# Patient Record
Sex: FEMALE | Race: WHITE | NOT HISPANIC OR LATINO | Employment: OTHER | URBAN - METROPOLITAN AREA
[De-identification: names, ages, dates, MRNs, and addresses within clinical notes are randomized per-mention and may not be internally consistent; named-entity substitution may affect disease eponyms.]

---

## 2018-02-02 ENCOUNTER — COMMUNICATION - HEALTHEAST (OUTPATIENT)
Dept: FAMILY MEDICINE | Facility: CLINIC | Age: 73
End: 2018-02-02

## 2018-02-09 ENCOUNTER — OFFICE VISIT - HEALTHEAST (OUTPATIENT)
Dept: FAMILY MEDICINE | Facility: CLINIC | Age: 73
End: 2018-02-09

## 2018-02-09 ENCOUNTER — AMBULATORY - HEALTHEAST (OUTPATIENT)
Dept: SCHEDULING | Facility: CLINIC | Age: 73
End: 2018-02-09

## 2018-02-09 DIAGNOSIS — Z13.220 ENCOUNTER FOR SCREENING FOR LIPOID DISORDERS: ICD-10-CM

## 2018-02-09 DIAGNOSIS — Z12.11 SCREEN FOR COLON CANCER: ICD-10-CM

## 2018-02-09 DIAGNOSIS — F33.40 RECURRENT MAJOR DEPRESSION IN REMISSION (H): ICD-10-CM

## 2018-02-09 DIAGNOSIS — E78.5 HYPERLIPIDEMIA, UNSPECIFIED HYPERLIPIDEMIA TYPE: ICD-10-CM

## 2018-02-09 DIAGNOSIS — Z00.00 HEALTH MAINTENANCE EXAMINATION: ICD-10-CM

## 2018-02-09 DIAGNOSIS — I10 ESSENTIAL HYPERTENSION: ICD-10-CM

## 2018-02-09 DIAGNOSIS — R73.01 IMPAIRED FASTING GLUCOSE: ICD-10-CM

## 2018-02-09 DIAGNOSIS — Z12.11 ENCOUNTER FOR SCREENING FOR MALIGNANT NEOPLASM OF COLON: ICD-10-CM

## 2018-02-09 DIAGNOSIS — G47.33 OBSTRUCTIVE SLEEP APNEA SYNDROME: ICD-10-CM

## 2018-02-09 LAB
25(OH)D3 SERPL-MCNC: 26.6 NG/ML (ref 30–80)
25(OH)D3 SERPL-MCNC: 26.6 NG/ML (ref 30–80)
ALBUMIN SERPL-MCNC: 4.1 G/DL (ref 3.5–5)
ALP SERPL-CCNC: 85 U/L (ref 45–120)
ALT SERPL W P-5'-P-CCNC: 55 U/L (ref 0–45)
ANION GAP SERPL CALCULATED.3IONS-SCNC: 13 MMOL/L (ref 5–18)
AST SERPL W P-5'-P-CCNC: 43 U/L (ref 0–40)
BILIRUB SERPL-MCNC: 0.7 MG/DL (ref 0–1)
BUN SERPL-MCNC: 14 MG/DL (ref 8–28)
CALCIUM SERPL-MCNC: 9.6 MG/DL (ref 8.5–10.5)
CHLORIDE BLD-SCNC: 101 MMOL/L (ref 98–107)
CHOLEST SERPL-MCNC: 190 MG/DL
CO2 SERPL-SCNC: 24 MMOL/L (ref 22–31)
CREAT SERPL-MCNC: 0.71 MG/DL (ref 0.6–1.1)
ERYTHROCYTE [DISTWIDTH] IN BLOOD BY AUTOMATED COUNT: 12.5 % (ref 11–14.5)
FASTING STATUS PATIENT QL REPORTED: YES
GFR SERPL CREATININE-BSD FRML MDRD: >60 ML/MIN/1.73M2
GLUCOSE BLD-MCNC: 109 MG/DL (ref 70–125)
HCT VFR BLD AUTO: 46 % (ref 35–47)
HDLC SERPL-MCNC: 58 MG/DL
HGB BLD-MCNC: 15.1 G/DL (ref 12–16)
LDLC SERPL CALC-MCNC: 98 MG/DL
MCH RBC QN AUTO: 29.4 PG (ref 27–34)
MCHC RBC AUTO-ENTMCNC: 32.7 G/DL (ref 32–36)
MCV RBC AUTO: 90 FL (ref 80–100)
PLATELET # BLD AUTO: 234 THOU/UL (ref 140–440)
PMV BLD AUTO: 7.2 FL (ref 7–10)
POTASSIUM BLD-SCNC: 4.3 MMOL/L (ref 3.5–5)
PROT SERPL-MCNC: 7.3 G/DL (ref 6–8)
RBC # BLD AUTO: 5.12 MILL/UL (ref 3.8–5.4)
SODIUM SERPL-SCNC: 138 MMOL/L (ref 136–145)
TRIGL SERPL-MCNC: 169 MG/DL
TSH SERPL DL<=0.005 MIU/L-ACNC: 1.88 UIU/ML (ref 0.3–5)
WBC: 5.2 THOU/UL (ref 4–11)

## 2018-02-09 RX ORDER — COVID-19 ANTIGEN TEST
220 KIT MISCELLANEOUS DAILY
Status: SHIPPED | COMMUNITY
Start: 2018-02-09 | End: 2021-12-09

## 2018-02-09 RX ORDER — ACETAMINOPHEN 325 MG/1
1300 TABLET ORAL DAILY
Status: SHIPPED | COMMUNITY
Start: 2018-02-09 | End: 2021-12-09

## 2018-02-09 ASSESSMENT — MIFFLIN-ST. JEOR: SCORE: 1417.43

## 2018-02-23 ENCOUNTER — COMMUNICATION - HEALTHEAST (OUTPATIENT)
Dept: FAMILY MEDICINE | Facility: CLINIC | Age: 73
End: 2018-02-23

## 2018-02-27 ENCOUNTER — HOSPITAL ENCOUNTER (OUTPATIENT)
Dept: MAMMOGRAPHY | Facility: HOSPITAL | Age: 73
Discharge: HOME | End: 2018-02-27
Attending: NURSE PRACTITIONER

## 2018-02-27 DIAGNOSIS — Z00.00 HEALTH MAINTENANCE EXAMINATION: ICD-10-CM

## 2018-04-23 ENCOUNTER — RECORDS - HEALTHEAST (OUTPATIENT)
Dept: ADMINISTRATIVE | Facility: OTHER | Age: 73
End: 2018-04-23

## 2018-10-19 ENCOUNTER — COMMUNICATION - HEALTHEAST (OUTPATIENT)
Dept: FAMILY MEDICINE | Facility: CLINIC | Age: 73
End: 2018-10-19

## 2018-11-21 ENCOUNTER — RECORDS - HEALTHEAST (OUTPATIENT)
Dept: ADMINISTRATIVE | Facility: OTHER | Age: 73
End: 2018-11-21

## 2018-11-27 ENCOUNTER — OFFICE VISIT - HEALTHEAST (OUTPATIENT)
Dept: FAMILY MEDICINE | Facility: CLINIC | Age: 73
End: 2018-11-27

## 2018-11-27 DIAGNOSIS — Z86.69 HISTORY OF EAR INFECTION: ICD-10-CM

## 2018-11-27 DIAGNOSIS — Z86.19 HISTORY OF SHINGLES: ICD-10-CM

## 2018-12-10 ENCOUNTER — OFFICE VISIT - HEALTHEAST (OUTPATIENT)
Dept: FAMILY MEDICINE | Facility: CLINIC | Age: 73
End: 2018-12-10

## 2018-12-10 DIAGNOSIS — R30.0 DYSURIA: ICD-10-CM

## 2018-12-10 LAB
ALBUMIN UR-MCNC: NEGATIVE MG/DL
AMORPH CRY #/AREA URNS HPF: ABNORMAL /[HPF]
APPEARANCE UR: CLEAR
BACTERIA #/AREA URNS HPF: ABNORMAL HPF
BILIRUB UR QL STRIP: NEGATIVE
COLOR UR AUTO: YELLOW
GLUCOSE UR STRIP-MCNC: NEGATIVE MG/DL
HGB UR QL STRIP: ABNORMAL
KETONES UR STRIP-MCNC: NEGATIVE MG/DL
LEUKOCYTE ESTERASE UR QL STRIP: ABNORMAL
NITRATE UR QL: NEGATIVE
PH UR STRIP: 7.5 [PH] (ref 5–8)
RBC #/AREA URNS AUTO: ABNORMAL HPF
SP GR UR STRIP: 1.02 (ref 1–1.03)
SQUAMOUS #/AREA URNS AUTO: ABNORMAL LPF
UROBILINOGEN UR STRIP-ACNC: ABNORMAL
WBC #/AREA URNS AUTO: ABNORMAL HPF

## 2018-12-13 LAB — BACTERIA SPEC CULT: ABNORMAL

## 2019-01-19 ENCOUNTER — COMMUNICATION - HEALTHEAST (OUTPATIENT)
Dept: FAMILY MEDICINE | Facility: CLINIC | Age: 74
End: 2019-01-19

## 2019-01-19 DIAGNOSIS — I10 ESSENTIAL HYPERTENSION: ICD-10-CM

## 2019-01-19 DIAGNOSIS — F33.40 RECURRENT MAJOR DEPRESSION IN REMISSION (H): ICD-10-CM

## 2019-03-29 ENCOUNTER — RECORDS - HEALTHEAST (OUTPATIENT)
Dept: GENERAL RADIOLOGY | Facility: CLINIC | Age: 74
End: 2019-03-29

## 2019-03-29 ENCOUNTER — OFFICE VISIT - HEALTHEAST (OUTPATIENT)
Dept: FAMILY MEDICINE | Facility: CLINIC | Age: 74
End: 2019-03-29

## 2019-03-29 ENCOUNTER — AMBULATORY - HEALTHEAST (OUTPATIENT)
Dept: SCHEDULING | Facility: CLINIC | Age: 74
End: 2019-03-29

## 2019-03-29 DIAGNOSIS — M25.561 PAIN IN RIGHT KNEE: ICD-10-CM

## 2019-03-29 DIAGNOSIS — Z00.01 ENCOUNTER FOR GENERAL ADULT MEDICAL EXAMINATION WITH ABNORMAL FINDINGS: ICD-10-CM

## 2019-03-29 DIAGNOSIS — Z78.0 POSTMENOPAUSAL: ICD-10-CM

## 2019-03-29 DIAGNOSIS — G47.33 OBSTRUCTIVE SLEEP APNEA SYNDROME: ICD-10-CM

## 2019-03-29 DIAGNOSIS — I10 ESSENTIAL HYPERTENSION: ICD-10-CM

## 2019-03-29 DIAGNOSIS — E78.5 HYPERLIPIDEMIA, UNSPECIFIED HYPERLIPIDEMIA TYPE: ICD-10-CM

## 2019-03-29 DIAGNOSIS — M79.672 PAIN IN BOTH FEET: ICD-10-CM

## 2019-03-29 DIAGNOSIS — F33.40 RECURRENT MAJOR DEPRESSION IN REMISSION (H): ICD-10-CM

## 2019-03-29 DIAGNOSIS — M79.671 PAIN IN BOTH FEET: ICD-10-CM

## 2019-03-29 DIAGNOSIS — Z78.0 POSTMENOPAUSAL STATUS: ICD-10-CM

## 2019-03-29 DIAGNOSIS — G89.29 CHRONIC PAIN OF RIGHT KNEE: ICD-10-CM

## 2019-03-29 DIAGNOSIS — G89.29 OTHER CHRONIC PAIN: ICD-10-CM

## 2019-03-29 DIAGNOSIS — M25.561 CHRONIC PAIN OF RIGHT KNEE: ICD-10-CM

## 2019-03-29 LAB
ALBUMIN SERPL-MCNC: 4.2 G/DL (ref 3.5–5)
ALP SERPL-CCNC: 78 U/L (ref 45–120)
ALT SERPL W P-5'-P-CCNC: 45 U/L (ref 0–45)
ANION GAP SERPL CALCULATED.3IONS-SCNC: 10 MMOL/L (ref 5–18)
AST SERPL W P-5'-P-CCNC: 35 U/L (ref 0–40)
BILIRUB SERPL-MCNC: 0.6 MG/DL (ref 0–1)
BUN SERPL-MCNC: 18 MG/DL (ref 8–28)
CALCIUM SERPL-MCNC: 9.9 MG/DL (ref 8.5–10.5)
CHLORIDE BLD-SCNC: 103 MMOL/L (ref 98–107)
CHOLEST SERPL-MCNC: 175 MG/DL
CO2 SERPL-SCNC: 25 MMOL/L (ref 22–31)
CREAT SERPL-MCNC: 0.73 MG/DL (ref 0.6–1.1)
ERYTHROCYTE [DISTWIDTH] IN BLOOD BY AUTOMATED COUNT: 12 % (ref 11–14.5)
FASTING STATUS PATIENT QL REPORTED: YES
GFR SERPL CREATININE-BSD FRML MDRD: >60 ML/MIN/1.73M2
GLUCOSE BLD-MCNC: 107 MG/DL (ref 70–125)
HCT VFR BLD AUTO: 43.1 % (ref 35–47)
HDLC SERPL-MCNC: 58 MG/DL
HGB BLD-MCNC: 14.3 G/DL (ref 12–16)
LDLC SERPL CALC-MCNC: 93 MG/DL
MCH RBC QN AUTO: 30.3 PG (ref 27–34)
MCHC RBC AUTO-ENTMCNC: 33.2 G/DL (ref 32–36)
MCV RBC AUTO: 91 FL (ref 80–100)
PLATELET # BLD AUTO: 245 THOU/UL (ref 140–440)
PMV BLD AUTO: 7.3 FL (ref 7–10)
POTASSIUM BLD-SCNC: 4.1 MMOL/L (ref 3.5–5)
PROT SERPL-MCNC: 6.9 G/DL (ref 6–8)
RBC # BLD AUTO: 4.72 MILL/UL (ref 3.8–5.4)
SODIUM SERPL-SCNC: 138 MMOL/L (ref 136–145)
TRIGL SERPL-MCNC: 121 MG/DL
VIT B12 SERPL-MCNC: 1616 PG/ML (ref 213–816)
WBC: 5.2 THOU/UL (ref 4–11)

## 2019-03-29 RX ORDER — CHOLECALCIFEROL (VITAMIN D3) 50 MCG
4000 TABLET ORAL DAILY
Status: SHIPPED | COMMUNITY
Start: 2019-03-29

## 2019-03-29 ASSESSMENT — MIFFLIN-ST. JEOR: SCORE: 1396.23

## 2019-04-01 LAB
25(OH)D3 SERPL-MCNC: 42.9 NG/ML (ref 30–80)
25(OH)D3 SERPL-MCNC: 42.9 NG/ML (ref 30–80)

## 2019-04-29 ENCOUNTER — OFFICE VISIT - HEALTHEAST (OUTPATIENT)
Dept: PODIATRY | Facility: CLINIC | Age: 74
End: 2019-04-29

## 2019-04-29 DIAGNOSIS — M20.12 VALGUS DEFORMITY OF BOTH GREAT TOES: ICD-10-CM

## 2019-04-29 DIAGNOSIS — M21.621 TAILOR'S BUNION OF BOTH FEET: ICD-10-CM

## 2019-04-29 DIAGNOSIS — M79.672 FOOT PAIN, BILATERAL: ICD-10-CM

## 2019-04-29 DIAGNOSIS — M20.11 VALGUS DEFORMITY OF BOTH GREAT TOES: ICD-10-CM

## 2019-04-29 DIAGNOSIS — M79.671 FOOT PAIN, BILATERAL: ICD-10-CM

## 2019-04-29 DIAGNOSIS — M21.622 TAILOR'S BUNION OF BOTH FEET: ICD-10-CM

## 2019-04-29 DIAGNOSIS — G62.9 PERIPHERAL POLYNEUROPATHY: ICD-10-CM

## 2019-04-29 ASSESSMENT — MIFFLIN-ST. JEOR: SCORE: 1392.6

## 2019-05-06 ENCOUNTER — COMMUNICATION - HEALTHEAST (OUTPATIENT)
Dept: OTHER | Facility: CLINIC | Age: 74
End: 2019-05-06

## 2019-05-13 ENCOUNTER — COMMUNICATION - HEALTHEAST (OUTPATIENT)
Dept: OTHER | Facility: CLINIC | Age: 74
End: 2019-05-13

## 2019-06-24 ENCOUNTER — RECORDS - HEALTHEAST (OUTPATIENT)
Dept: ADMINISTRATIVE | Facility: OTHER | Age: 74
End: 2019-06-24

## 2019-07-09 ENCOUNTER — OFFICE VISIT - HEALTHEAST (OUTPATIENT)
Dept: FAMILY MEDICINE | Facility: CLINIC | Age: 74
End: 2019-07-09

## 2019-07-09 ENCOUNTER — COMMUNICATION - HEALTHEAST (OUTPATIENT)
Dept: SCHEDULING | Facility: CLINIC | Age: 74
End: 2019-07-09

## 2019-07-09 DIAGNOSIS — R39.15 URINARY URGENCY: ICD-10-CM

## 2019-07-09 DIAGNOSIS — B37.31 YEAST INFECTION OF THE VAGINA: ICD-10-CM

## 2019-07-09 LAB
ALBUMIN UR-MCNC: NEGATIVE MG/DL
APPEARANCE UR: CLEAR
BILIRUB UR QL STRIP: NEGATIVE
COLOR UR AUTO: YELLOW
GLUCOSE UR STRIP-MCNC: NEGATIVE MG/DL
HGB UR QL STRIP: NEGATIVE
KETONES UR STRIP-MCNC: NEGATIVE MG/DL
LEUKOCYTE ESTERASE UR QL STRIP: ABNORMAL
NITRATE UR QL: NEGATIVE
PH UR STRIP: 7 [PH] (ref 5–8)
SP GR UR STRIP: 1.01 (ref 1–1.03)
SQUAMOUS #/AREA URNS AUTO: ABNORMAL LPF
UROBILINOGEN UR STRIP-ACNC: ABNORMAL
WBC #/AREA URNS AUTO: ABNORMAL HPF

## 2019-07-09 ASSESSMENT — MIFFLIN-ST. JEOR: SCORE: 1383.69

## 2019-07-10 LAB — BACTERIA SPEC CULT: NO GROWTH

## 2019-07-25 ENCOUNTER — COMMUNICATION - HEALTHEAST (OUTPATIENT)
Dept: FAMILY MEDICINE | Facility: CLINIC | Age: 74
End: 2019-07-25

## 2019-07-25 DIAGNOSIS — N89.8 VAGINAL DRYNESS: ICD-10-CM

## 2019-07-30 ENCOUNTER — COMMUNICATION - HEALTHEAST (OUTPATIENT)
Dept: FAMILY MEDICINE | Facility: CLINIC | Age: 74
End: 2019-07-30

## 2019-07-30 DIAGNOSIS — N89.8 VAGINAL DRYNESS: ICD-10-CM

## 2019-07-30 DIAGNOSIS — F33.40 RECURRENT MAJOR DEPRESSION IN REMISSION (H): ICD-10-CM

## 2019-10-16 ENCOUNTER — COMMUNICATION - HEALTHEAST (OUTPATIENT)
Dept: FAMILY MEDICINE | Facility: CLINIC | Age: 74
End: 2019-10-16

## 2019-10-16 DIAGNOSIS — E78.2 MIXED HYPERLIPIDEMIA: ICD-10-CM

## 2019-12-19 ENCOUNTER — OFFICE VISIT - HEALTHEAST (OUTPATIENT)
Dept: FAMILY MEDICINE | Facility: CLINIC | Age: 74
End: 2019-12-19

## 2019-12-19 DIAGNOSIS — G47.33 OBSTRUCTIVE SLEEP APNEA SYNDROME: ICD-10-CM

## 2019-12-19 DIAGNOSIS — E78.2 MIXED HYPERLIPIDEMIA: ICD-10-CM

## 2019-12-19 DIAGNOSIS — F33.40 RECURRENT MAJOR DEPRESSION IN REMISSION (H): ICD-10-CM

## 2019-12-19 DIAGNOSIS — I10 ESSENTIAL HYPERTENSION: ICD-10-CM

## 2019-12-19 ASSESSMENT — MIFFLIN-ST. JEOR: SCORE: 1379.15

## 2019-12-19 ASSESSMENT — PATIENT HEALTH QUESTIONNAIRE - PHQ9: SUM OF ALL RESPONSES TO PHQ QUESTIONS 1-9: 1

## 2020-01-06 ENCOUNTER — OFFICE VISIT - HEALTHEAST (OUTPATIENT)
Dept: FAMILY MEDICINE | Facility: CLINIC | Age: 75
End: 2020-01-06

## 2020-01-06 ENCOUNTER — RECORDS - HEALTHEAST (OUTPATIENT)
Dept: GENERAL RADIOLOGY | Facility: CLINIC | Age: 75
End: 2020-01-06

## 2020-01-06 DIAGNOSIS — J20.9 ACUTE BRONCHITIS, UNSPECIFIED ORGANISM: ICD-10-CM

## 2020-01-06 DIAGNOSIS — Z12.31 VISIT FOR SCREENING MAMMOGRAM: ICD-10-CM

## 2020-01-06 DIAGNOSIS — B37.31 YEAST VAGINITIS: ICD-10-CM

## 2020-01-06 DIAGNOSIS — R05.9 COUGH: ICD-10-CM

## 2020-01-14 ENCOUNTER — COMMUNICATION - HEALTHEAST (OUTPATIENT)
Dept: FAMILY MEDICINE | Facility: CLINIC | Age: 75
End: 2020-01-14

## 2020-01-14 DIAGNOSIS — F33.40 RECURRENT MAJOR DEPRESSION IN REMISSION (H): ICD-10-CM

## 2020-01-14 DIAGNOSIS — E78.2 MIXED HYPERLIPIDEMIA: ICD-10-CM

## 2020-01-14 DIAGNOSIS — I10 ESSENTIAL HYPERTENSION: ICD-10-CM

## 2020-03-24 ENCOUNTER — COMMUNICATION - HEALTHEAST (OUTPATIENT)
Dept: FAMILY MEDICINE | Facility: CLINIC | Age: 75
End: 2020-03-24

## 2020-03-24 DIAGNOSIS — I10 ESSENTIAL HYPERTENSION: ICD-10-CM

## 2020-03-26 ENCOUNTER — COMMUNICATION - HEALTHEAST (OUTPATIENT)
Dept: FAMILY MEDICINE | Facility: CLINIC | Age: 75
End: 2020-03-26

## 2020-04-02 ENCOUNTER — NURSE TRIAGE (OUTPATIENT)
Dept: NURSING | Facility: CLINIC | Age: 75
End: 2020-04-02

## 2020-04-02 NOTE — TELEPHONE ENCOUNTER
Patient called really to find out if she can take Ibuprofen again. In February she saw her ortho and was told she has bone on bone in right knee and had cortisone shot and was taking alternating  tylenol and ibuprofen.  She also had 6 PT sessions  before closing of clinic and continues those exercises. She has only been taking the tylenol due to the negative news about Ibuprofen. Now her knee is very sore.   Checked with lead RN and [facility] and WHO are not offering this advice. No contraindications against Ibuprofen at this time. Reviewed other advice for pain. Later found out she is taking Alleve rather than Ibuprofen, and she will restart this tonight. Also recommended she use mychart to contact her PCP for further validation. She states she has a [facility] PCP in [place] however I am unable to find this chart.     She did mention she also has dry cough for about a week, but then later says she had bronchitis in January and this has been there and is much better now. She also has nasal drip when she goes outside   and hinks this is all related to seasonal allergies. She has no temperature. Reviewed some OTC things like Mucinex also for this. Reminded to call back should cough get worse or should she develop a fever.     Rain Pollack RN on 4/2/2020 at 2:07 PM      Additional Information    Negative: Sounds like a life-threatening emergency to the triager    Negative: Swollen knee joint and fever    Negative: Thigh or calf pain and only 1 side and present > 1 hour    Negative: Thigh or calf swelling and only 1 side    Negative: Patient sounds very sick or weak to the triager    Negative: Can't move swollen joint at all    Negative: SEVERE pain (e.g., excruciating, unable to walk)    Negative: Very swollen joint    Negative: Painful rash with multiple small blisters grouped together (i.e., dermatomal distribution or 'band' or 'stripe')    Negative: Looks like a boil, infected sore, deep ulcer, or other infected rash (spreading  redness, pus)    Negative: Patient wants to be seen    Negative: MODERATE pain (e.g., symptoms interfere with work or school, limping) and present > 3 days    Negative: Swollen knee joint (no fever or redness)    Negative: Fluid-filled sack just below knee cap  (no fever or redness)    Negative: MILD knee pain persists > 7 days    Knee pain is a chronic symptom (recurrent or ongoing AND lasting > 4 weeks)    Protocols used: KNEE PAIN-A-OH

## 2020-07-09 ENCOUNTER — COMMUNICATION - HEALTHEAST (OUTPATIENT)
Dept: FAMILY MEDICINE | Facility: CLINIC | Age: 75
End: 2020-07-09

## 2020-07-09 DIAGNOSIS — I10 ESSENTIAL HYPERTENSION: ICD-10-CM

## 2020-07-10 ENCOUNTER — COMMUNICATION - HEALTHEAST (OUTPATIENT)
Dept: FAMILY MEDICINE | Facility: CLINIC | Age: 75
End: 2020-07-10

## 2020-07-10 DIAGNOSIS — I10 ESSENTIAL HYPERTENSION: ICD-10-CM

## 2020-07-21 ENCOUNTER — COMMUNICATION - HEALTHEAST (OUTPATIENT)
Dept: SCHEDULING | Facility: CLINIC | Age: 75
End: 2020-07-21

## 2020-07-21 ENCOUNTER — OFFICE VISIT - HEALTHEAST (OUTPATIENT)
Dept: FAMILY MEDICINE | Facility: CLINIC | Age: 75
End: 2020-07-21

## 2020-07-21 DIAGNOSIS — S60.041A CONTUSION OF RIGHT RING FINGER WITHOUT DAMAGE TO NAIL, INITIAL ENCOUNTER: ICD-10-CM

## 2020-08-03 ENCOUNTER — COMMUNICATION - HEALTHEAST (OUTPATIENT)
Dept: FAMILY MEDICINE | Facility: CLINIC | Age: 75
End: 2020-08-03

## 2020-08-03 DIAGNOSIS — E78.2 MIXED HYPERLIPIDEMIA: ICD-10-CM

## 2020-08-03 DIAGNOSIS — I10 ESSENTIAL HYPERTENSION: ICD-10-CM

## 2020-08-03 DIAGNOSIS — F33.40 RECURRENT MAJOR DEPRESSION IN REMISSION (H): ICD-10-CM

## 2020-08-18 ENCOUNTER — OFFICE VISIT - HEALTHEAST (OUTPATIENT)
Dept: FAMILY MEDICINE | Facility: CLINIC | Age: 75
End: 2020-08-18

## 2020-08-18 DIAGNOSIS — Z87.898 HISTORY OF CHRONIC COUGH: ICD-10-CM

## 2020-08-18 DIAGNOSIS — E78.2 MIXED HYPERLIPIDEMIA: ICD-10-CM

## 2020-08-18 DIAGNOSIS — R73.01 IMPAIRED FASTING GLUCOSE: ICD-10-CM

## 2020-08-18 DIAGNOSIS — G47.33 OBSTRUCTIVE SLEEP APNEA SYNDROME: ICD-10-CM

## 2020-08-18 DIAGNOSIS — I10 ESSENTIAL HYPERTENSION: ICD-10-CM

## 2020-08-18 DIAGNOSIS — F33.40 RECURRENT MAJOR DEPRESSION IN REMISSION (H): ICD-10-CM

## 2020-08-18 DIAGNOSIS — Z12.31 VISIT FOR SCREENING MAMMOGRAM: ICD-10-CM

## 2020-08-18 DIAGNOSIS — Z00.00 MEDICARE ANNUAL WELLNESS VISIT, SUBSEQUENT: ICD-10-CM

## 2020-08-18 LAB
ALBUMIN SERPL-MCNC: 4.3 G/DL (ref 3.5–5)
ALP SERPL-CCNC: 95 U/L (ref 45–120)
ALT SERPL W P-5'-P-CCNC: 39 U/L (ref 0–45)
ANION GAP SERPL CALCULATED.3IONS-SCNC: 12 MMOL/L (ref 5–18)
AST SERPL W P-5'-P-CCNC: 31 U/L (ref 0–40)
BILIRUB SERPL-MCNC: 0.6 MG/DL (ref 0–1)
BUN SERPL-MCNC: 17 MG/DL (ref 8–28)
CALCIUM SERPL-MCNC: 9.5 MG/DL (ref 8.5–10.5)
CHLORIDE BLD-SCNC: 100 MMOL/L (ref 98–107)
CHOLEST SERPL-MCNC: 200 MG/DL
CO2 SERPL-SCNC: 23 MMOL/L (ref 22–31)
CREAT SERPL-MCNC: 0.7 MG/DL (ref 0.6–1.1)
ERYTHROCYTE [DISTWIDTH] IN BLOOD BY AUTOMATED COUNT: 12 % (ref 11–14.5)
FASTING STATUS PATIENT QL REPORTED: ABNORMAL
GFR SERPL CREATININE-BSD FRML MDRD: >60 ML/MIN/1.73M2
GLUCOSE BLD-MCNC: 99 MG/DL (ref 70–125)
HBA1C MFR BLD: 5.9 %
HCT VFR BLD AUTO: 46.1 % (ref 35–47)
HDLC SERPL-MCNC: 53 MG/DL
HGB BLD-MCNC: 15.3 G/DL (ref 12–16)
LDLC SERPL CALC-MCNC: 106 MG/DL
MCH RBC QN AUTO: 30.6 PG (ref 27–34)
MCHC RBC AUTO-ENTMCNC: 33.1 G/DL (ref 32–36)
MCV RBC AUTO: 92 FL (ref 80–100)
PLATELET # BLD AUTO: 226 THOU/UL (ref 140–440)
PMV BLD AUTO: 7.3 FL (ref 7–10)
POTASSIUM BLD-SCNC: 4.2 MMOL/L (ref 3.5–5)
PROT SERPL-MCNC: 7.2 G/DL (ref 6–8)
RBC # BLD AUTO: 4.99 MILL/UL (ref 3.8–5.4)
SODIUM SERPL-SCNC: 135 MMOL/L (ref 136–145)
TRIGL SERPL-MCNC: 205 MG/DL
WBC: 5.6 THOU/UL (ref 4–11)

## 2020-08-18 ASSESSMENT — PATIENT HEALTH QUESTIONNAIRE - PHQ9: SUM OF ALL RESPONSES TO PHQ QUESTIONS 1-9: 3

## 2020-08-18 ASSESSMENT — MIFFLIN-ST. JEOR: SCORE: 1357.04

## 2020-08-21 ENCOUNTER — COMMUNICATION - HEALTHEAST (OUTPATIENT)
Dept: FAMILY MEDICINE | Facility: CLINIC | Age: 75
End: 2020-08-21

## 2020-08-21 ENCOUNTER — COMMUNICATION - HEALTHEAST (OUTPATIENT)
Dept: SCHEDULING | Facility: CLINIC | Age: 75
End: 2020-08-21

## 2020-09-14 ENCOUNTER — OFFICE VISIT - HEALTHEAST (OUTPATIENT)
Dept: FAMILY MEDICINE | Facility: CLINIC | Age: 75
End: 2020-09-14

## 2020-09-14 DIAGNOSIS — L30.4 INTERTRIGO: ICD-10-CM

## 2020-09-14 DIAGNOSIS — M54.9 BACK PAIN: ICD-10-CM

## 2020-09-14 LAB
ALBUMIN UR-MCNC: NEGATIVE MG/DL
APPEARANCE UR: CLEAR
BILIRUB UR QL STRIP: NEGATIVE
COLOR UR AUTO: YELLOW
GLUCOSE UR STRIP-MCNC: NEGATIVE MG/DL
HGB UR QL STRIP: NEGATIVE
KETONES UR STRIP-MCNC: NEGATIVE MG/DL
LEUKOCYTE ESTERASE UR QL STRIP: NEGATIVE
NITRATE UR QL: NEGATIVE
PH UR STRIP: 6.5 [PH] (ref 5–8)
SP GR UR STRIP: 1.02 (ref 1–1.03)
UROBILINOGEN UR STRIP-ACNC: NORMAL

## 2020-09-24 ENCOUNTER — HOSPITAL ENCOUNTER (OUTPATIENT)
Dept: MAMMOGRAPHY | Facility: CLINIC | Age: 75
Discharge: HOME | End: 2020-09-24
Attending: FAMILY MEDICINE

## 2020-09-24 DIAGNOSIS — Z12.31 VISIT FOR SCREENING MAMMOGRAM: ICD-10-CM

## 2020-09-25 ENCOUNTER — RECORDS - HEALTHEAST (OUTPATIENT)
Dept: ADMINISTRATIVE | Facility: OTHER | Age: 75
End: 2020-09-25

## 2020-10-26 ENCOUNTER — COMMUNICATION - HEALTHEAST (OUTPATIENT)
Dept: FAMILY MEDICINE | Facility: CLINIC | Age: 75
End: 2020-10-26

## 2020-10-26 DIAGNOSIS — E78.2 MIXED HYPERLIPIDEMIA: ICD-10-CM

## 2020-10-26 DIAGNOSIS — I10 ESSENTIAL HYPERTENSION: ICD-10-CM

## 2020-10-26 DIAGNOSIS — F33.40 RECURRENT MAJOR DEPRESSION IN REMISSION (H): ICD-10-CM

## 2020-10-28 RX ORDER — SIMVASTATIN 10 MG
TABLET ORAL
Qty: 90 TABLET | Refills: 2 | Status: SHIPPED | OUTPATIENT
Start: 2020-10-28 | End: 2022-05-10

## 2020-10-28 RX ORDER — LISINOPRIL 10 MG/1
TABLET ORAL
Qty: 90 TABLET | Refills: 2 | Status: SHIPPED | OUTPATIENT
Start: 2020-10-28 | End: 2021-09-24

## 2020-10-28 RX ORDER — TRIAMTERENE AND HYDROCHLOROTHIAZIDE 75; 50 MG/1; MG/1
TABLET ORAL
Qty: 90 TABLET | Refills: 2 | Status: SHIPPED | OUTPATIENT
Start: 2020-10-28 | End: 2021-09-24

## 2020-10-28 RX ORDER — NORTRIPTYLINE HCL 10 MG
CAPSULE ORAL
Qty: 90 CAPSULE | Refills: 2 | Status: SHIPPED | OUTPATIENT
Start: 2020-10-28 | End: 2021-07-28

## 2020-12-07 ENCOUNTER — RECORDS - HEALTHEAST (OUTPATIENT)
Dept: ADMINISTRATIVE | Facility: OTHER | Age: 75
End: 2020-12-07

## 2021-03-01 ENCOUNTER — IMMUNIZATION (OUTPATIENT)
Dept: NURSING | Facility: CLINIC | Age: 76
End: 2021-03-01
Payer: COMMERCIAL

## 2021-03-01 PROCEDURE — 91301 PR COVID VAC MODERNA 100 MCG/0.5 ML IM: CPT

## 2021-03-01 PROCEDURE — 0011A PR COVID VAC MODERNA 100 MCG/0.5 ML IM: CPT

## 2021-03-24 ENCOUNTER — DOCUMENTATION ONLY (OUTPATIENT)
Dept: OTHER | Facility: CLINIC | Age: 76
End: 2021-03-24

## 2021-03-24 ENCOUNTER — AMBULATORY - HEALTHEAST (OUTPATIENT)
Dept: OTHER | Facility: CLINIC | Age: 76
End: 2021-03-24

## 2021-03-29 ENCOUNTER — IMMUNIZATION (OUTPATIENT)
Dept: NURSING | Facility: CLINIC | Age: 76
End: 2021-03-29
Attending: INTERNAL MEDICINE
Payer: COMMERCIAL

## 2021-03-29 PROCEDURE — 91301 PR COVID VAC MODERNA 100 MCG/0.5 ML IM: CPT

## 2021-03-29 PROCEDURE — 0012A PR COVID VAC MODERNA 100 MCG/0.5 ML IM: CPT

## 2021-04-06 ENCOUNTER — OFFICE VISIT - HEALTHEAST (OUTPATIENT)
Dept: FAMILY MEDICINE | Facility: CLINIC | Age: 76
End: 2021-04-06

## 2021-04-06 DIAGNOSIS — E78.2 MIXED HYPERLIPIDEMIA: ICD-10-CM

## 2021-04-06 DIAGNOSIS — H26.9 CATARACT OF BOTH EYES, UNSPECIFIED CATARACT TYPE: ICD-10-CM

## 2021-04-06 DIAGNOSIS — Z01.818 PREOP GENERAL PHYSICAL EXAM: ICD-10-CM

## 2021-04-06 DIAGNOSIS — I10 ESSENTIAL HYPERTENSION: ICD-10-CM

## 2021-04-06 DIAGNOSIS — G47.33 OBSTRUCTIVE SLEEP APNEA SYNDROME: ICD-10-CM

## 2021-04-06 DIAGNOSIS — R73.01 IMPAIRED FASTING GLUCOSE: ICD-10-CM

## 2021-04-06 DIAGNOSIS — F33.40 RECURRENT MAJOR DEPRESSION IN REMISSION (H): ICD-10-CM

## 2021-04-06 ASSESSMENT — ANXIETY QUESTIONNAIRES
2. NOT BEING ABLE TO STOP OR CONTROL WORRYING: NOT AT ALL
4. TROUBLE RELAXING: SEVERAL DAYS
6. BECOMING EASILY ANNOYED OR IRRITABLE: NOT AT ALL
GAD7 TOTAL SCORE: 2
5. BEING SO RESTLESS THAT IT IS HARD TO SIT STILL: NOT AT ALL
1. FEELING NERVOUS, ANXIOUS, OR ON EDGE: SEVERAL DAYS
7. FEELING AFRAID AS IF SOMETHING AWFUL MIGHT HAPPEN: NOT AT ALL
3. WORRYING TOO MUCH ABOUT DIFFERENT THINGS: NOT AT ALL

## 2021-04-06 ASSESSMENT — MIFFLIN-ST. JEOR: SCORE: 1383.35

## 2021-04-06 ASSESSMENT — PATIENT HEALTH QUESTIONNAIRE - PHQ9: SUM OF ALL RESPONSES TO PHQ QUESTIONS 1-9: 2

## 2021-05-26 ASSESSMENT — PATIENT HEALTH QUESTIONNAIRE - PHQ9: SUM OF ALL RESPONSES TO PHQ QUESTIONS 1-9: 1

## 2021-05-27 ASSESSMENT — PATIENT HEALTH QUESTIONNAIRE - PHQ9
SUM OF ALL RESPONSES TO PHQ QUESTIONS 1-9: 2
SUM OF ALL RESPONSES TO PHQ QUESTIONS 1-9: 3

## 2021-05-27 NOTE — PROGRESS NOTES
Assessment and Plan:     1. Encounter for general adult medical examination with abnormal findings  Check fasting lab work today and follow for results.  Patient due to update DEXA scan and mammogram this year.  Orders placed today.  - Comprehensive Metabolic Panel  - HM2(CBC w/o Differential)  - Lipid Cascade    2. Recurrent major depression in remission (H)  Recheck vitamin levels today as these have been low in the past.  Continue medications as ordered as these are working well for the patient.  - Vitamin B12  - Vitamin D, Total (25-Hydroxy)    3. Chronic pain of right knee  Ongoing chronic pain of the right knee, knee exam fairly benign except some joint line tenderness.  Check an x-ray today and follow for results.  - XR Knee Right 1 or 2 VWS; Future    4. Pain in both feet  Pain in both feet, patient has bunions bilaterally as well as some hammertoe deformity starting.  She seems to have plantar fasciitis especially on the left foot.  We will refer to podiatry for management.  - Ambulatory referral to Podiatry    5. Postmenopausal status  Check vitamin D, update DEXA scan    6. Postmenopausal  - DXA Bone Density Scan; Future    7. Hyperlipidemia, unspecified hyperlipidemia type  Update lipids    8. Obstructive sleep apnea syndrome    9. Essential hypertension  Continue on medications for now.  Blood pressure in range.        The patient's current medical problems were reviewed.    The following high BMI interventions were performed this visit: encouragement to exercise, weight monitoring and dietary management education, guidance, and counseling  The following health maintenance schedule was reviewed with the patient and provided in printed form in the after visit summary:   Health Maintenance   Topic Date Due     ZOSTER VACCINES (2 of 3) 01/02/2015     INFLUENZA VACCINE RULE BASED (1) 08/01/2018     DEPRESSION FOLLOW UP  08/09/2018     DXA SCAN  10/06/2018     FALL RISK ASSESSMENT  02/09/2019     MAMMOGRAM   02/27/2020     ADVANCE DIRECTIVES DISCUSSED WITH PATIENT  02/09/2023     TD 18+ HE  02/09/2028     COLONOSCOPY  04/23/2028     PNEUMOCOCCAL POLYSACCHARIDE VACCINE AGE 65 AND OVER  Completed     PNEUMOCOCCAL CONJUGATE VACCINE FOR ADULTS (PCV13 OR PREVNAR)  Completed        Subjective:   Chief Complaint: Candelaria Hewitt is an 73 y.o. female here for an Annual Wellness visit.   HPI: Patient is feeling well with exception of some pain in her feet and pain in her right knee for about 9 months.  She has ongoing issues with bunions and what she believes to plantar fasciitis in her feet.  She wears inserts in tennis shoes most of the time but still has foot pain on and off.  She has started to notice some changes in her toes and wonders what next steps are for her evaluation of her feet.  Bottoms of the feet hurt mostly on the left into the heel she also gets some numbness in the balls of her feet at times.  Depending what shoes she wears the bunions will be more or less aggravated.  When she wears shoes that do not bother them they are not too painful.  She does do calf stretches and uses a tennis ball to massage the plantar fascia on a regular basis.  The right knee has been hurting for about 9 months.  She tripped over her grandson strike a while ago and it started hurting after that.  It is painful to kneel on.  It will get stiff when she is sitting for long periods of time.  She feels like she has to walk on it a certain way to be comfortable.  It is not unstable.  Patient has recently started doing Pilates, feels this is been helpful for her core strength.  She does occasionally have some bladder leakage is not ready to start on medications at this time for that.  We discussed that Pilates would potentially be helpful and she could look into pelvic floor therapy if she would like.    Review of Systems:    Please see above.  The rest of the review of systems are negative for all systems.    Patient Care Team:  Keerthi  KIMBERLY Galdamez as PCP - General (Nurse Practitioner)  Papito Valencia OD as Physician (Optometry)     Patient Active Problem List   Diagnosis     Essential hypertension     Family history of malignant neoplasm of gastrointestinal tract     Impaired fasting glucose     Recurrent major depression in remission (H)     Adiposity     Obstructive sleep apnea syndrome     Hyperlipidemia     Past Medical History:   Diagnosis Date     Depression      Hyperlipidemia      Hypertension       Past Surgical History:   Procedure Laterality Date     ECTOPIC PREGNANCY SURGERY       HYSTERECTOMY        Family History   Problem Relation Age of Onset     Hypertension Mother      Kidney disease Mother      Colon cancer Father      Alcohol abuse Father      Diabetes Father       Social History     Socioeconomic History     Marital status:      Spouse name: Not on file     Number of children: Not on file     Years of education: Not on file     Highest education level: Not on file   Occupational History     Not on file   Social Needs     Financial resource strain: Not on file     Food insecurity:     Worry: Not on file     Inability: Not on file     Transportation needs:     Medical: Not on file     Non-medical: Not on file   Tobacco Use     Smoking status: Former Smoker     Types: Cigarettes     Smokeless tobacco: Former User   Substance and Sexual Activity     Alcohol use: Yes     Alcohol/week: 0.6 oz     Types: 1 Glasses of wine per week     Drug use: No     Sexual activity: Not Currently     Partners: Male   Lifestyle     Physical activity:     Days per week: Not on file     Minutes per session: Not on file     Stress: Not on file   Relationships     Social connections:     Talks on phone: Not on file     Gets together: Not on file     Attends Yazdanism service: Not on file     Active member of club or organization: Not on file     Attends meetings of clubs or organizations: Not on file     Relationship status: Not on file     Intimate  partner violence:     Fear of current or ex partner: Not on file     Emotionally abused: Not on file     Physically abused: Not on file     Forced sexual activity: Not on file   Other Topics Concern     Not on file   Social History Narrative     Not on file      Current Outpatient Medications   Medication Sig Dispense Refill     acetaminophen (TYLENOL) 500 MG tablet 2 tabs in the am and 2 tabs at night as needed       calcium carbonate (OS-HERNANDO) 600 mg calcium (1,500 mg) tablet 2 TABLETS DAILY TWICE DAILY       cholecalciferol, vitamin D3, (VITAMIN D3 ORAL) Take 4,000 Units by mouth daily.       conjugated estrogens (PREMARIN) vaginal cream Insert into the vagina as needed.              cyanocobalamin, vitamin B-12, (VITAMIN B-12 ORAL) Take 1,000 mg by mouth daily.       FLUoxetine (PROZAC) 20 MG capsule Take 1 capsule (20 mg total) by mouth daily. 90 capsule 3     glucosamine-chondroitin 500-400 mg cap Take 1 capsule by mouth 2 (two) times a day.              lisinopril (PRINIVIL,ZESTRIL) 10 MG tablet Take 1 tablet (10 mg total) by mouth daily. 90 tablet 3     multivitamin therapeutic w/minerals (THERAPEUTIC-M) 27-0.4 mg Tab tablet        MULTIVITAMIN/IRON/FOLIC ACID (COMPLETE WOMEN ORAL) Take 1 tablet by mouth daily.              naproxen sodium 220 mg cap Take by mouth as needed.              nortriptyline (PAMELOR) 10 MG capsule Take 1 capsule (10 mg total) by mouth at bedtime. 90 capsule 3     omeprazole (PRILOSEC) 40 MG capsule Take 40 mg by mouth.       simvastatin (ZOCOR) 10 MG tablet Take 1 tablet (10 mg total) by mouth at bedtime. 90 tablet 3     triamcinolone (KENALOG) 0.1 % cream Use twice daily to affected area for up to 14 days. 15 g 0     triamterene-hydrochlorothiazide (MAXZIDE) 75-50 mg per tablet Take 1 tablet by mouth daily. 90 tablet 3     No current facility-administered medications for this visit.       Objective:   Vital Signs:   Visit Vitals  /59 (Patient Site: Left Arm, Patient  "Position: Sitting, Cuff Size: Adult Large)   Pulse 76   Temp 98.7  F (37.1  C) (Oral)   Resp 16   Ht 5' 3.74\" (1.619 m)   Wt 201 lb 12.8 oz (91.5 kg)   BMI 34.92 kg/m         VisionScreening:  No exam data present     PHYSICAL EXAM   /59 (Patient Site: Left Arm, Patient Position: Sitting, Cuff Size: Adult Large)   Pulse 76   Temp 98.7  F (37.1  C) (Oral)   Resp 16   Ht 5' 3.74\" (1.619 m)   Wt 201 lb 12.8 oz (91.5 kg)   BMI 34.92 kg/m    General appearance: alert, appears stated age and cooperative  Head: Normocephalic, without obvious abnormality, atraumatic  Eyes: conjunctivae/corneas clear. PERRL, EOM's intact. Fundi benign.  Ears: normal TM's and external ear canals both ears  Nose: Nares normal. Septum midline. Mucosa normal. No drainage or sinus tenderness.  Throat: lips, mucosa, and tongue normal; teeth and gums normal  Neck: no adenopathy, no carotid bruit, no JVD, supple, symmetrical, trachea midline and thyroid not enlarged, symmetric, no tenderness/mass/nodules  Back: symmetric, no curvature. ROM normal. No CVA tenderness.  Lungs: clear to auscultation bilaterally  Heart: regular rate and rhythm, S1, S2 normal, no murmur, click, rub or gallop  Abdomen: soft, non-tender; bowel sounds normal; no masses,  no organomegaly  Extremities: extremities normal, atraumatic, no cyanosis or edema. Right knee joint line pain, swelling on right lateral aspect inferior to patella  Pulses: 2+ and symmetric  Skin: Skin color, texture, turgor normal. No rashes or lesions  Lymph nodes: Cervical, supraclavicular, and axillary nodes normal.  Neurologic: Grossly normal      Assessment Results 3/29/2019   Activities of Daily Living No help needed   Instrumental Activities of Daily Living No help needed   Mini Cog Total Score 5   Some recent data might be hidden     A Mini-Cog score of 0-2 suggests the possibility of dementia, score of 3-5 suggests no dementia    Identified Health Risks:     She is at risk for lack of " exercise and has been provided with information to increase physical activity for the benefit of her well-being.  Information on urinary incontinence and treatment options given to patient.  She is at risk for falling and has been provided with information to reduce the risk of falling at home.  Patient's advanced directive was discussed and I am comfortable with the patient's wishes.

## 2021-05-28 ASSESSMENT — ANXIETY QUESTIONNAIRES: GAD7 TOTAL SCORE: 2

## 2021-05-28 NOTE — PROGRESS NOTES
FOOT AND ANKLE SURGERY/PODIATRY CONSULT NOTE         ASSESSMENT:   MASON Perera's bunion  Peripheral Neuropathy      TREATMENT:  -The patient complains of numbness on both feet. On exam, there does appear to be a decrease in sensation along the plantar lateral left foot. The patient does have lower back pain with referred pain into the left buttock. I have referred her to Dr. Li for peripheral neuropathy consultation.    -She also complains of generalized foot pain after long periods of standing and walking. Discussed that more supportive arch support may help to reduce symptoms. Referred to [facility] for custom orthotics.     -All questions invited and answered. She will follow-up with Dr. Li at her convenience.     Ryan Grant, [facility]/Podiatry      HPI: I was asked to see Candelaria Hewitt today for numbness in both feet and generalized discomfort after long periods of standing and walking. The patient states she has used over the counter inserts in the past with minimal relief. She also has bunions on both feet which cause rubbing in her shoes.    Past Medical History:   Diagnosis Date     Depression      Hyperlipidemia      Hypertension        Past Surgical History:   Procedure Laterality Date     ECTOPIC PREGNANCY SURGERY       HYSTERECTOMY         Allergies   Allergen Reactions     Haemophilus Influenzae Other (See Comments)     Morphine Nausea Only     Tolerate vicodin and codeine     Methocarbamol Rash         Current Outpatient Medications:      acetaminophen (TYLENOL) 500 MG tablet, 2 tabs in the am and 2 tabs at night as needed, Disp: , Rfl:      calcium carbonate (OS-HERNANDO) 600 mg calcium (1,500 mg) tablet, 2 TABLETS DAILY TWICE DAILY, Disp: , Rfl:      cholecalciferol, vitamin D3, (VITAMIN D3 ORAL), Take 4,000 Units by mouth daily., Disp: , Rfl:      conjugated estrogens (PREMARIN) vaginal cream, Insert into the vagina as needed.    , Disp: , Rfl:       cyanocobalamin, vitamin B-12, (VITAMIN B-12 ORAL), Take 1,000 mg by mouth daily., Disp: , Rfl:      FLUoxetine (PROZAC) 20 MG capsule, Take 1 capsule (20 mg total) by mouth daily., Disp: 90 capsule, Rfl: 3     glucosamine-chondroitin 500-400 mg cap, Take 1 capsule by mouth 2 (two) times a day.    , Disp: , Rfl:      lisinopril (PRINIVIL,ZESTRIL) 10 MG tablet, Take 1 tablet (10 mg total) by mouth daily., Disp: 90 tablet, Rfl: 3     multivitamin therapeutic w/minerals (THERAPEUTIC-M) 27-0.4 mg Tab tablet, , Disp: , Rfl:      MULTIVITAMIN/IRON/FOLIC ACID (COMPLETE WOMEN ORAL), Take 1 tablet by mouth daily.    , Disp: , Rfl:      naproxen sodium 220 mg cap, Take by mouth as needed.    , Disp: , Rfl:      nortriptyline (PAMELOR) 10 MG capsule, Take 1 capsule (10 mg total) by mouth at bedtime., Disp: 90 capsule, Rfl: 3     omeprazole (PRILOSEC) 40 MG capsule, Take 40 mg by mouth., Disp: , Rfl:      simvastatin (ZOCOR) 10 MG tablet, Take 1 tablet (10 mg total) by mouth at bedtime., Disp: 90 tablet, Rfl: 3     triamcinolone (KENALOG) 0.1 % cream, Use twice daily to affected area for up to 14 days., Disp: 15 g, Rfl: 0     triamterene-hydrochlorothiazide (MAXZIDE) 75-50 mg per tablet, Take 1 tablet by mouth daily., Disp: 90 tablet, Rfl: 3    Family History   Problem Relation Age of Onset     Hypertension Mother      Kidney disease Mother      Colon cancer Father      Alcohol abuse Father      Diabetes Father        Social History     Socioeconomic History     Marital status:      Spouse name: Not on file     Number of children: Not on file     Years of education: Not on file     Highest education level: Not on file   Occupational History     Not on file   Social Needs     Financial resource strain: Not on file     Food insecurity:     Worry: Not on file     Inability: Not on file     Transportation needs:     Medical: Not on file     Non-medical: Not on file   Tobacco Use     Smoking status: Former Smoker     Types:  Cigarettes     Smokeless tobacco: Former User   Substance and Sexual Activity     Alcohol use: Yes     Alcohol/week: 0.6 oz     Types: 1 Glasses of wine per week     Drug use: No     Sexual activity: Not Currently     Partners: Male   Lifestyle     Physical activity:     Days per week: Not on file     Minutes per session: Not on file     Stress: Not on file   Relationships     Social connections:     Talks on phone: Not on file     Gets together: Not on file     Attends Advent service: Not on file     Active member of club or organization: Not on file     Attends meetings of clubs or organizations: Not on file     Relationship status: Not on file     Intimate partner violence:     Fear of current or ex partner: Not on file     Emotionally abused: Not on file     Physically abused: Not on file     Forced sexual activity: Not on file   Other Topics Concern     Not on file   Social History Narrative     Not on file       Review of Systems - Patient denies fever, chills, rash, wound, stiffness, limping, numbness, weakness, heart burn, blood in stool, chest pain with activity, calf pain when walking, shortness of breath with activity, chronic cough, easy bleeding/bruising, swelling of ankles, excessive thirst, fatigue, depression, anxiety.        OBJECTIVE:  Appearance: alert, well appearing, and in no distress.    Vitals:    04/29/19 1025   BP: 126/74   Temp: 98.1  F (36.7  C)       BMI= Body mass index is 34.78 kg/m .    General appearance: Patient is alert and fully cooperative with history & exam.  No sign of distress is noted during the visit.     Psychiatric: Affect is pleasant & appropriate.  Patient appears motivated to improve health.     Respiratory: Breathing is regular & unlabored while sitting.     HEENT: Hearing is intact to spoken word.  Speech is clear.  No gross evidence of visual impairment that would impact ambulation.      Vascular: Dorsalis pedis and posterior tibial pulses are palpable. There is  pedal hair growth bilateral.  CFT < 3 sec from anterior tibial surface to distal digits bilateral. There is no appreciable edema noted.  Dermatologic: Turgor and texture are within normal limits. No coloration or temperature changes. No primary or secondary lesions noted.  Neurologic: Diminished to light touch plantar lateral left foot.   Musculoskeletal: Mild bunion and tailor's bunion bilateral feet. Left ankle malleoli appear shorter than right ankle.     Imaging:     Xr Foot Left 3 Or More Vws Standing    Result Date: 4/29/2019  EXAM DATE:         04/29/2019 EXAM: X-RAY FEET BILATERAL, MINIMUM 3 VIEWS LOCATION: [facility] DATE/TIME: 4/29/2019 10:15 AM INDICATION: Pain in right foot Pain in left foot COMPARISON: None. FINDINGS: Right foot: No fracture. Mild degenerative changes at the first MTP joint. Small plantar and Achilles heel spurs. Left foot: Mild hallux valgus with mild degenerative changes at the first MTP joint.

## 2021-05-28 NOTE — PATIENT INSTRUCTIONS - HE
Please call one of the [facility] locations below to schedule an appointment. If you received a prescription please bring it with you to your appointment. Some locations are limited to what they carry.    Office Locations    [facility]  [address] [place] [address]  Home Medical Equipment, [address]   Phone: [phone]   Orthotics and Prosthetics, [address]   Phone: [phone]    [facility]  Home Medical Equipment  [address], [address], [place] [address]   Phone: [phone]    Orthotics and Prosthetics ([facility])    [address], [address], [place] [address]  Phone: [phone]    [facility]  [address]  [address]   [place] [address]   Phone: [phone]    [facility]  [address]  [place] [address]  Phone: [phone]    [facility]   [address]  [place] [address]  Phone: [phone]    [facility]  [address]  [place] [address]  Phone: [phone]    [facility]  [address]  [address]  [place] [address]  Phone: [phone]    [place]   [address] [place] [address]   Phone: [phone]

## 2021-05-30 NOTE — PROGRESS NOTES
Assessment/ Plan     1. Yeast infection of the vagina  I think patient is mainly having some irritation of the external vulvar region and likely some fungal component.  As she did get some relief from the Diflucan, we will have her repeat that with an extra dose in a week if symptoms remain.  We will have her do some topical Monistat.  I do think this is likely cause from her chronic urinary leakage and having to wear a pad constantly.  We discussed measures to try to keep the area dry.  She did want a refill of her Premarin cream as when she was using it in the past she felt like she had less irritation in the vaginal area.  Would recommend follow-up with her primary care physician if symptoms are not improving over the next 2 weeks.  - conjugated estrogens (PREMARIN) vaginal cream; Insert into the vagina as needed.  Dispense: 30 g; Refill: 0  - fluconazole (DIFLUCAN) 150 MG tablet; Take 1 tablet (150 mg total) by mouth once for 1 dose.  Dispense: 2 tablet; Refill: 0    2. Urinary urgency  Urinalysis is unremarkable.  - Urinalysis-UC if Indicated      Subjective:       Candelaria Hewitt is a 73 y.o. female who presents for follow-up of possible yeast infection.  Patient is new to the [facility] today.  She states that the end of June she was having some fairly intense vaginal itching so seen in urgent care.  They treated her with 1 dose of Diflucan for presumed yeast vaginitis.  She states that it was helping but symptoms did not completely go away.  She had a negative urinalysis at this point.  I was able to track down those records and it was the emergency room.  She admits that she has chronic urinary leakage and wears a pad continually.  She states then yesterday she became very itchy again and decided to come in and be seen.  She has not noticed any discharge or odor.  She has not been really using anything topical.  She been tried not things dry out, especially at night by not wearing any underwear.  We  discussed that moisture is likely what is causing the issue.  She has not had any change in fabric softeners or soaps.  No change in toilet paper.  Her  passed away in 2017 so she said no new sexual partners.  She used to use Premarin cream and that seemed to help with some irritation, but she thinks it has .  She has had a little bit of urinary urgency.  No dysuria or frequency.  No flank pain or fever.  No nausea or vomiting.  Patient has had a hysterectomy in the past.    Relevant past medical, family, surgical, and social history reviewed with patient, unless noted in HPI, not pertinent for this visit.  Medications were discussed and reconciled.   Review of Systems   A 12 point comprehensive review of systems was negative except as noted.      Current Outpatient Medications   Medication Sig Dispense Refill     acetaminophen (TYLENOL) 500 MG tablet 2 tabs in the am and 2 tabs at night as needed       calcium carbonate (OS-HERNANDO) 600 mg calcium (1,500 mg) tablet 2 TABLETS DAILY TWICE DAILY       cholecalciferol, vitamin D3, (VITAMIN D3 ORAL) Take 4,000 Units by mouth daily.       conjugated estrogens (PREMARIN) vaginal cream Insert into the vagina as needed. 30 g 0     FLUoxetine (PROZAC) 20 MG capsule Take 1 capsule (20 mg total) by mouth daily. 90 capsule 3     glucosamine-chondroitin 500-400 mg cap Take 1 capsule by mouth 2 (two) times a day.              lisinopril (PRINIVIL,ZESTRIL) 10 MG tablet Take 1 tablet (10 mg total) by mouth daily. 90 tablet 3     MULTIVITAMIN/IRON/FOLIC ACID (COMPLETE WOMEN ORAL) Take 1 tablet by mouth daily.              naproxen sodium 220 mg cap Take by mouth as needed.              nortriptyline (PAMELOR) 10 MG capsule Take 1 capsule (10 mg total) by mouth at bedtime. 90 capsule 3     simvastatin (ZOCOR) 10 MG tablet Take 1 tablet (10 mg total) by mouth at bedtime. 90 tablet 3     triamterene-hydrochlorothiazide (MAXZIDE) 75-50 mg per tablet Take 1 tablet by mouth  "daily. 90 tablet 3     fluconazole (DIFLUCAN) 150 MG tablet Take 1 tablet (150 mg total) by mouth once for 1 dose. 2 tablet 0     No current facility-administered medications for this visit.        Objective:      /68   Pulse 80   Temp 98.6  F (37  C)   Resp 16   Ht 5' 3.75\" (1.619 m)   Wt 199 lb (90.3 kg)   BMI 34.43 kg/m        General appearance: alert, appears stated age and cooperative   exam: With some mild irritation around the internal labia.  Speculum exam is fairly normal, there is no discharge.  She is status post hysterectomy    Recent Results (from the past 168 hour(s))   Urinalysis-UC if Indicated   Result Value Ref Range    Color, UA Yellow Colorless, Yellow, Straw, Light Yellow    Clarity, UA Clear Clear    Glucose, UA Negative Negative    Bilirubin, UA Negative Negative    Ketones, UA Negative Negative    Specific Gravity, UA 1.015 1.005 - 1.030    Blood, UA Negative Negative    pH, UA 7.0 5.0 - 8.0    Protein, UA Negative Negative mg/dL    Urobilinogen, UA 0.2 E.U./dL 0.2 E.U./dL, 1.0 E.U./dL    Nitrite, UA Negative Negative    Leukocytes, UA Trace (!) Negative    WBC, UA 0-5 None Seen, 0-5 hpf    Squam Epithel, UA 25-50 (!) None Seen, 0-5 lpf          This note has been dictated using voice recognition software. Any grammatical or context distortions are unintentional and inherent to the software  "

## 2021-05-30 NOTE — TELEPHONE ENCOUNTER
Seen 2 weeks ago yeast infection.    Took fluconazole.  Also took organic herb and washed with vagasil.    Woke her last night vaginal itching.  Urine is cloudy x 2 days and when she was seen in urgency room 6/24/19.  Skin is itchy.  No discharge noted.  Urgency to urinate started last night.    Last seen March 29 2019.    Wears a pad most of the time for urinary leakage.  Recommended stop douching and using vagisil and scented soaps in private area.  Wash daily. Keep area dry.    Triage recommends she be seen since she she has both itching vaginal symptoms and urinary urgency and cloudiness.    Caller agrees with care advice given. Agreed to call back if patient has additional symptoms or questions.    Fanta Arzola, RN, [facility] Nurse Triage/Med Refills RN

## 2021-05-30 NOTE — TELEPHONE ENCOUNTER
Reason for Disposition    MODERATE-SEVERE itching (i.e., interferes with school, work, or sleep)    Protocols used: VULVAR SYMPTOMS-A-OH

## 2021-05-30 NOTE — TELEPHONE ENCOUNTER
Medication Question or Clarification  Who is calling: Pharmacy: [facility]  What medication are you calling about? (include dose and sig) Premarin Vaginal Cream, insert into the vagina as needed  Who prescribed the medication?: Denice Pendleton FNP   What is your question/concern?: Alternative medication requested, this one is too expensive per patient  Pharmacy: [id number]  Okay to leave a detailed message?: No  Site CMT - Please call the pharmacy to obtain any additional needed information.

## 2021-06-01 VITALS — BODY MASS INDEX: 33.66 KG/M2 | HEIGHT: 65 IN | WEIGHT: 202.06 LBS

## 2021-06-02 ENCOUNTER — OFFICE VISIT - HEALTHEAST (OUTPATIENT)
Dept: FAMILY MEDICINE | Facility: CLINIC | Age: 76
End: 2021-06-02

## 2021-06-02 VITALS — WEIGHT: 203 LBS | BODY MASS INDEX: 33.78 KG/M2

## 2021-06-02 VITALS — HEIGHT: 64 IN | BODY MASS INDEX: 34.45 KG/M2 | WEIGHT: 201.8 LBS

## 2021-06-02 VITALS — WEIGHT: 202.5 LBS | BODY MASS INDEX: 33.7 KG/M2

## 2021-06-02 DIAGNOSIS — M25.511 ACUTE PAIN OF RIGHT SHOULDER: ICD-10-CM

## 2021-06-02 DIAGNOSIS — E66.01 MORBID OBESITY (H): ICD-10-CM

## 2021-06-02 ASSESSMENT — MIFFLIN-ST. JEOR: SCORE: 1393.33

## 2021-06-02 NOTE — TELEPHONE ENCOUNTER
Former patient of Keerthi & has not established care with another provider.  Please assign refill request to covering provider per Clinic standard process.  Refill Approved    Rx renewed per Medication Renewal Policy. Medication was last renewed on 10/19/18.    Amalia Carlson, [facility] Triage/Med Refill 10/17/2019     Requested Prescriptions   Pending Prescriptions Disp Refills     simvastatin (ZOCOR) 10 MG tablet [Pharmacy Med Name: SIMVASTATIN 10MG TABS] 90 tablet 3     Sig: TAKE ONE TABLET BY MOUTH AT BEDTIME       Statins Refill Protocol (Hmg CoA Reductase Inhibitors) Passed - 10/16/2019  6:35 AM        Passed - PCP or prescribing provider visit in past 12 months      Last office visit with prescriber/PCP: 11/27/2018 Denice Pendleton FNP OR same dept: 11/27/2018 Denice Pendleton FNP OR same specialty: 7/9/2019 Denice Carranza MD  Last physical: 3/29/2019 Last MTM visit: Visit date not found   Next visit within 3 mo: Visit date not found  Next physical within 3 mo: Visit date not found  Prescriber OR PCP: KIMBERLY Littlejohn  Last diagnosis associated with med order: There are no diagnoses linked to this encounter.  If protocol passes may refill for 12 months if within 3 months of last provider visit (or a total of 15 months).

## 2021-06-03 VITALS — HEIGHT: 64 IN | WEIGHT: 199 LBS | BODY MASS INDEX: 33.97 KG/M2

## 2021-06-03 VITALS — HEIGHT: 64 IN | WEIGHT: 201 LBS | BODY MASS INDEX: 34.31 KG/M2

## 2021-06-04 VITALS
DIASTOLIC BLOOD PRESSURE: 89 MMHG | HEART RATE: 81 BPM | WEIGHT: 194 LBS | HEIGHT: 64 IN | SYSTOLIC BLOOD PRESSURE: 127 MMHG | BODY MASS INDEX: 33.12 KG/M2 | RESPIRATION RATE: 16 BRPM

## 2021-06-04 VITALS
OXYGEN SATURATION: 96 % | DIASTOLIC BLOOD PRESSURE: 73 MMHG | SYSTOLIC BLOOD PRESSURE: 131 MMHG | TEMPERATURE: 98.8 F | RESPIRATION RATE: 20 BRPM | BODY MASS INDEX: 34.08 KG/M2 | HEART RATE: 96 BPM | WEIGHT: 197 LBS

## 2021-06-04 VITALS
DIASTOLIC BLOOD PRESSURE: 66 MMHG | RESPIRATION RATE: 16 BRPM | BODY MASS INDEX: 33.8 KG/M2 | SYSTOLIC BLOOD PRESSURE: 114 MMHG | WEIGHT: 198 LBS | HEIGHT: 64 IN | TEMPERATURE: 97.8 F | HEART RATE: 62 BPM

## 2021-06-04 NOTE — PROGRESS NOTES
Assessment/ Plan     1. Essential hypertension  Patient comes in today to establish care and for 6-month follow-up of her depression and hypertension.  Her blood pressures under good control with her current medication.  She will notify her pharmacy to send refills to me when they are due.  She is due for her annual wellness next March and we will do fasting blood work at that time.  As she has a lot going on right now, she prefers to wait to schedule her mammogram after that visit.  We will remind her when she comes in at that time.  She has had a reaction to the flu shot in the past.    2. Recurrent major depression in remission (H)  Under good control with her current dose of fluoxetine.  She lost her  a couple of years ago, but feels that she is doing well.  At some point she may want to try weaning off of this, but will stay on it for now as she will be selling her house and having some other stressful things happening.    3. Mixed hyperlipidemia  Stable on her current medication.    4. Obstructive sleep apnea syndrome  She is planning on following up with her pulmonologist for recheck of her sleep apnea.      Subjective:       Candelaria Hewitt is a 74 y.o. female who presents for establish care.  I saw her this summer for some vaginal issues.  We had sent in some estrogen cream, but her insurance does not cover it.  She feels like she is managing things with vagisil soap and occasional external Monistat cream.  She is here also to do a six-month follow-up of her depression and hypertension.  Both of which are under good control with her current medications.  She was hoping at some point to come off some of her medications.  I discussed with her that her statin and blood pressure medicine typically are medications that she will be on long-term unless something is changed dramatically such as weight loss or change in diet.  She is been on the fluoxetine for depression and anxiety for several years.  She  also takes the nortriptyline at bedtime to help her sleep.  She recently sold her house and moved into a house with her son, daughter-in-law, and 3 grandchildren.  She has yet to clean out her house and this is been a little bit stressful.  We decided to give it a little bit more time and we can rediscuss at her annual wellness in March.  She does not get the flu shot as many years ago when she had it she had some leg weakness for about 4 months or so.  The weakness was unexplained and resolved on its own but the theory was it was possibly from the flu shot.    Relevant past medical, family, surgical, and social history reviewed with patient, unless noted in HPI, not pertinent for this visit.  Medications were discussed and reconciled.   Review of Systems   A 12 point comprehensive review of systems was negative except as noted.      Current Outpatient Medications   Medication Sig Dispense Refill     acetaminophen (TYLENOL) 500 MG tablet 650 mg. Tylenol Arthritis       calcium carbonate (OS-HERNANDO) 600 mg calcium (1,500 mg) tablet 2 TABLETS DAILY TWICE DAILY       cholecalciferol, vitamin D3, (VITAMIN D3 ORAL) Take 4,000 Units by mouth daily.       FLUoxetine (PROZAC) 20 MG capsule Take 1 capsule (20 mg total) by mouth daily. 90 capsule 3     glucosamine-chondroitin 500-400 mg cap Take 1 capsule by mouth 2 (two) times a day.              lisinopril (PRINIVIL,ZESTRIL) 10 MG tablet Take 1 tablet (10 mg total) by mouth daily. 90 tablet 3     MULTIVITAMIN/IRON/FOLIC ACID (COMPLETE WOMEN ORAL) Take 1 tablet by mouth daily.              naproxen sodium 220 mg cap Take by mouth as needed.              nortriptyline (PAMELOR) 10 MG capsule Take 1 capsule (10 mg total) by mouth at bedtime. 90 capsule 3     simvastatin (ZOCOR) 10 MG tablet TAKE ONE TABLET BY MOUTH AT BEDTIME 90 tablet 0     triamterene-hydrochlorothiazide (MAXZIDE) 75-50 mg per tablet Take 1 tablet by mouth daily. 90 tablet 3     No current facility-administered  "medications for this visit.        Objective:      /66   Pulse 62   Temp 97.8  F (36.6  C) (Oral)   Resp 16   Ht 5' 3.75\" (1.619 m)   Wt 198 lb (89.8 kg)   BMI 34.25 kg/m        General appearance: alert, appears stated age and cooperative  Lungs: clear to auscultation bilaterally  Heart: regular rate and rhythm, S1, S2 normal, no murmur, click, rub or gallop      No results found for this or any previous visit (from the past 168 hour(s)).       This note has been dictated using voice recognition software. Any grammatical or context distortions are unintentional and inherent to the software  "

## 2021-06-05 VITALS
SYSTOLIC BLOOD PRESSURE: 130 MMHG | HEIGHT: 64 IN | BODY MASS INDEX: 34.11 KG/M2 | DIASTOLIC BLOOD PRESSURE: 74 MMHG | WEIGHT: 199.8 LBS | HEART RATE: 94 BPM | RESPIRATION RATE: 16 BRPM

## 2021-06-05 VITALS
WEIGHT: 194 LBS | OXYGEN SATURATION: 96 % | RESPIRATION RATE: 16 BRPM | TEMPERATURE: 97.6 F | HEART RATE: 93 BPM | SYSTOLIC BLOOD PRESSURE: 130 MMHG | BODY MASS INDEX: 33.83 KG/M2 | DIASTOLIC BLOOD PRESSURE: 77 MMHG

## 2021-06-05 NOTE — PROGRESS NOTES
ASSESSMENT & PLAN:  1. Visit for screening mammogram  Patient declines order be placed now, and wishes to defer until her physical in March.     2. Acute bronchitis  CXR personally reviewed, and negative. With cough persisting for 2 weeks, and feeling fatigued and unwell, discussed treating with empiric antibiotics for bronchitis, and she would like to proceed with this. Rx sent and discussed reasons to be re-evaluated.   - XR Chest 2 Views; Future  - azithromycin (ZITHROMAX) 250 MG tablet; 2 tablets (500 mg) on day 1, then 1 tablet daily (250 mg) daily on days 2 through 5  Dispense: 6 tablet; Refill: 0    3. Yeast vaginitis history  Patient reports a history of yeast vaginitis with antibiotic use. Prefers fluconazole to topical treatment. Rx sent to be used if needed and instructed on use.    - fluconazole (DIFLUCAN) 150 MG tablet; Take 1 tablet (150 mg total) by mouth once for 1 dose. Repeat in 4 days if needed.  Dispense: 1 tablet; Refill: 0    History of depression, on fluoxetine. Discussed options for referral to a therapist given major life changes over past couple of years. She declines at this time. She feels she is managing.       There are no Patient Instructions on file for this visit.    Orders Placed This Encounter   Procedures     XR Chest 2 Views     Standing Status:   Future     Number of Occurrences:   1     Standing Expiration Date:   1/6/2021     Order Specific Question:   Reason for Exam (Describe Symptoms):     Answer:   Cough     Order Specific Question:   Can the procedure be changed per Radiologist protocol?     Answer:   Yes     There are no discontinued medications.    Return in about 3 months (around 4/6/2020) for Annual physical.    CHIEF COMPLAINT:  Chief Complaint   Patient presents with     Cough     intermittent productive cough, chest congestion, PND, headaches, fatigue, x 2 weeks Denies fever       HISTORY OF PRESENT ILLNESS:  Candelaria is a 74 y.o. female presenting to the clinic  today for cough for 2 weeks. The last few days cough feels deeper. Chest feels congested.  Achy around ribs from coughing. Ears plugged. Rhinorrhea and post-nasal drainage. Really fatigued. Headaches. No fever or shortness of breath. No history of asthma. Nonsmoker.     Going through a lot recently.   unexpectedly a couple of years ago. Moved in with son and his family in Nov after selling her house of 50 years. 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 Granddaughter had URI recently. Son has had similar symptoms as patient, but even longer.     REVIEW OF SYSTEMS:   Unable to wear CPAP due to nasal congestion. All other systems are negative.    PFSH:  Patient Active Problem List   Diagnosis     Essential hypertension     Family history of malignant neoplasm of gastrointestinal tract     Impaired fasting glucose     Recurrent major depression in remission (H)     Adiposity     Obstructive sleep apnea syndrome     Hyperlipidemia        TOBACCO USE:  Social History     Tobacco Use   Smoking Status Former Smoker     Types: Cigarettes   Smokeless Tobacco Former User       VITALS:  Vitals:    20 1529   BP: 131/73   Patient Site: Left Arm   Patient Position: Sitting   Cuff Size: Adult Large   Pulse: 96   Resp: 20   Temp: 98.8  F (37.1  C)   TempSrc: Oral   SpO2: 96%   Weight: 197 lb (89.4 kg)     Wt Readings from Last 3 Encounters:   20 197 lb (89.4 kg)   19 198 lb (89.8 kg)   19 199 lb (90.3 kg)     Body mass index is 34.08 kg/m .    PHYSICAL EXAM:  GENERAL: Pleasant, well-appearing patient in no acute distress.   HEENT: Pupils equal round reactive to light. Sclerae and conjunctivae clear. Oropharynx is clear with moist mucous membranes. TMs clear.   NECK: Supple without lymphadenopathy  CARDIOVASCULAR: Heart regular rate and rhythm without murmur normal S1-S2   LUNGS: Clear to auscultation bilaterally, good air movement throughout   EXTREMITIES Warm and well-perfused without edema.   NEURO: Alert and oriented.  Grossly nonfocal.   PSYCHIATRIC: Presents on time and well groomed. Normal speech and thought content. Full affect. No abnormal movements or behaviors noted.      MEDICATIONS:  Current Outpatient Medications   Medication Sig Dispense Refill     acetaminophen (TYLENOL) 500 MG tablet 650 mg. Tylenol Arthritis       calcium carbonate (OS-HERNANDO) 600 mg calcium (1,500 mg) tablet 2 TABLETS DAILY TWICE DAILY       cholecalciferol, vitamin D3, (VITAMIN D3 ORAL) Take 4,000 Units by mouth daily.       FLUoxetine (PROZAC) 20 MG capsule Take 1 capsule (20 mg total) by mouth daily. 90 capsule 3     glucosamine-chondroitin 500-400 mg cap Take 1 capsule by mouth 2 (two) times a day.              lisinopril (PRINIVIL,ZESTRIL) 10 MG tablet Take 1 tablet (10 mg total) by mouth daily. 90 tablet 3     MULTIVITAMIN/IRON/FOLIC ACID (COMPLETE WOMEN ORAL) Take 1 tablet by mouth daily.              naproxen sodium 220 mg cap Take by mouth as needed.              nortriptyline (PAMELOR) 10 MG capsule Take 1 capsule (10 mg total) by mouth at bedtime. 90 capsule 3     simvastatin (ZOCOR) 10 MG tablet TAKE ONE TABLET BY MOUTH AT BEDTIME 90 tablet 0     triamterene-hydrochlorothiazide (MAXZIDE) 75-50 mg per tablet Take 1 tablet by mouth daily. 90 tablet 3     azithromycin (ZITHROMAX) 250 MG tablet 2 tablets (500 mg) on day 1, then 1 tablet daily (250 mg) daily on days 2 through 5 6 tablet 0     fluconazole (DIFLUCAN) 150 MG tablet Take 1 tablet (150 mg total) by mouth once for 1 dose. Repeat in 4 days if needed. 1 tablet 0     No current facility-administered medications for this visit.

## 2021-06-05 NOTE — TELEPHONE ENCOUNTER
Refill Approved    Rx renewed per Medication Renewal Policy. Medication was last renewed on 1/21/1.    Amalia Carlson, [facility] Triage/Med Refill 1/16/2020     Requested Prescriptions   Pending Prescriptions Disp Refills     FLUoxetine (PROZAC) 20 MG capsule [Pharmacy Med Name: FLUOXETINE HCL 20MG CAPS] 90 capsule 3     Sig: TAKE ONE CAPSULE BY MOUTH EVERY DAY       SSRI Refill Protocol  Passed - 1/14/2020  6:34 AM        Passed - PCP or prescribing provider visit in last year     Last office visit with prescriber/PCP: 11/27/2018 Denice Pendleton FNP OR same dept: 1/6/2020 Ilsa Jonas MD OR same specialty: 1/6/2020 Ilsa Jonas MD  Last physical: 3/29/2019 Last MTM visit: Visit date not found   Next visit within 3 mo: Visit date not found  Next physical within 3 mo: Visit date not found  Prescriber OR PCP: KIMBERLY Littlejohn  Last diagnosis associated with med order: 1. Recurrent major depression in remission (H)  - FLUoxetine (PROZAC) 20 MG capsule [Pharmacy Med Name: FLUOXETINE HCL 20MG CAPS]; TAKE ONE CAPSULE BY MOUTH EVERY DAY  Dispense: 90 capsule; Refill: 3  - nortriptyline (PAMELOR) 10 MG capsule [Pharmacy Med Name: NORTRIPTYLINE HCL 10MG CAPS]; TAKE ONE CAPSULE BY MOUTH AT BEDTIME  Dispense: 90 capsule; Refill: 3    2. Essential hypertension  - lisinopril (PRINIVIL,ZESTRIL) 10 MG tablet [Pharmacy Med Name: LISINOPRIL 10MG TABS]; TAKE ONE TABLET BY MOUTH EVERY DAY  Dispense: 90 tablet; Refill: 3  - triamterene-hydrochlorothiazide (MAXZIDE) 75-50 mg per tablet [Pharmacy Med Name: TRIAMTERENE-HCTZ 75-50MG TABS]; TAKE ONE TABLET BY MOUTH EVERY DAY  Dispense: 90 tablet; Refill: 3    If protocol passes may refill for 12 months if within 3 months of last provider visit (or a total of 15 months).             nortriptyline (PAMELOR) 10 MG capsule [Pharmacy Med Name: NORTRIPTYLINE HCL 10MG CAPS] 90 capsule 3     Sig: TAKE ONE CAPSULE BY MOUTH AT BEDTIME       Tricyclics/Misc Antidepressant/Antianxiety Meds  Refill Protocol Passed - 1/14/2020  6:34 AM        Passed - PCP or prescribing provider visit in last year     Last office visit with prescriber/PCP: 11/27/2018 Denice Pendleton FNP OR same dept: 1/6/2020 Ilsa Jonas MD OR same specialty: 1/6/2020 Ilsa Jonas MD  Last physical: 3/29/2019 Last MTM visit: Visit date not found   Next visit within 3 mo: Visit date not found  Next physical within 3 mo: Visit date not found  Prescriber OR PCP: KIMBERLY Littlejohn  Last diagnosis associated with med order: 1. Recurrent major depression in remission (H)  - FLUoxetine (PROZAC) 20 MG capsule [Pharmacy Med Name: FLUOXETINE HCL 20MG CAPS]; TAKE ONE CAPSULE BY MOUTH EVERY DAY  Dispense: 90 capsule; Refill: 3  - nortriptyline (PAMELOR) 10 MG capsule [Pharmacy Med Name: NORTRIPTYLINE HCL 10MG CAPS]; TAKE ONE CAPSULE BY MOUTH AT BEDTIME  Dispense: 90 capsule; Refill: 3    2. Essential hypertension  - lisinopril (PRINIVIL,ZESTRIL) 10 MG tablet [Pharmacy Med Name: LISINOPRIL 10MG TABS]; TAKE ONE TABLET BY MOUTH EVERY DAY  Dispense: 90 tablet; Refill: 3  - triamterene-hydrochlorothiazide (MAXZIDE) 75-50 mg per tablet [Pharmacy Med Name: TRIAMTERENE-HCTZ 75-50MG TABS]; TAKE ONE TABLET BY MOUTH EVERY DAY  Dispense: 90 tablet; Refill: 3    If protocol passes may refill for 12 months if within 3 months of last provider visit (or a total of 15 months).             lisinopril (PRINIVIL,ZESTRIL) 10 MG tablet [Pharmacy Med Name: LISINOPRIL 10MG TABS] 90 tablet 3     Sig: TAKE ONE TABLET BY MOUTH EVERY DAY       Ace Inhibitors Refill Protocol Passed - 1/14/2020  6:34 AM        Passed - PCP or prescribing provider visit in past 12 months       Last office visit with prescriber/PCP: 11/27/2018 Denice Pendleton FNP OR [place] dept: 1/6/2020 Ilsa Jonas MD OR same specialty: 1/6/2020 Ilsa Jonas MD  Last physical: 3/29/2019 Last MTM visit: Visit date not found   Next visit within 3 mo: Visit date not found  Next physical within 3  mo: Visit date not found  Prescriber OR PCP: KIMBERLY Littlejohn  Last diagnosis associated with med order: 1. Recurrent major depression in remission (H)  - FLUoxetine (PROZAC) 20 MG capsule [Pharmacy Med Name: FLUOXETINE HCL 20MG CAPS]; TAKE ONE CAPSULE BY MOUTH EVERY DAY  Dispense: 90 capsule; Refill: 3  - nortriptyline (PAMELOR) 10 MG capsule [Pharmacy Med Name: NORTRIPTYLINE HCL 10MG CAPS]; TAKE ONE CAPSULE BY MOUTH AT BEDTIME  Dispense: 90 capsule; Refill: 3    2. Essential hypertension  - lisinopril (PRINIVIL,ZESTRIL) 10 MG tablet [Pharmacy Med Name: LISINOPRIL 10MG TABS]; TAKE ONE TABLET BY MOUTH EVERY DAY  Dispense: 90 tablet; Refill: 3  - triamterene-hydrochlorothiazide (MAXZIDE) 75-50 mg per tablet [Pharmacy Med Name: TRIAMTERENE-HCTZ 75-50MG TABS]; TAKE ONE TABLET BY MOUTH EVERY DAY  Dispense: 90 tablet; Refill: 3    If protocol passes may refill for 12 months if within 3 months of last provider visit (or a total of 15 months).             Passed - Serum Potassium in past 12 months     Lab Results   Component Value Date    Potassium 4.1 03/29/2019             Passed - Blood pressure filed in past 12 months     BP Readings from Last 1 Encounters:   01/06/20 131/73             Passed - Serum Creatinine in past 12 months     Creatinine   Date Value Ref Range Status   03/29/2019 0.73 0.60 - 1.10 mg/dL Final             triamterene-hydrochlorothiazide (MAXZIDE) 75-50 mg per tablet [Pharmacy Med Name: TRIAMTERENE-HCTZ 75-50MG TABS] 90 tablet 3     Sig: TAKE ONE TABLET BY MOUTH EVERY DAY       Diuretics/Combination Diuretics Refill Protocol  Passed - 1/14/2020  6:34 AM        Passed - Visit with PCP or prescribing provider visit in past 12 months     Last office visit with prescriber/PCP: 11/27/2018 Denice Pendleton FNP OR same dept: 1/6/2020 Ilsa Jonas MD OR same specialty: 1/6/2020 Ilsa Jonas MD  Last physical: 3/29/2019 Last MTM visit: Visit date not found   Next visit within 3 mo: Visit date not  found  Next physical within 3 mo: Visit date not found  Prescriber OR PCP: KIMBERLY Littlejohn  Last diagnosis associated with med order: 1. Recurrent major depression in remission (H)  - FLUoxetine (PROZAC) 20 MG capsule [Pharmacy Med Name: FLUOXETINE HCL 20MG CAPS]; TAKE ONE CAPSULE BY MOUTH EVERY DAY  Dispense: 90 capsule; Refill: 3  - nortriptyline (PAMELOR) 10 MG capsule [Pharmacy Med Name: NORTRIPTYLINE HCL 10MG CAPS]; TAKE ONE CAPSULE BY MOUTH AT BEDTIME  Dispense: 90 capsule; Refill: 3    2. Essential hypertension  - lisinopril (PRINIVIL,ZESTRIL) 10 MG tablet [Pharmacy Med Name: LISINOPRIL 10MG TABS]; TAKE ONE TABLET BY MOUTH EVERY DAY  Dispense: 90 tablet; Refill: 3  - triamterene-hydrochlorothiazide (MAXZIDE) 75-50 mg per tablet [Pharmacy Med Name: TRIAMTERENE-HCTZ 75-50MG TABS]; TAKE ONE TABLET BY MOUTH EVERY DAY  Dispense: 90 tablet; Refill: 3    If protocol passes may refill for 12 months if within 3 months of last provider visit (or a total of 15 months).             Passed - Serum Potassium in past 12 months      Lab Results   Component Value Date    Potassium 4.1 03/29/2019             Passed - Serum Sodium in past 12 months      Lab Results   Component Value Date    Sodium 138 03/29/2019             Passed - Blood pressure on file in past 12 months     BP Readings from Last 1 Encounters:   01/06/20 131/73             Passed - Serum Creatinine in past 12 months      Creatinine   Date Value Ref Range Status   03/29/2019 0.73 0.60 - 1.10 mg/dL Final

## 2021-06-05 NOTE — TELEPHONE ENCOUNTER
Refill Approved    Rx renewed per Medication Renewal Policy. Medication was last renewed on 10/17/19.    Amalia Carlson, [facility] Triage/Med Refill 1/16/2020     Requested Prescriptions   Pending Prescriptions Disp Refills     simvastatin (ZOCOR) 10 MG tablet [Pharmacy Med Name: SIMVASTATIN 10MG TABS] 90 tablet 0     Sig: TAKE ONE TABLET BY MOUTH AT BEDTIME       Statins Refill Protocol (Hmg CoA Reductase Inhibitors) Passed - 1/14/2020  6:34 AM        Passed - PCP or prescribing provider visit in past 12 months      Last office visit with prescriber/PCP: Visit date not found OR same dept: 1/6/2020 Ilsa Jonas MD OR same specialty: 1/6/2020 Ilsa Jonas MD  Last physical: Visit date not found Last MTM visit: Visit date not found   Next visit within 3 mo: Visit date not found  Next physical within 3 mo: Visit date not found  Prescriber OR PCP: Olga Roman MD  Last diagnosis associated with med order: 1. Mixed hyperlipidemia  - simvastatin (ZOCOR) 10 MG tablet [Pharmacy Med Name: SIMVASTATIN 10MG TABS]; TAKE ONE TABLET BY MOUTH AT BEDTIME  Dispense: 90 tablet; Refill: 0    If protocol passes may refill for 12 months if within 3 months of last provider visit (or a total of 15 months).

## 2021-06-07 NOTE — TELEPHONE ENCOUNTER
Refill Approved    Rx renewed per Medication Renewal Policy. Medication was last renewed on 1/16/20.    Amalia Carlson, [facility] Triage/Med Refill 3/25/2020     Requested Prescriptions   Pending Prescriptions Disp Refills     lisinopriL (PRINIVIL,ZESTRIL) 10 MG tablet [Pharmacy Med Name: LISINOPRIL 10MG TABS] 90 tablet 0     Sig: TAKE ONE TABLET BY MOUTH EVERY DAY       Ace Inhibitors Refill Protocol Passed - 3/24/2020  2:14 PM        Passed - PCP or prescribing provider visit in past 12 months       Last office visit with prescriber/PCP: 11/27/2018 Denice Pendleton FNP OR same dept: 1/6/2020 Ilsa Jonas MD OR same specialty: 1/6/2020 Ilsa Jonas MD  Last physical: 3/29/2019 Last MTM visit: Visit date not found   Next visit within 3 mo: Visit date not found  Next physical within 3 mo: Visit date not found  Prescriber OR PCP: KIMBERLY Littlejohn  Last diagnosis associated with med order: 1. Essential hypertension  - lisinopriL (PRINIVIL,ZESTRIL) 10 MG tablet [Pharmacy Med Name: LISINOPRIL 10MG TABS]; TAKE ONE TABLET BY MOUTH EVERY DAY  Dispense: 90 tablet; Refill: 0  - triamterene-hydrochlorothiazide (MAXZIDE) 75-50 mg per tablet [Pharmacy Med Name: TRIAMTERENE-HCTZ 75-50MG TABS]; TAKE ONE TABLET BY MOUTH EVERY DAY  Dispense: 90 tablet; Refill: 0    If protocol passes may refill for 12 months if within 3 months of last provider visit (or a total of 15 months).             Passed - Serum Potassium in past 12 months     Lab Results   Component Value Date    Potassium 4.1 03/29/2019             Passed - Blood pressure filed in past 12 months     BP Readings from Last 1 Encounters:   01/06/20 131/73             Passed - Serum Creatinine in past 12 months     Creatinine   Date Value Ref Range Status   03/29/2019 0.73 0.60 - 1.10 mg/dL Final                triamterene-hydrochlorothiazide (MAXZIDE) 75-50 mg per tablet [Pharmacy Med Name: TRIAMTERENE-HCTZ 75-50MG TABS] 90 tablet 0     Sig: TAKE ONE TABLET BY  MOUTH EVERY DAY       Diuretics/Combination Diuretics Refill Protocol  Passed - 3/24/2020  2:14 PM        Passed - Visit with PCP or prescribing provider visit in past 12 months     Last office visit with prescriber/PCP: 11/27/2018 Denice Pendleton FNP OR same dept: 1/6/2020 Ilsa Jonas MD OR same specialty: 1/6/2020 Ilsa Jonas MD  Last physical: 3/29/2019 Last MTM visit: Visit date not found   Next visit within 3 mo: Visit date not found  Next physical within 3 mo: Visit date not found  Prescriber OR PCP: KIMBERLY Littlejohn  Last diagnosis associated with med order: 1. Essential hypertension  - lisinopriL (PRINIVIL,ZESTRIL) 10 MG tablet [Pharmacy Med Name: LISINOPRIL 10MG TABS]; TAKE ONE TABLET BY MOUTH EVERY DAY  Dispense: 90 tablet; Refill: 0  - triamterene-hydrochlorothiazide (MAXZIDE) 75-50 mg per tablet [Pharmacy Med Name: TRIAMTERENE-HCTZ 75-50MG TABS]; TAKE ONE TABLET BY MOUTH EVERY DAY  Dispense: 90 tablet; Refill: 0    If protocol passes may refill for 12 months if within 3 months of last provider visit (or a total of 15 months).             Passed - Serum Potassium in past 12 months      Lab Results   Component Value Date    Potassium 4.1 03/29/2019             Passed - Serum Sodium in past 12 months      Lab Results   Component Value Date    Sodium 138 03/29/2019             Passed - Blood pressure on file in past 12 months     BP Readings from Last 1 Encounters:   01/06/20 131/73             Passed - Serum Creatinine in past 12 months      Creatinine   Date Value Ref Range Status   03/29/2019 0.73 0.60 - 1.10 mg/dL Final

## 2021-06-09 NOTE — TELEPHONE ENCOUNTER
Refill Request  Did you contact pharmacy: Yes.  Date: Today  Medication name:   Requested Prescriptions     Pending Prescriptions Disp Refills     lisinopriL (PRINIVIL,ZESTRIL) 10 MG tablet 90 tablet 0     Sig: Take 1 tablet (10 mg total) by mouth daily.     triamterene-hydrochlorothiazide (MAXZIDE) 75-50 mg per tablet 90 tablet 0     Sig: Take 1 tablet by mouth daily.     Who prescribed the medication: Denice Carranza MD   Requested Pharmacy: [facility]   Is patient out of medication: No.  14 days left  Patient notified refills processed in 3 business days:  yes  Okay to leave a detailed message: yes

## 2021-06-09 NOTE — TELEPHONE ENCOUNTER
"RN Triage  Candelaria is calling today to reschedule appointment with Dr. Carranza (canceled form 3/30/2020) for yearly physical. Also reporting that last night as she was taking her rings off, noticed her right and ring finger was \"black and blue.\" Not swollen or painful. Was making dinner and unscrewed cap to a jar of spaghetti sauce, wondered if she pinched a blood vessel. 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  Feels just a slight weakness in her finger, not numb or painful. Candelaria does not take blood thinning medications.    I advised per care advice, Candelaria would like to be evaluated regarding her finger so I recommended that she schedule a virtual visit with a provider this week. Scheduling to assist with virtual visit and to schedule in office annual exam with PCP as available.    Kailey Ojeda RN  [facility] Nurse Advisor      Reason for Disposition    Patient wants to be seen    Additional Information    Negative: Amputation    Negative: High-pressure injection injury (e.g., from paint gun, usually work-related)    Negative: Looks like a broken bone or dislocated joint (e.g., crooked or deformed)    Negative: Skin is split open or gaping (length > 1/2 inch or 12 mm)    Negative: Cut or scrape is very deep (e.g., can see bone or tendons)    Negative: Bleeding won't stop after 10 minutes of direct pressure (using correct technique)    Negative: Dirt in the wound and not removed after 15 minutes of scrubbing    Negative: Cut with numbness (loss of sensation) of finger    Negative: Fingernail is partially torn from a crush injury (EXCEPTION: torn nail from catching it on something)    Negative: Sounds like a serious injury to the triager    Negative: Looks infected (e.g., spreading redness, pus, red streak)    Negative: Fingernail is completely torn off    Negative: Base of fingernail has popped out from under skin " fold (cuticle)    Protocols used: FINGER INJURY-A-OH    COVID 19 Nurse Triage Plan/Patient Instructions    Please be aware that novel coronavirus (COVID-19) may be circulating in the community. If you develop symptoms such as fever, cough, or SOB or if you have concerns about the presence of another infection including coronavirus (COVID-19), please contact your health care provider or visit [email or web address].     Disposition/Instructions    Virtual Visit with provider recommended. Reference Visit Selection Guide.    Thank you for taking steps to prevent the spread of this virus.  o Limit your contact with others.  o Wear a simple mask to cover your cough.  o Wash your hands well and often.    Resources    [facility]: About COVID-19: [email or web address]    CDC: What to Do If You're Sick: www.cdc.gov/coronavirus/2019-ncov/about/steps-when-sick.html    CDC: Ending Home Isolation: www.cdc.gov/coronavirus/2019-ncov/hcp/disposition-in-home-patients.html     CDC: Caring for Someone: www.cdc.gov/coronavirus/2019-ncov/if-you-are-sick/care-for-someone.html     [facility]: Interim Guidance for Hospital Discharge to Home: www.health.[facility].mn.us/diseases/coronavirus/hcp/hospdischarge.pdf    [facility] clinical trials (COVID-19 research studies): [email or web address]     Below are the COVID-19 hotlines at the [facility] ([facility]). Interpreters are available.   o For health questions: Call 612-074-0044 or [phone] (7 a.m. to 7 p.m.)  o For questions about schools and childcare: Call 908-375-8377 or [phone] (7 a.m. to 7 p.m.)

## 2021-06-09 NOTE — PROGRESS NOTES
"Candelaria Hewitt is a 74 y.o. female who is being evaluated via a billable telephone visit.      The patient has been notified of following:     \"This telephone visit will be conducted via a call between you and your physician/provider. We have found that certain health care needs can be provided without the need for a physical exam.  This service lets us provide the care you need with a short phone conversation.  If a prescription is necessary we can send it directly to your pharmacy.  If lab work is needed we can place an order for that and you can then stop by our lab to have the test done at a later time.    Telephone visits are billed at different rates depending on your insurance coverage. During this emergency period, for some insurers they may be billed the same as an in-person visit.  Please reach out to your insurance provider with any questions.    If during the course of the call the physician/provider feels a telephone visit is not appropriate, you will not be charged for this service.\"    Patient has given verbal consent to a Telephone visit? Yes    What phone number would you like to be contacted at? [phone]    Patient would like to receive their AVS by AVS Preference: Car.    [facility] Note    Name: Candelaria Hewitt  : 1945   MRN: [id number]    Candelaria Hewitt is a 74 y.o. female presenting to discuss the following:     CC:   Chief Complaint   Patient presents with     Hand Pain     Bruised right ring finger.       HPI:  Candelaria notes she made dinner last night and was knitting. Went to get ready for bed, took off rings, and on right hand ring finger noticed finger was black and blue, discolored from MCP to DIP, tip was spared. Finger isn't swollen. History of arthritis. Not burning, not painful, just appeared. In retrospect, she remembers carrying heavy grocery bags the other day and wonders if that may also be contributing.    Doesn't remember any injuries. Had a hard " time opening the [facility] jar, wondering if she pinched something. 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     Does take Aleve regularly for knee pain.     No other bruises on her body.    ROS:   See HPI above.     PMH:   Patient Active Problem List   Diagnosis     Essential hypertension     Family history of malignant neoplasm of gastrointestinal tract     Impaired fasting glucose     Recurrent major depression in remission (H)     Adiposity     Obstructive sleep apnea syndrome     Hyperlipidemia       Past Medical History:   Diagnosis Date     Depression      Hyperlipidemia      Hypertension        PSH:   Past Surgical History:   Procedure Laterality Date     ECTOPIC PREGNANCY SURGERY       HYSTERECTOMY           MEDICATIONS:   Current Outpatient Medications on File Prior to Visit   Medication Sig Dispense Refill     calcium carbonate (OS-HERNANDO) 600 mg calcium (1,500 mg) tablet 2 TABLETS DAILY TWICE DAILY       cholecalciferol, vitamin D3, (VITAMIN D3 ORAL) Take 4,000 Units by mouth daily.       FLUoxetine (PROZAC) 20 MG capsule TAKE ONE CAPSULE BY MOUTH EVERY DAY 90 capsule 3     glucosamine-chondroitin 500-400 mg cap Take 1 capsule by mouth 2 (two) times a day.              lisinopriL (PRINIVIL,ZESTRIL) 10 MG tablet Take 1 tablet (10 mg total) by mouth daily. 90 tablet 1     MULTIVITAMIN/IRON/FOLIC ACID (COMPLETE WOMEN ORAL) Take 1 tablet by mouth daily.              naproxen sodium 220 mg cap Take by mouth as needed.              nortriptyline (PAMELOR) 10 MG capsule TAKE ONE CAPSULE BY MOUTH AT BEDTIME 90 capsule 3     simvastatin (ZOCOR) 10 MG tablet TAKE ONE TABLET BY MOUTH AT BEDTIME 90 tablet 3     triamterene-hydrochlorothiazide (MAXZIDE) 75-50 mg per tablet Take 1 tablet by mouth daily. 90 tablet 1     acetaminophen (TYLENOL) 500 MG tablet 650 mg. Tylenol Arthritis       [DISCONTINUED] azithromycin (ZITHROMAX) 250 MG tablet 2 tablets (500 mg) on day 1, then 1 tablet daily (250  mg) daily on days 2 through 5 6 tablet 0     No current facility-administered medications on file prior to visit.        ALLERGIES:  Allergies   Allergen Reactions     Influenza Vaccine Tri-Sp 09-10      Leg weakness for months, ? GBS     Morphine Nausea Only     Tolerate vicodin and codeine     Methocarbamol Rash       SOCIAL HISTORY:   Social History     Tobacco Use     Smoking status: Former Smoker     Types: Cigarettes     Smokeless tobacco: Former User   Substance Use Topics     Alcohol use: Yes     Alcohol/week: 1.0 standard drinks     Types: 1 Glasses of wine per week     Drug use: No         PHYSICAL EXAM:   Unable to obtain vitals or perform physical exam due to telephone visit.     ASSESSMENT & PLAN:   Candelaria Hewitt is a 74 y.o. female presenting today for evaluation of discoloration right ring finger.    1. Contusion of right ring finger without damage to nail, initial encounter  Reviewed Candelaria's history today.  No known trauma or injuries.  No pain or reported changes in temperature.  Most likely etiology is mild trauma rupturing blood vessel causing a bruise.  She is worried about the possibility of oxygen back syndrome or some other vascular etiology.  Reviewed with patient that this is a benign condition and quite rare.  I less suspect this without any pain.  Additionally, would be unlikely to have new onset at 74 years old.     For now, recommend she continue to monitor for evolution of symptoms or developing pain or temperature changes.  Anticipate bruising will improve over the next 1 to 2 weeks.  If she is having evolving symptoms, we can reassess at her wellness visit on August 18.  If developing pain or temperature changes, she should reach out sooner for further evaluation.    RTC: Scheduled for medicare wellness exam on 8/18/20 with PCP    Ciera Mayers DO     Phone call duration:  7 minutes

## 2021-06-09 NOTE — TELEPHONE ENCOUNTER
Dr. Carranza-  Faxed refill request for Lisinopril and Triamterene/HCTZ.  Rx queued up for MD approval.  Last OV with you was 12/19/19.

## 2021-06-10 NOTE — TELEPHONE ENCOUNTER
Refill Request  Did you contact pharmacy: Yes  Medication name:   Requested Prescriptions     Pending Prescriptions Disp Refills     FLUoxetine (PROZAC) 20 MG capsule 90 capsule 3     Sig: Take 1 capsule (20 mg total) by mouth daily.     triamterene-hydrochlorothiazide (MAXZIDE) 75-50 mg per tablet 90 tablet 1     Sig: Take 1 tablet by mouth daily.     lisinopriL (PRINIVIL,ZESTRIL) 10 MG tablet 90 tablet 1     Sig: Take 1 tablet (10 mg total) by mouth daily.     nortriptyline (PAMELOR) 10 MG capsule 90 capsule 3     Sig: Take 1 capsule (10 mg total) by mouth at bedtime.     simvastatin (ZOCOR) 10 MG tablet 90 tablet 3     Sig: Take 1 tablet (10 mg total) by mouth at bedtime.     Who prescribed the medication: Denice Carranza MD and PEPE TAN  Requested Pharmacy[facility]   Is patient out of medication: Less than 1 week left.  Patient notified refills processed in 3 business days:  no  Okay to leave a detailed message: yes    FYI: Patient is changing pharmacy.

## 2021-06-10 NOTE — PROGRESS NOTES
Assessment and Plan:       1. Medicare annual wellness visit, subsequent  As far as his care maintenance, she is due for her mammogram this year.  She had a normal colonoscopy in 2018 and they did not recommend any further colonoscopies.  She had a normal bone density in 2019 so will be due in 2024.  She is up-to-date on immunizations.  She is getting regular dental and eye exams.  PHQ 2 equals 0  Mini cog equals 5 out of 5  No falls risk.  Some chronic urinary incontinence, not severe enough that she wants to pursue anything further.    2. Visit for screening mammogram  - Mammo Screening Bilateral; Future    3. Essential hypertension  Under good control with her current medication.  - Comprehensive Metabolic Panel  - [facility](CBC w/o Differential)    4. Impaired fasting glucose  She has a prior history of some insulin resistance.  We will check her A1c today.  BMI equals 33.  - Glycosylated Hemoglobin A1c    5. Obstructive sleep apnea syndrome  This has been stable.    6. Mixed hyperlipidemia  Stable on her current statin.  - Lipid Cascade    7. Recurrent major depression in remission (H)  Under good control with fluoxetine.    8. History of chronic cough  Patient requests COVID antibody testing.  She states she had a febrile illness and cough in February which was likely influenza.  - COVID-19 Virus (Coronavirus) Antibody & Titer Reflex; Future     The patient's current medical problems were reviewed.      The following health maintenance schedule was reviewed with the patient and provided in printed form in the after visit summary:   Health Maintenance   Topic Date Due     DEPRESSION ACTION PLAN  1945     HEPATITIS C SCREENING  1945     MAMMOGRAM  02/27/2020     MEDICARE ANNUAL WELLNESS VISIT  03/29/2020     FALL RISK ASSESSMENT  03/29/2020     INFLUENZA VACCINE RULE BASED (1) 08/01/2020     DXA SCAN  04/25/2021     LIPID  03/29/2024     ADVANCE CARE PLANNING  03/29/2024     TD 18+ HE  02/09/2028      COLORECTAL CANCER SCREENING  04/23/2028     PNEUMOCOCCAL IMMUNIZATION 65+ LOW/MEDIUM RISK  Completed     ZOSTER VACCINES  Completed     HEPATITIS B VACCINES  Aged Out        Subjective:   Chief Complaint: Candelaria Hewitt is an 74 y.o. female here for an Annual Wellness visit.   HPI: Overall, Candelaria has been doing fairly well.  She is currently living with her son, daughter-in-law, and grandchildren.  This is working out well for her.  She is keeping busy.  She feels her mood has been under good control despite the pandemic.  She has to get her dental and eye visits rescheduled.  She is up-to-date on immunizations.  As far as healthcare maintenance, she due for a mammogram and she is willing to do that.  She had a normal bone density in 2019 and a normal colonoscopy in 2015.  She does not feel a lot of activity but trying to stay busy.  She does not think she has any falls risk.  She does have some mild urinary incontinence, but is not severe enough that she would want to do anything about it at this point.  She think she had COVID back in February because she had a fever and a prolonged cough.  I discussed with her it likely was not COVID as we were not seeing at that early but can go ahead and check the antibodies.  She was wondering about blood type, but I discussed the studies really are not that significant and it would not change anything that she was going to do.  Also, her insurance likely will not cover it.    Review of Systems:    Please see above.  The rest of the review of systems are negative for all systems.    Patient Care Team:  Denice Carranza MD as PCP - General (Family Medicine)  Papito Valencia OD as Physician (Optometry)  Denice Carranza MD as Assigned PCP     Patient Active Problem List   Diagnosis     Essential hypertension     Family history of malignant neoplasm of gastrointestinal tract     Impaired fasting glucose     Recurrent major depression in remission (H)     Adiposity     Obstructive  sleep apnea syndrome     Hyperlipidemia     Varicose veins of both lower extremities with pain     Past Medical History:   Diagnosis Date     Depression      Hyperlipidemia      Hypertension       Past Surgical History:   Procedure Laterality Date     ECTOPIC PREGNANCY SURGERY       HYSTERECTOMY        Family History   Problem Relation Age of Onset     Hypertension Mother      Kidney disease Mother      Colon cancer Father      Alcohol abuse Father      Diabetes Father       Social History     Socioeconomic History     Marital status:      Spouse name: Not on file     Number of children: Not on file     Years of education: Not on file     Highest education level: Not on file   Occupational History     Not on file   Social Needs     Financial resource strain: Not on file     Food insecurity     Worry: Not on file     Inability: Not on file     Transportation needs     Medical: Not on file     Non-medical: Not on file   Tobacco Use     Smoking status: Former Smoker     Types: Cigarettes     Smokeless tobacco: Former User   Substance and Sexual Activity     Alcohol use: Yes     Alcohol/week: 1.0 standard drinks     Types: 1 Glasses of wine per week     Drug use: No     Sexual activity: Not Currently     Partners: Male   Lifestyle     Physical activity     Days per week: Not on file     Minutes per session: Not on file     Stress: Not on file   Relationships     Social connections     Talks on phone: Not on file     Gets together: Not on file     Attends [employer] service: Not on file     Active member of club or organization: Not on file     Attends meetings of clubs or organizations: Not on file     Relationship status: Not on file     Intimate partner violence     Fear of current or ex partner: Not on file     Emotionally abused: Not on file     Physically abused: Not on file     Forced sexual activity: Not on file   Other Topics Concern     Not on file   Social History Narrative     Not on file      Current  "Outpatient Medications   Medication Sig Dispense Refill     acetaminophen (TYLENOL) 500 MG tablet 650 mg. Tylenol Arthritis       calcium carbonate (OS-HERNANDO) 600 mg calcium (1,500 mg) tablet 2 TABLETS DAILY TWICE DAILY       cholecalciferol, vitamin D3, (VITAMIN D3 ORAL) Take 4,000 Units by mouth daily.       FLUoxetine (PROZAC) 20 MG capsule Take 1 capsule (20 mg total) by mouth daily. 90 capsule 0     glucosamine-chondroitin 500-400 mg cap Take 1 capsule by mouth 2 (two) times a day.              lisinopriL (PRINIVIL,ZESTRIL) 10 MG tablet Take 1 tablet (10 mg total) by mouth daily. 90 tablet 0     MULTIVITAMIN/IRON/FOLIC ACID (COMPLETE WOMEN ORAL) Take 1 tablet by mouth daily.              naproxen sodium 220 mg cap Take by mouth as needed.              nortriptyline (PAMELOR) 10 MG capsule Take 1 capsule (10 mg total) by mouth at bedtime. 90 capsule 0     simvastatin (ZOCOR) 10 MG tablet Take 1 tablet (10 mg total) by mouth at bedtime. 90 tablet 0     triamterene-hydrochlorothiazide (MAXZIDE) 75-50 mg per tablet Take 1 tablet by mouth daily. 90 tablet 0     No current facility-administered medications for this visit.       Objective:   Vital Signs:   Visit Vitals  /89   Pulse 81   Resp 16   Ht 5' 3.5\" (1.613 m)   Wt 194 lb (88 kg)   BMI 33.83 kg/m           VisionScreening:  No exam data present     PHYSICAL EXAM    Physical Exam:  General Appearance: Alert, cooperative, no distress, appears stated age  Head: Normocephalic, without obvious abnormality, atraumatic  Eyes: PERRL, conjunctiva/corneas clear, EOM's intact  Ears: Normal TM's and external ear canals, both ears  Nose: Nares normal, septum midline,mucosa normal, no drainage  Throat: Lips, mucosa, and tongue normal; teeth and gums normal  Neck: Supple, symmetrical, trachea midline, no adenopathy; thyroid: not enlarged, symmetric, no tenderness/mass/nodules; no carotid bruit  Back: Symmetric, no curvature, ROM normal, no CVA tenderness  Lungs: Clear to " auscultation bilaterally, respirations unlabored  Breasts: No breast masses, tenderness, asymmetry, or nipple discharge  Heart: Regular rate and rhythm, S1 and S2 normal, no murmur, rub, or gallop, Abdomen: Soft, non-tender, bowel sounds active all four quadrants,  no masses, no organomegaly  Extremities: Extremities normal, atraumatic, no cyanosis or edema  Skin: Skin color, texture, turgor normal, no rashes or lesions  Lymph nodes: Cervical, supraclavicular, and axillary nodes normal  Neurologic: Normal          Assessment Results 8/18/2020   Activities of Daily Living No help needed   Instrumental Activities of Daily Living 1 - Full function   Mini Cog Total Score 5   Some recent data might be hidden     A Mini-Cog score of 0-2 suggests the possibility of dementia, score of 3-5 suggests no dementia  Falls risk and cognitive assessment are done and normal, see flowsheet.  Identified Health Risks:     She is at risk for lack of exercise and has been provided with information to increase physical activity for the benefit of her well-being.  The patient reports that she has difficulty with instrumental activities of daily living. The patient was provided with written information regarding signs of hearing loss.  Information on urinary incontinence and treatment options given to patient.  Patient's advanced directive was discussed and I am comfortable with the patient's wishes.

## 2021-06-11 NOTE — PROGRESS NOTES
Walk In Care Note                                                        Date of Visit: 9/14/2020     Chief Complaint   Candelaria Hewitt is a(n) 74 y.o. White or  female who presents to Walk In Care with the following complaint(s):  Tailbone Pain (x2d, L back pain, unable to control bladder)       Assessment and Plan   1. Intertrigo  - nystatin (MYCOSTATIN) cream; Apply to the affected area of the gluteal cleft two times a day for 14 days.  Dispense: 30 g; Refill: 1  - triamcinolone (KENALOG) 0.1 % cream; Apply to the affected area of the gluteal cleft two times a day for 14 days.  Dispense: 30 g; Refill: 1    2. Back pain  - Urinalysis-UC if Indicated      Treating intertrigo of the gluteal cleft with nystatin and triamcinolone. Recommended that patient try to keep this area clean / dry and consider wearing looser clothing until symptoms resolve. Informed patient that urinalysis shows no signs of urinary tract infection. Low back pain is consistent with mild muscle spasm. Discussed use of heat and stretching to manage symptoms.     Counseled patient regarding assessment and plan for evaluation and treatment. Questions were answered. See AVS for the specific written instructions and educational handout(s) regarding Candidal skin infection that were provided at the conclusion of the visit.     Discussed signs / symptoms that warrant urgent / emergent medical attention.     Follow up within 4 days if symptoms persist.      History of Present Illness   Primary symptom: Skin itching and irritation  Onset: 2 days ago  Location: Gluteal cleft  Progression: Persisting  Spontaneous drainage: No  Spontaneous bleeding: No  Fevers: No  Chills: No  Home therapies utilized: Cleansing, Preparation H (without relief), triple antibiotic ointment (with some relief), and acetaminophen.   Known injury: No  History of cellulitis: No  History of abscess: No  History of MRSA: No    Additional symptom: Back pain  Onset: Overnight  last night  Progression: Persisting  Laterality/Location: Left lower  Quality: Pulling  Pain rating (0-10): 4  Frequency: Constant  Exacerbating factors: Position changes  Relieving factors: Rest  Radicular pain: No  Lower extremity weakness: No  Lower extremity paresthesias: No  Saddle anesthesia: No  Bowel incontinence: No  Bladder incontinence: Chronic  Known injury: No  Home treatments utilized: Naproxen and acetaminophen    Additional symptom: Urinary symptoms  Onset: Yesterday  Progression: Improved  Dysuria: No  Polyuria: Yes  Nocturia: Yes  Urgency: Yes, with leaking  Hematuria: No  Suprapubic pain: No  Back / flank pain: No flank pain  Vaginal symptoms: Denies discharge, itching, odor, and irritation.   Fevers: No  Chills: No  History of urinary tract infection: Yes  History of pyelonephritis: No  Menstrual status: Status post hysterectomy     Review of Systems   Review of Systems   All other systems reviewed and are negative.       Physical Exam   Vitals:    09/14/20 1625   BP: 130/77   Patient Site: Left Arm   Patient Position: Sitting   Cuff Size: Adult Regular   Pulse: 93   Resp: 16   Temp: 97.6  F (36.4  C)   TempSrc: Oral   SpO2: 96%   Weight: 194 lb (88 kg)     Physical Exam  Vitals signs and nursing note reviewed.   Constitutional:       General: She is not in acute distress.     Appearance: She is well-developed and overweight. She is not ill-appearing, toxic-appearing or diaphoretic.   Cardiovascular:      Rate and Rhythm: Normal rate and regular rhythm.      Heart sounds: S1 normal and S2 normal. No murmur. No friction rub. No gallop.    Pulmonary:      Effort: Pulmonary effort is normal.      Breath sounds: Normal breath sounds. No stridor. No wheezing, rhonchi or rales.   Abdominal:      General: Bowel sounds are normal. There is no distension.      Palpations: Abdomen is soft.      Tenderness: There is no abdominal tenderness. There is no right CVA tenderness, left CVA tenderness or guarding.    Musculoskeletal:      Lumbar back: She exhibits tenderness (left paraspinal muscles) and spasm (left paraspinal muscles). She exhibits no bony tenderness and no swelling.   Skin:     General: Skin is warm and dry.      Coloration: Skin is not pale.      Findings: Erythema (moist dermatitis in the gluteal cleft) present. No rash.   Neurological:      General: No focal deficit present.      Mental Status: She is alert and oriented to person, place, and time.          Diagnostic Studies   Laboratory:  Results for orders placed or performed in visit on 09/14/20   Urinalysis-UC if Indicated   Result Value Ref Range    Color, UA Yellow Colorless, Yellow, Straw, Light Yellow    Clarity, UA Clear Clear    Glucose, UA Negative Negative    Bilirubin, UA Negative Negative    Ketones, UA Negative Negative    Specific Gravity, UA 1.025 1.005 - 1.030    Blood, UA Negative Negative    pH, UA 6.5 5.0 - 8.0    Protein, UA Negative Negative mg/dL    Urobilinogen, UA 0.2 E.U./dL 0.2 E.U./dL, 1.0 E.U./dL    Nitrite, UA Negative Negative    Leukocytes, UA Negative Negative       Radiology:  N/A    Electrocardiogram:  N/A     Procedure Note   N/A     Pertinent History   The following portions of the patient's history were reviewed and updated as appropriate: allergies, current medications, past family history, past medical history, past social history, past surgical history and problem list.    Patient has Essential hypertension; Family history of malignant neoplasm of gastrointestinal tract; Impaired fasting glucose; Recurrent major depression in remission (H); Adiposity; Obstructive sleep apnea syndrome; Hyperlipidemia; and Varicose veins of both lower extremities with pain on their problem list.    Patient has a past medical history of Depression, Hyperlipidemia, and Hypertension.    Patient has a past surgical history that includes Ectopic pregnancy surgery and Hysterectomy.    Patient's family history includes Alcohol abuse in her  father; Colon cancer in her father; Diabetes in her father; Hypertension in her mother; Kidney disease in her mother.    Patient reports that she has quit smoking. Her smoking use included cigarettes. She has quit using smokeless tobacco. She reports current alcohol use of about 1.0 standard drinks of alcohol per week. She reports that she does not use drugs.     Portions of this note have been dictated using voice recognition software. Any grammatical or contextual distortions are unintentional and inherent to the software.    George Dunn MD  [facility]

## 2021-06-12 NOTE — TELEPHONE ENCOUNTER
Refill Approved    Rx renewed per Medication Renewal Policy. Medication was last renewed on 8/3/20.    Amalia Carlson, [facility] Connection Triage/Med Refill 10/28/2020     Requested Prescriptions   Pending Prescriptions Disp Refills     FLUoxetine (PROZAC) 20 MG capsule [Pharmacy Med Name: FLUOXETIN(P) CAP 20MG] 90 capsule 0     Sig: TAKE 1 CAPSULE DAILY       SSRI Refill Protocol  Passed - 10/26/2020 10:13 AM        Passed - PCP or prescribing provider visit in last year     Last office visit with prescriber/PCP: 12/19/2019 Denice Carranza MD OR same dept: 12/19/2019 Denice Carranza MD OR same specialty: 1/6/2020 Ilsa Jonas MD  Last physical: 8/18/2020 Last MTM visit: Visit date not found   Next visit within 3 mo: Visit date not found  Next physical within 3 mo: Visit date not found  Prescriber OR PCP: Denice Carranza MD  Last diagnosis associated with med order: 1. Recurrent major depression in remission (H)  - FLUoxetine (PROZAC) 20 MG capsule [Pharmacy Med Name: FLUOXETIN(P) CAP 20MG]; TAKE 1 CAPSULE DAILY  Dispense: 90 capsule; Refill: 0  - nortriptyline (PAMELOR) 10 MG capsule [Pharmacy Med Name: NORTRIPTYLIN CAP 10MG]; TAKE 1 CAPSULE AT BEDTIME  Dispense: 90 capsule; Refill: 0    2. Essential hypertension  - lisinopriL (PRINIVIL,ZESTRIL) 10 MG tablet [Pharmacy Med Name: LISINOPRIL TAB 10MG]; TAKE 1 TABLET DAILY  Dispense: 90 tablet; Refill: 0  - triamterene-hydrochlorothiazide (MAXZIDE) 75-50 mg per tablet [Pharmacy Med Name: TRIAMT/HCTZ  TAB 75-50MG]; TAKE 1 TABLET DAILY  Dispense: 90 tablet; Refill: 0    3. Mixed hyperlipidemia  - simvastatin (ZOCOR) 10 MG tablet [Pharmacy Med Name: SIMVASTATIN  TAB 10MG]; TAKE 1 TABLET AT BEDTIME  Dispense: 90 tablet; Refill: 0    If protocol passes may refill for 12 months if within 3 months of last provider visit (or a total of 15 months).                lisinopriL (PRINIVIL,ZESTRIL) 10 MG tablet [Pharmacy Med Name: LISINOPRIL TAB 10MG] 90 tablet 0     Sig: TAKE 1  TABLET DAILY       Ace Inhibitors Refill Protocol Passed - 10/26/2020 10:13 AM        Passed - PCP or prescribing provider visit in past 12 months       Last office visit with prescriber/PCP: 12/19/2019 Denice Carranza MD OR same dept: 12/19/2019 Denice Carranza MD OR same specialty: 1/6/2020 Ilsa Jonas MD  Last physical: 8/18/2020 Last MTM visit: Visit date not found   Next visit within 3 mo: Visit date not found  Next physical within 3 mo: Visit date not found  Prescriber OR PCP: Denice Carranza MD  Last diagnosis associated with med order: 1. Recurrent major depression in remission (H)  - FLUoxetine (PROZAC) 20 MG capsule [Pharmacy Med Name: FLUOXETIN(P) CAP 20MG]; TAKE 1 CAPSULE DAILY  Dispense: 90 capsule; Refill: 0  - nortriptyline (PAMELOR) 10 MG capsule [Pharmacy Med Name: NORTRIPTYLIN CAP 10MG]; TAKE 1 CAPSULE AT BEDTIME  Dispense: 90 capsule; Refill: 0    2. Essential hypertension  - lisinopriL (PRINIVIL,ZESTRIL) 10 MG tablet [Pharmacy Med Name: LISINOPRIL TAB 10MG]; TAKE 1 TABLET DAILY  Dispense: 90 tablet; Refill: 0  - triamterene-hydrochlorothiazide (MAXZIDE) 75-50 mg per tablet [Pharmacy Med Name: TRIAMT/HCTZ  TAB 75-50MG]; TAKE 1 TABLET DAILY  Dispense: 90 tablet; Refill: 0    3. Mixed hyperlipidemia  - simvastatin (ZOCOR) 10 MG tablet [Pharmacy Med Name: SIMVASTATIN  TAB 10MG]; TAKE 1 TABLET AT BEDTIME  Dispense: 90 tablet; Refill: 0    If protocol passes may refill for 12 months if within 3 months of last provider visit (or a total of 15 months).             Passed - Serum Potassium in past 12 months     Lab Results   Component Value Date    Potassium 4.2 08/18/2020             Passed - Blood pressure filed in past 12 months     BP Readings from Last 1 Encounters:   09/14/20 130/77             Passed - Serum Creatinine in past 12 months     Creatinine   Date Value Ref Range Status   08/18/2020 0.70 0.60 - 1.10 mg/dL Final                triamterene-hydrochlorothiazide (MAXZIDE) 75-50 mg per  tablet [Pharmacy Med Name: TRIAMT/HCTZ  TAB 75-50MG] 90 tablet 0     Sig: TAKE 1 TABLET DAILY       Diuretics/Combination Diuretics Refill Protocol  Passed - 10/26/2020 10:13 AM        Passed - Visit with PCP or prescribing provider visit in past 12 months     Last office visit with prescriber/PCP: 12/19/2019 Denice Carranza MD OR same dept: 12/19/2019 Denice Carranza MD OR same specialty: 1/6/2020 Ilas Jonas MD  Last physical: 8/18/2020 Last MTM visit: Visit date not found   Next visit within 3 mo: Visit date not found  Next physical within 3 mo: Visit date not found  Prescriber OR PCP: Denice Carranza MD  Last diagnosis associated with med order: 1. Recurrent major depression in remission (H)  - FLUoxetine (PROZAC) 20 MG capsule [Pharmacy Med Name: FLUOXETIN(P) CAP 20MG]; TAKE 1 CAPSULE DAILY  Dispense: 90 capsule; Refill: 0  - nortriptyline (PAMELOR) 10 MG capsule [Pharmacy Med Name: NORTRIPTYLIN CAP 10MG]; TAKE 1 CAPSULE AT BEDTIME  Dispense: 90 capsule; Refill: 0    2. Essential hypertension  - lisinopriL (PRINIVIL,ZESTRIL) 10 MG tablet [Pharmacy Med Name: LISINOPRIL TAB 10MG]; TAKE 1 TABLET DAILY  Dispense: 90 tablet; Refill: 0  - triamterene-hydrochlorothiazide (MAXZIDE) 75-50 mg per tablet [Pharmacy Med Name: TRIAMT/HCTZ  TAB 75-50MG]; TAKE 1 TABLET DAILY  Dispense: 90 tablet; Refill: 0    3. Mixed hyperlipidemia  - simvastatin (ZOCOR) 10 MG tablet [Pharmacy Med Name: SIMVASTATIN  TAB 10MG]; TAKE 1 TABLET AT BEDTIME  Dispense: 90 tablet; Refill: 0    If protocol passes may refill for 12 months if within 3 months of last provider visit (or a total of 15 months).             Passed - Serum Potassium in past 12 months      Lab Results   Component Value Date    Potassium 4.2 08/18/2020             Passed - Serum Sodium in past 12 months      Lab Results   Component Value Date    Sodium 135 (L) 08/18/2020             Passed - Blood pressure on file in past 12 months     BP Readings from Last 1 Encounters:    09/14/20 130/77             Passed - Serum Creatinine in past 12 months      Creatinine   Date Value Ref Range Status   08/18/2020 0.70 0.60 - 1.10 mg/dL Final                nortriptyline (PAMELOR) 10 MG capsule [Pharmacy Med Name: NORTRIPTYLIN CAP 10MG] 90 capsule 0     Sig: TAKE 1 CAPSULE AT BEDTIME       Tricyclics/Misc Antidepressant/Antianxiety Meds Refill Protocol Passed - 10/26/2020 10:13 AM        Passed - PCP or prescribing provider visit in last year     Last office visit with prescriber/PCP: 12/19/2019 Denice Carranza MD OR same dept: 12/19/2019 Denice Carranza MD OR same specialty: 1/6/2020 Ilsa Jonas MD  Last physical: 8/18/2020 Last MTM visit: Visit date not found   Next visit within 3 mo: Visit date not found  Next physical within 3 mo: Visit date not found  Prescriber OR PCP: Denice Carranza MD  Last diagnosis associated with med order: 1. Recurrent major depression in remission (H)  - FLUoxetine (PROZAC) 20 MG capsule [Pharmacy Med Name: FLUOXETIN(P) CAP 20MG]; TAKE 1 CAPSULE DAILY  Dispense: 90 capsule; Refill: 0  - nortriptyline (PAMELOR) 10 MG capsule [Pharmacy Med Name: NORTRIPTYLIN CAP 10MG]; TAKE 1 CAPSULE AT BEDTIME  Dispense: 90 capsule; Refill: 0    2. Essential hypertension  - lisinopriL (PRINIVIL,ZESTRIL) 10 MG tablet [Pharmacy Med Name: LISINOPRIL TAB 10MG]; TAKE 1 TABLET DAILY  Dispense: 90 tablet; Refill: 0  - triamterene-hydrochlorothiazide (MAXZIDE) 75-50 mg per tablet [Pharmacy Med Name: TRIAMT/HCTZ  TAB 75-50MG]; TAKE 1 TABLET DAILY  Dispense: 90 tablet; Refill: 0    3. Mixed hyperlipidemia  - simvastatin (ZOCOR) 10 MG tablet [Pharmacy Med Name: SIMVASTATIN  TAB 10MG]; TAKE 1 TABLET AT BEDTIME  Dispense: 90 tablet; Refill: 0    If protocol passes may refill for 12 months if within 3 months of last provider visit (or a total of 15 months).                simvastatin (ZOCOR) 10 MG tablet [Pharmacy Med Name: SIMVASTATIN  TAB 10MG] 90 tablet 0     Sig: TAKE 1 TABLET AT BEDTIME        Statins Refill Protocol (Hmg CoA Reductase Inhibitors) Passed - 10/26/2020 10:13 AM        Passed - PCP or prescribing provider visit in past 12 months      Last office visit with prescriber/PCP: 12/19/2019 Denice Carranza MD OR same dept: 12/19/2019 Denice Carranza MD OR same specialty: 1/6/2020 Ilsa Jonas MD  Last physical: 8/18/2020 Last MTM visit: Visit date not found   Next visit within 3 mo: Visit date not found  Next physical within 3 mo: Visit date not found  Prescriber OR PCP: Denice Carranza MD  Last diagnosis associated with med order: 1. Recurrent major depression in remission (H)  - FLUoxetine (PROZAC) 20 MG capsule [Pharmacy Med Name: FLUOXETIN(P) CAP 20MG]; TAKE 1 CAPSULE DAILY  Dispense: 90 capsule; Refill: 0  - nortriptyline (PAMELOR) 10 MG capsule [Pharmacy Med Name: NORTRIPTYLIN CAP 10MG]; TAKE 1 CAPSULE AT BEDTIME  Dispense: 90 capsule; Refill: 0    2. Essential hypertension  - lisinopriL (PRINIVIL,ZESTRIL) 10 MG tablet [Pharmacy Med Name: LISINOPRIL TAB 10MG]; TAKE 1 TABLET DAILY  Dispense: 90 tablet; Refill: 0  - triamterene-hydrochlorothiazide (MAXZIDE) 75-50 mg per tablet [Pharmacy Med Name: TRIAMT/HCTZ  TAB 75-50MG]; TAKE 1 TABLET DAILY  Dispense: 90 tablet; Refill: 0    3. Mixed hyperlipidemia  - simvastatin (ZOCOR) 10 MG tablet [Pharmacy Med Name: SIMVASTATIN  TAB 10MG]; TAKE 1 TABLET AT BEDTIME  Dispense: 90 tablet; Refill: 0    If protocol passes may refill for 12 months if within 3 months of last provider visit (or a total of 15 months).

## 2021-06-15 NOTE — PROGRESS NOTES
Assessment and Plan:     Problem List Items Addressed This Visit     Essential hypertension    Relevant Medications    triamterene-hydrochlorothiazide (MAXZIDE) 75-50 mg per tablet    lisinopril (PRINIVIL,ZESTRIL) 10 MG tablet    Other Relevant Orders    Comprehensive Metabolic Panel    Impaired fasting glucose    Recurrent major depression in remission    Relevant Medications    nortriptyline (PAMELOR) 10 MG capsule    FLUoxetine (PROZAC) 20 MG capsule    Obstructive sleep apnea syndrome    Hyperlipidemia    Relevant Medications    simvastatin (ZOCOR) 20 MG tablet      Other Visit Diagnoses     Screen for colon cancer    -  Primary    Relevant Orders    Ambulatory referral for Colonoscopy    Health maintenance examination        Relevant Orders    Mammo Screening Bilateral    DXA Bone Density Scan    HM2(CBC w/o Differential) (Completed)    Vitamin D, Total (25-Hydroxy)    Thyroid Cascade    Encounter for screening for malignant neoplasm of colon        Relevant Orders    Ambulatory referral for Colonoscopy    Encounter for screening for lipoid disorders        Relevant Orders    Lipid [place], FASTING            The patient's current medical problems were reviewed.    I have had an Advance Directives discussion with the patient.  The following health maintenance schedule was reviewed with the patient and provided in printed form in the after visit summary:   Health Maintenance   Topic Date Due     DEPRESSION FOLLOW UP  1945     ADVANCE DIRECTIVES DISCUSSED WITH PATIENT  10/23/1964     COLONOSCOPY  10/23/1996     TD 18+ HE  10/20/2008     FALL RISK ASSESSMENT  10/23/2011     INFLUENZA VACCINE RULE BASED (1) 08/01/2017     MAMMOGRAM  10/06/2018     DXA SCAN  10/06/2018     PNEUMOCOCCAL POLYSACCHARIDE VACCINE AGE 65 AND OVER  Completed     PNEUMOCOCCAL CONJUGATE VACCINE FOR ADULTS (PCV13 OR PREVNAR)  Completed     ZOSTER VACCINE  Completed        Subjective:   Chief Complaint: Candelaria Hewitt is an 72 y.o.  female here for an Annual Wellness visit.   HPI: Patient is feeling generally well and has no concerns today except she would like a mole checked on her nose and her left breast.  Wondering if she should see dermatology annually for skin check regarding moles.  Patient feeling well with exception that her life has changed quite considerably in the past few months after her  passed away suddenly.  She has been working through this and has a good support system in place.  Mood has been stable.  However, she does find some days more difficult than others and is working through estate and financial planning which has increased her stress level little bit.  Otherwise she is focusing on herself now and trying to get her health in order.  She plan to get this physical today in follow-up for mammogram and colonoscopy in the coming months.  She continues to see her chiropractor regularly related to some arthritis in the back.  Focusing on increasing her exercise and continuing to eat a good diet.  Patient has children who live quite close to her and good family support.  She also is very active in her [employer] which she has found helpful through this difficult time.  Patient is needing some refills on medications, would like to get her lab work updated today.  Previously was seen at an outside clinic but with an insurance change decided to come here.    Review of Systems:   Please see above.  The rest of the review of systems are negative for all systems.    Patient Care Team:  KIMBERLY Littlejohn as PCP - General (Nurse Practitioner)  Papito Valencia OD as Physician (Optometry)     Patient Active Problem List   Diagnosis     Essential hypertension     Family history of malignant neoplasm of gastrointestinal tract     Impaired fasting glucose     Recurrent major depression in remission     Adiposity     Obstructive sleep apnea syndrome     Hyperlipidemia     Past Medical History:   Diagnosis Date     Depression      Hyperlipidemia       Hypertension       Past Surgical History:   Procedure Laterality Date     ECTOPIC PREGNANCY SURGERY       HYSTERECTOMY        Family History   Problem Relation Age of Onset     Hypertension Mother      Kidney disease Mother      Colon cancer Father      Alcohol abuse Father      Diabetes Father       Social History     Social History     Marital status:      Spouse name: N/A     Number of children: N/A     Years of education: N/A     Occupational History     Not on file.     Social History Main Topics     Smoking status: Former Smoker     Types: Cigarettes     Smokeless tobacco: Former User     Alcohol use 0.6 oz/week     1 Glasses of wine per week     Drug use: No     Sexual activity: Not Currently     Partners: Male     Other Topics Concern     Not on file     Social History Narrative      Current Outpatient Prescriptions   Medication Sig Dispense Refill     acetaminophen (TYLENOL) 500 MG tablet 2 tabs in the am and 2 tabs at night       calcium carbonate (OS-HERNANDO) 600 mg calcium (1,500 mg) tablet 2 TABLETS DAILY       FLUoxetine (PROZAC) 20 MG capsule Take 1 capsule (20 mg total) by mouth daily. 90 capsule 3     lisinopril (PRINIVIL,ZESTRIL) 10 MG tablet Take 1 tablet (10 mg total) by mouth daily. 90 tablet 3     MULTIVITAMIN/IRON/FOLIC ACID (COMPLETE WOMEN ORAL) Take by mouth.       naproxen sodium 220 mg cap Take by mouth.       OMEGA-3/DHA/EPA/FISH OIL (FISH OIL-OMEGA-3 FATTY ACIDS) 300-1,000 mg capsule Take 2 g by mouth daily.       triamcinolone (KENALOG) 0.1 % cream Use twice daily to affected area for up to 14 days. 15 g 0     triamterene-hydrochlorothiazide (MAXZIDE) 75-50 mg per tablet Take 1 tablet by mouth daily. 90 tablet 3     glucosamine-chondroitin 500-400 mg cap Take 1 capsule by mouth 3 (three) times a day.       nortriptyline (PAMELOR) 10 MG capsule Take 1 capsule (10 mg total) by mouth at bedtime. 90 capsule 3     simvastatin (ZOCOR) 20 MG tablet Take 1 tablet (20 mg total) by mouth  "at bedtime. 90 tablet 3     UNABLE TO FIND 1 tablet daily. Med Name:       No current facility-administered medications for this visit.       Objective:   Vital Signs:   Visit Vitals     /80 (Patient Site: Left Arm, Patient Position: Sitting, Cuff Size: Adult Regular)     Pulse 78     Temp 98.1  F (36.7  C) (Oral)     Resp 16     Ht 5' 5\" (1.651 m)     Wt 202 lb 1 oz (91.7 kg)     BMI 33.62 kg/m2        VisionScreening:  Vision Screening Comments: Sees Dr. Boby Valencia at [facility]. Last Vision was checked last year and is going to see him this summer for her vision check.        PHYSICAL EXAM   /80 (Patient Site: Left Arm, Patient Position: Sitting, Cuff Size: Adult Regular)  Pulse 78  Temp 98.1  F (36.7  C) (Oral)   Resp 16  Ht 5' 5\" (1.651 m)  Wt 202 lb 1 oz (91.7 kg)  BMI 33.62 kg/m2  General appearance: alert, appears stated age and cooperative  Ears: normal TM's and external ear canals both ears  Nose: Nares normal. Septum midline. Mucosa normal. No drainage or sinus tenderness.  Throat: lips, mucosa, and tongue normal; teeth and gums normal  Neck: no adenopathy, no carotid bruit, no JVD, supple, symmetrical, trachea midline and thyroid not enlarged, symmetric, no tenderness/mass/nodules  Lungs: clear to auscultation bilaterally  Breasts: normal appearance, no masses or tenderness  Heart: regular rate and rhythm, S1, S2 normal, no murmur, click, rub or gallop  Abdomen: soft, non-tender; bowel sounds normal; no masses,  no organomegaly  Extremities: extremities normal, atraumatic, no cyanosis or edema  Pulses: 2+ and symmetric  Skin: Garrett keratosis on the left breast, patient has an abnormal nevus on her nose.  Recommended further referral for dermatology.  Nose has approximately 3 mm round brown nevus with some slight darker discoloration along the right lateral edge  Lymph nodes: Cervical, supraclavicular, and axillary nodes normal.  Neurologic: Grossly normal   Psychologic: " Mood and affect normal.        Assessment Results 2/9/2018   Activities of Daily Living 1 - Full function   Instrumental Activities of Daily Living 1 - Full function   Mini Cog Total Score 5   Some recent data might be hidden     A Mini-Cog score of 0-2 suggests the possibility of dementia, score of 3-5 suggests no dementia    Identified Health Risks:     She is at risk for lack of exercise and has been provided with information to increase physical activity for the benefit of her well-being.  The patient reports that she has difficulty with activities of daily living. I have asked that the patient make a follow up appointment in 12 weeks  if needed where this issue will be further evaluated and addressed.  The patient reports that she has difficulty with instrumental activities of daily living.  She was provided with a list of local organizations that provide support services and advised to make a follow up appointment in 12 weeks to address this further.   Information regarding advance directives (living [place]), including where she can download the appropriate form, was provided to the patient via the AVS.     Denice Pendleton CNP    This note has been dictated using voice recognition software. Any grammatical or context distortions are unintentional and inherent to the software

## 2021-06-16 PROBLEM — I83.813 VARICOSE VEINS OF BOTH LOWER EXTREMITIES WITH PAIN: Status: ACTIVE | Noted: 2018-07-06

## 2021-06-16 PROBLEM — E66.01 MORBID OBESITY (H): Status: ACTIVE | Noted: 2021-06-02

## 2021-06-16 NOTE — PATIENT INSTRUCTIONS - HE

## 2021-06-16 NOTE — PROGRESS NOTES
[facility]  [address] [facility] [address]  Dept: [phone]  Dept Fax: [phone]  Primary Provider: Denice Lopez MD  Pre-op Performing Provider: DENICE LOPEZ      PREOPERATIVE EVALUATION:  Today's date: 4/6/2021    Candelaria Hewitt is a 75 y.o. female who presents for a preoperative evaluation.    Surgical Information:  Surgery/Procedure: Cataract  Surgery Location: [place] [facility]  Surgeon: Dr. Mitchell  Surgery Date: 4/20/21 + 5/4/2021  Time of Surgery: TBD  Where patient plans to recover: At home with family  Fax number for surgical facility: [phone]    Type of Anesthesia Anticipated: Local with MAC    1. Preop general physical exam  Candelaria is approved for bilateral cataract procedure.  She will check with the surgery center to see if she needs outpatient Covid testing.  She can take all her medications as prescribed.    2. Cataract of both eyes, unspecified cataract type      3. Essential hypertension  Under good control with her current medication.    4. Impaired fasting glucose    5. Obstructive sleep apnea syndrome  Uses CPAP.    6. Mixed hyperlipidemia  Stable on Zocor.    7. Recurrent major depression in remission (H)  Under good control with fluoxetine.    She is having some worsening reflux in the evenings and bedtime.  We will have her start Pepcid AC with dinner.    Subjective     HPI related to upcoming procedure: Candelaria presents for preop for cataract procedures bilaterally.  She is feeling well without recent illness or exposure.  She has no known coronary artery disease and denies chest pain.  She has had some mild shortness of breath most likely related to some anxiety.  She has been checking her pulse oximeter and her sats have been at 96%.  Her chronic medical conditions including benign essential hypertension, hyperlipidemia, sleep apnea, and depression are all under good control with her current medication.  She has been  having some worsening reflux and taking Tums at least 3 times per week.  She is unsure if she needs to have a Covid vaccine prior to her procedure but will check with the surgery center.  She has been vaccinated.    Preop Questions 4/6/2021   Have you ever had a heart attack or stroke? No   Have you ever had surgery on your heart or blood vessels, such as a stent placement, a coronary artery bypass, or surgery on an artery in your head, neck, heart, or legs? No   Do you have chest pain with activity? No   Do you have a history of  heart failure? No   Do you currently have a cold, bronchitis or symptoms of other infection? No   Do you have a cough, shortness of breath, or wheezing? YES - likely anxiety   Do you or anyone in your family have previous history of blood clots? No   Do you or does anyone in your family have a serious bleeding problem such as prolonged bleeding following surgeries or cuts? No   Have you ever had problems with anemia or been told to take iron pills? No   Have you had any abnormal blood loss such as black, tarry or bloody stools, or abnormal vaginal bleeding? No   Have you ever had a blood transfusion? No   Are you willing to have a blood transfusion if it is medically needed before, during, or after your surgery? Yes   Have you or any of your relatives ever had problems with anesthesia? No   Do you have sleep apnea, excessive snoring or daytime drowsiness? YES - CPAP   Do you have a CPAP machine? Yes   Do you have any artifical heart valves or other implanted medical devices like a pacemaker, defibrillator, or continuous glucose monitor? No   Do you have artificial joints? No   Are you allergic to latex? No     Health Care Directive:  Patient does not have a Health Care Directive or Living Will: Patient states has Advance Directive and will bring in a copy to clinic.    Preoperative Review of :    reviewed - no record of controlled substances prescribed.        Review of  Systems  CONSTITUTIONAL: NEGATIVE for fever, chills, change in weight  INTEGUMENTARY/SKIN: NEGATIVE for worrisome rashes, moles or lesions  EYES: NEGATIVE for vision changes or irritation  ENT/MOUTH: NEGATIVE for ear, mouth and throat problems  RESP: NEGATIVE for significant cough or SOB  BREAST: NEGATIVE for masses, tenderness or discharge  CV: NEGATIVE for chest pain, palpitations or peripheral edema  GI: POSITIVE for heartburn or reflux and dyspepsia  : NEGATIVE for frequency, dysuria, or hematuria  MUSCULOSKELETAL: NEGATIVE for significant arthralgias or myalgia  NEURO: NEGATIVE for weakness, dizziness or paresthesias  ENDOCRINE: NEGATIVE for temperature intolerance, skin/hair changes  HEME: NEGATIVE for bleeding problems  PSYCHIATRIC: NEGATIVE for changes in mood or affect    Patient Active Problem List    Diagnosis Date Noted     Varicose veins of both lower extremities with pain 07/06/2018     Recurrent major depression in remission (H) 01/08/2015     Obstructive sleep apnea syndrome 07/22/2009     Impaired fasting glucose 12/01/2005     Family history of malignant neoplasm of gastrointestinal tract 12/29/2004     Essential hypertension 06/23/2004     Hyperlipidemia 06/23/2004     Adiposity 04/16/2004     Past Medical History:   Diagnosis Date     Depression      Hyperlipidemia      Hypertension      Past Surgical History:   Procedure Laterality Date     ECTOPIC PREGNANCY SURGERY       HYSTERECTOMY       Current Outpatient Medications   Medication Sig Dispense Refill     acetaminophen (TYLENOL) 500 MG tablet 650 mg. Tylenol Arthritis       calcium carbonate (OS-HERNANDO) 600 mg calcium (1,500 mg) tablet 2 TABLETS DAILY TWICE DAILY       cholecalciferol, vitamin D3, (VITAMIN D3 ORAL) Take 4,000 Units by mouth daily.       FLUoxetine (PROZAC) 20 MG capsule TAKE 1 CAPSULE DAILY 90 capsule 2     glucosamine-chondroitin 500-400 mg cap Take 1 capsule by mouth 2 (two) times a day.              lisinopriL  "(PRINIVIL,ZESTRIL) 10 MG tablet TAKE 1 TABLET DAILY 90 tablet 2     MULTIVITAMIN/IRON/FOLIC ACID (COMPLETE WOMEN ORAL) Take 1 tablet by mouth daily.              naproxen sodium 220 mg cap Take by mouth as needed.              nortriptyline (PAMELOR) 10 MG capsule TAKE 1 CAPSULE AT BEDTIME 90 capsule 2     simvastatin (ZOCOR) 10 MG tablet TAKE 1 TABLET AT BEDTIME 90 tablet 2     triamterene-hydrochlorothiazide (MAXZIDE) 75-50 mg per tablet TAKE 1 TABLET DAILY 90 tablet 2     No current facility-administered medications for this visit.        Allergies   Allergen Reactions     Influenza Vaccine Tri-Sp 09-10      Leg weakness for months, ? GBS     Morphine Nausea Only     Tolerate vicodin and codeine     Methocarbamol Rash       Social History     Tobacco Use     Smoking status: Former Smoker     Types: Cigarettes     Smokeless tobacco: Former User   Substance Use Topics     Alcohol use: Yes     Alcohol/week: 1.0 standard drinks     Types: 1 Glasses of wine per week        Social History     Substance and Sexual Activity   Drug Use No        Objective     /74   Pulse 94   Resp 16   Ht 5' 3.5\" (1.613 m)   Wt 199 lb 12.8 oz (90.6 kg)   LMP  (LMP Unknown)   BMI 34.84 kg/m    Physical Exam    GENERAL APPEARANCE: healthy, alert and no distress     EYES: EOMI, PERRL     HENT: ear canals and TM's normal and nose and mouth without ulcers or lesions     NECK: no adenopathy, no asymmetry, masses, or scars and thyroid normal to palpation     RESP: lungs clear to auscultation - no rales, rhonchi or wheezes     CV: regular rates and rhythm, normal S1 S2, no S3 or S4 and no murmur, click or rub     ABDOMEN:  soft, nontender, no HSM or masses and bowel sounds normal     MS: extremities normal- no gross deformities noted, no evidence of inflammation in joints, FROM in all extremities.     SKIN: no suspicious lesions or rashes     NEURO: Normal strength and tone, sensory exam grossly normal, mentation intact and speech " normal     PSYCH: mentation appears normal. and affect normal/bright     LYMPHATICS: No cervical adenopathy    Recent Labs   Lab Test 08/18/20  1129   HGB 15.3      *   K 4.2   CREATININE 0.70        PRE-OP Diagnostics:   No labs were ordered during this visit.  No EKG required for low risk surgery (cataract, skin procedure, breast biopsy, etc).    REVISED CARDIAC RISK INDEX (RCRI)   The patient has the following serious cardiovascular risks for perioperative complications:   - No serious cardiac risks = 0 points    RCRI INTERPRETATION: 0 points: Class I (very low risk - 0.4% complication rate)         Signed Electronically by: Denice Carranza MD    Copy of this evaluation report is provided to requesting physician.

## 2021-06-17 NOTE — PATIENT INSTRUCTIONS - HE
Patient Instructions by Denice Pendleton FNP at 3/29/2019  9:00 AM     Author: Denice Pendleton FNP Service: -- Author Type: Nurse Practitioner    Filed: 3/29/2019 10:01 AM Encounter Date: 3/29/2019 Status: Addendum    : Denice Pendleton FNP (Nurse Practitioner)    Related Notes: Original Note by Denice Pendleton FNP (Nurse Practitioner) filed at 3/29/2019  9:54 AM         Patient Education     Exercise for a Healthier Heart  You may wonder how you can improve the health of your heart. If youre thinking about exercise, youre on the right track. You dont need to become an athlete, but you do need a certain amount of brisk exercise to help strengthen your heart. If you have been diagnosed with a heart condition, your doctor may recommend exercise to help stabilize your condition. To help make exercise a habit, choose safe, fun activities.       Be sure to check with your health care provider before starting an exercise program.    Why exercise?  Exercising regularly offers many healthy rewards. It can help you do all of the following:    Improve your blood cholesterol levels to help prevent further heart trouble    Lower your blood pressure to help prevent a stroke or heart attack    Control diabetes, or reduce your risk of getting this disease    Improve your heart and lung function    Reach and maintain a healthy weight    Make your muscles stronger and more limber so you can stay active    Prevent falls and fractures by slowing the loss of bone mass (osteoporosis)    Manage stress better  Exercise tips  Ease into your routine. Set small goals. Then build on them.  Exercise on most days. Aim for a total of 150 or more minutes of moderate to  vigorous intensity activity each week. Consider 40 minutes, 3 to 4 times a week. For best results, activity should last for 40 minutes on average. It is OK to work up to the 40 minute period over time. Examples of moderate-intensity activity is walking one mile in 15 minutes or 30 to 45 minutes  of yard work.  Step up your daily activity level. Along with your exercise program, try being more active throughout the day. Walk instead of drive. Do more household tasks or yard work.  Choose one or more activities you enjoy. Walking is one of the easiest things you can do. You can also try swimming, riding a bike, or taking an exercise class.  Stop exercising and call your doctor if you:    Have chest pain or feel dizzy or lightheaded    Feel burning, tightness, pressure, or heaviness in your chest, neck, shoulders, back, or arms    Have unusual shortness of breath    Have increased joint or muscle pain    Have palpitations or an irregular heartbeat      8772-7615 [employer]. [address] [place] [address], [place] [address]. All rights reserved. This information is not intended as a substitute for professional medical care. Always follow your healthcare professional's instructions.         Patient Education   Urinary Incontinence, Female (Adult)  Urinary incontinence means loss of control of the bladder. This problem affects many women, especially as they get older. If you have incontinence, you may be embarrassed to ask for help. But know that this problem can be treated.  Types of Incontinence  There are different types of incontinence. Two of the main types are described here. You can have more than one type.    Stress incontinence. With this type, urine leaks when pressure (stress) is put on the bladder. This may happen when you cough, sneeze, or laugh. Stress incontinence most often occurs because the pelvic floor muscles that support the bladder and urethra are weak. This can happen after pregnancy and vaginal childbirth or a hysterectomy. It can also be due to excess body weight or hormone changes.    Urge incontinence (also called overactive bladder). With this type, a sudden urge to urinate is felt often. This may happen even though there may not be much urine in the bladder. The need to urinate  often during the night is common. Urge incontinence most often occurs because of bladder spasms. This may be due to bladder irritation or infection. Damage to bladder nerves or pelvic muscles, constipation, and certain medicines can also lead to urge incontinence.  Treatment of urinary incontinence depends on the cause. Further evaluation is needed to find the type you have. This will likely include an exam and certain tests. Based on the results, you and your healthcare provider can then plan treatment. Until a diagnosis is made, the home care tips below can help relieve symptoms.  Home care    Do pelvic floor muscle exercises, if they are prescribed. The pelvic floor muscles help support the bladder and urethra. Many women find that their symptoms improve when doing special exercises that strengthen these muscles. To do the exercises contract the muscles you would use to stop your stream of urine, but do this when youre not urinating. Hold for 10 seconds, then relax. Repeat 10 to 20 times in a row, at least 3 times a day. Your provider may give you other instructions for how to do the exercises and how often.    Keep a bladder diary. This helps track how often and how much you urinate over a set period of time. Bring this diary with you to your next visit with the provider. The information can help your provider learn more about your bladder problem.    Lose weight, if advised to by your provider. Excess weight puts pressure on the bladder. Your provider can help you create a weight-loss plan thats right for you. This may include exercising more and making certain diet changes.    Don't consume foods and drinks that may irritate the bladder. These can include alcohol and caffeinated drinks.    Quit smoking. Smoking and other tobacco use can lead to chronic cough that strains the pelvic floor muscles. Smoking may also damage the bladder and urethra. Talk with your provider about treatments or methods you can use to  quit smoking.    If drinking large amounts of fluid causes you to have symptoms, you may be advised to limit your fluid intake. You may also be advised to drink most of your fluids during the day and to limit fluids at night.    If youre worried about urine leakage or accidents, you may wear absorbent pads to catch urine. Change the pads often. This helps reduce discomfort. It may also reduce the risk of skin or bladder infections.  Follow-up care  Follow up with your healthcare provider, or as directed. It may take some to find the right treatment for your problem. Your treatment plan may include special therapies or medicines. Certain procedures or surgery may also be options. Be sure to discuss any questions you have with your provider.  When to seek medical advice  Call the healthcare provider right away if any of these occur:    Fever of 100.4 F (38 C) or higher, or as directed by your provider    Bladder pain or fullness    Abdominal swelling    Nausea or vomiting    Back pain    Weakness, dizziness or fainting  Date Last Reviewed: 10/1/2017    8073-1788 [employer]. [address] [place] [address]. All rights reserved. This information is not intended as a substitute for professional medical care. Always follow your healthcare professional's instructions.     Patient Education   Preventing Falls in the Home  As you get older, falls are more likely. Thats because your reaction time slows. Your muscles and joints may also get stiffer, making them less flexible. Illness, medications, and vision changes can also affect your balance. A fall could leave you unable to live on your own. To make your home safer, follow these tips:    Floors    Put nonskid pads under area rugs.    Remove throw rugs.    Replace worn floor coverings.    Tack carpets firmly to each step on carpeted stairs. Put nonskid strips on the edges of uncarpeted stairs.    Keep floors and stairs free of clutter and  cords.    Arrange furniture so there are clear pathways.    Clean up any spills right away.    Bathrooms    Install grab bars in the tub or shower.    Apply nonskid strips or put a nonskid rubber mat in the tub or shower.    Sit on a bath chair to bathe.    Use bathmats with nonskid backing.    Lighting    Keep a flashlight in each room.    Put a nightlight along the pathway between the bedroom and the bathroom.    7725-8468 [employer]. [address] [place] [address]. All rights reserved. This information is not intended as a substitute for professional medical care. Always follow your healthcare professional's instructions.           Advance Directive  Patients advance directive was discussed and I am comfortable with the patients wishes.  Patient Education   Personalized Prevention Plan  You are due for the preventive services outlined below.  Your care team is available to assist you in scheduling these services.  If you have already completed any of these items, please share that information with your care team to update in your medical record.  Health Maintenance   Topic Date Due   ? ZOSTER VACCINES (2 of 3) 01/02/2015   ? INFLUENZA VACCINE RULE BASED (1) 08/01/2018   ? DEPRESSION FOLLOW UP  08/09/2018   ? DXA SCAN  10/06/2018   ? FALL RISK ASSESSMENT  02/09/2019   ? MAMMOGRAM  02/27/2020   ? ADVANCE DIRECTIVES DISCUSSED WITH PATIENT  02/09/2023   ? TD 18+ HE  02/09/2028   ? COLONOSCOPY  04/23/2028   ? PNEUMOCOCCAL POLYSACCHARIDE VACCINE AGE 65 AND OVER  Completed   ? PNEUMOCOCCAL CONJUGATE VACCINE FOR ADULTS (PCV13 OR PREVNAR)  Completed

## 2021-06-18 NOTE — PATIENT INSTRUCTIONS - HE
Patient Instructions by George Dunn MD at 9/14/2020  4:20 PM     Author: George Dunn MD Service: -- Author Type: Physician    Filed: 9/14/2020  5:10 PM Encounter Date: 9/14/2020 Status: Addendum    : George Dunn MD (Physician)    Related Notes: Original Note by George Dunn MD (Physician) filed at 9/14/2020  5:10 PM       -Skin irritation in the gluteal cleft is consistent with a fungal skin infection.  -Keep this area clean and dry.  -Apply antifungal and steroid creams as directed.  -Urinalysis shows no signs of bladder infection.  -Low back pain is consistent with muscle spasm. Recommend application of heat and continued stretching.  Patient Education     Candida Skin Infection (Adult)  Candida is type of yeast. It grows naturally on the skin and in the mouth. If it grows out of control, it can cause an infection. Candida can cause infections in the genital area, skin folds, in the mouth, and under the breasts. Anyone can get this infection. It is more common in a person with a weak immune system, such as from diabetes, HIV, or cancer. Its also more common in someone who has been on antibiotic therapy. And its more common people who are overweight or who have incontinence. Wearing tight-fitting clothing and taking part in activities with lots of skin-to-skin contact can also put you at risk.  Candida causes the skin to become bright red and inflamed. The border of the infected part of the skin is often raised. The infection causes pain and itching. Sometimes the skin peels and bleeds. In the mouth, candida is called thrush, and may cause white thickened areas.  A Candida rash is most often treated with an antifungal cream or ointment. The rash will clear a few days after starting the medicine. Infections that dont go away may need a prescription medicine. In rare cases, a bacterial infection can also occur.  Home care  Your healthcare provider will recommend an antifungal  cream or ointment for the rash. He or she may also prescribe a medicine for the itch. Follow all instructions for using these medicines. Dont use cornstarch powder. Cornstarch can cause the Candida infection to get worse.  General care:    Keep your skin clean by washing the area twice a day.    Use the cream as directed until your rash is gone. Once the skin has healed, keep it dry to prevent another infection.     If you are overweight, talk with your healthcare provider about a plan to lose excess weight.    Avoid clothes that fit tightly.  Follow-up care  Follow up with your healthcare provider, or as advised. Your rash will clear in 7 to 14 days. Call your healthcare provider if the rash is not gone after 14 days.  When to seek medical advice  Call your healthcare provider right away if any of these occur:    Pain or redness that gets worse or spreads    Fluid coming from the skin    Yellow crusts on the skin    Fever of 100.4 F (38 C) or higher, or as directed by your healthcare provider  Date Last Reviewed: 9/1/2016 2000-2017 [employer]. [address] [place] [address], [place] [address]. All rights reserved. This information is not intended as a substitute for professional medical care. Always follow your healthcare professional's instructions.

## 2021-06-18 NOTE — PATIENT INSTRUCTIONS - HE
Patient Instructions by Denice Carranza MD at 8/18/2020 10:40 AM     Author: Denice Carranza MD Service: -- Author Type: Physician    Filed: 8/18/2020 11:21 AM Encounter Date: 8/18/2020 Status: Signed    : Denice Carranza MD (Physician)         Patient Education     Exercise for a Healthier Heart  You may wonder how you can improve the health of your heart. If youre thinking about exercise, youre on the right track. You dont need to become an athlete, but you do need a certain amount of brisk exercise to help strengthen your heart. If you have been diagnosed with a heart condition, your doctor may recommend exercise to help stabilize your condition. To help make exercise a habit, choose safe, fun activities.       Be sure to check with your health care provider before starting an exercise program.    Why exercise?  Exercising regularly offers many healthy rewards. It can help you do all of the following:    Improve your blood cholesterol levels to help prevent further heart trouble    Lower your blood pressure to help prevent a stroke or heart attack    Control diabetes, or reduce your risk of getting this disease    Improve your heart and lung function    Reach and maintain a healthy weight    Make your muscles stronger and more limber so you can stay active    Prevent falls and fractures by slowing the loss of bone mass (osteoporosis)    Manage stress better  Exercise tips  Ease into your routine. Set small goals. Then build on them.  Exercise on most days. Aim for a total of 150 or more minutes of moderate to  vigorous intensity activity each week. Consider 40 minutes, 3 to 4 times a week. For best results, activity should last for 40 minutes on average. It is OK to work up to the 40 minute period over time. Examples of moderate-intensity activity is walking one mile in 15 minutes or 30 to 45 minutes of yard work.  Step up your daily activity level. Along with your exercise program, try being more active  throughout the day. Walk instead of drive. Do more household tasks or yard work.  Choose one or more activities you enjoy. Walking is one of the easiest things you can do. You can also try swimming, riding a bike, or taking an exercise class.  Stop exercising and call your doctor if you:    Have chest pain or feel dizzy or lightheaded    Feel burning, tightness, pressure, or heaviness in your chest, neck, shoulders, back, or arms    Have unusual shortness of breath    Have increased joint or muscle pain    Have palpitations or an irregular heartbeat      3848-1511 [employer]. [address] [place] [address]356. All rights reserved. This information is not intended as a substitute for professional medical care. Always follow your healthcare professional's instructions.         Patient Education   Instrumental Activities of Daily Living  Your Health Risk Assessment indicates you have difficulties with instrumental activities of daily living which include laundry, housekeeping, banking, shopping, using the telephone, food preparation, transportation, or taking your own medications. Please make a follow up appointment for us to address this issue in more detail.    [employer] has resources available on the following website: [email or web address].org/caregivers.html     Also, here is a local agency that provides help with meals and other assistance:   [facility] Line: 977.691.6754     Patient Education   Signs of Hearing Loss  Hearing loss is a problem shared by many people. In fact, it is one of the most common health conditions, particularly as people age. Most people over age 65 have some hearing loss, and by age 80, almost everyone does. Because hearing loss usually occurs slowly over the years, you may not realize your hearing ability has gotten worse.       Have your hearing checked  Contact your [facility] care provider if you:    Have to strain to hear normal conversation.    Have to watch  other peoples faces very carefully to follow what theyre saying.    Need to ask people to repeat what theyve said.    Often misunderstand what people are saying.    Turn the volume of the television or radio up so high that others complain.    Feel that people are mumbling when theyre talking to you.    Find that the effort to hear leaves you feeling tired and irritated.    Notice, when using the phone, that you hear better with 1 ear than the other.    [id number] [employer]. [address] [place] [address] 25423. All rights reserved. This information is not intended as a substitute for professional medical care. Always follow your healthcare professional's instructions.         Patient Education   Urinary Incontinence, Female (Adult)  Urinary incontinence means loss of control of the bladder. This problem affects many women, especially as they get older. If you have incontinence, you may be embarrassed to ask for help. But know that this problem can be treated.  Types of Incontinence  There are different types of incontinence. Two of the main types are described here. You can have more than one type.    Stress incontinence. With this type, urine leaks when pressure (stress) is put on the bladder. This may happen when you cough, sneeze, or laugh. Stress incontinence most often occurs because the pelvic floor muscles that support the bladder and urethra are weak. This can happen after pregnancy and vaginal childbirth or a hysterectomy. It can also be due to excess body weight or hormone changes.    Urge incontinence (also called overactive bladder). With this type, a sudden urge to urinate is felt often. This may happen even though there may not be much urine in the bladder. The need to urinate often during the night is common. Urge incontinence most often occurs because of bladder spasms. This may be due to bladder irritation or infection. Damage to bladder nerves or pelvic muscles, constipation,  and certain medicines can also lead to urge incontinence.  Treatment of urinary incontinence depends on the cause. Further evaluation is needed to find the type you have. This will likely include an exam and certain tests. Based on the results, you and your healthcare provider can then plan treatment. Until a diagnosis is made, the home care tips below can help relieve symptoms.  Home care    Do pelvic floor muscle exercises, if they are prescribed. The pelvic floor muscles help support the bladder and urethra. Many women find that their symptoms improve when doing special exercises that strengthen these muscles. To do the exercises contract the muscles you would use to stop your stream of urine, but do this when youre not urinating. Hold for 10 seconds, then relax. Repeat 10 to 20 times in a row, at least 3 times a day. Your provider may give you other instructions for how to do the exercises and how often.    Keep a bladder diary. This helps track how often and how much you urinate over a set period of time. Bring this diary with you to your next visit with the provider. The information can help your provider learn more about your bladder problem.    Lose weight, if advised to by your provider. Excess weight puts pressure on the bladder. Your provider can help you create a weight-loss plan thats right for you. This may include exercising more and making certain diet changes.    Don't consume foods and drinks that may irritate the bladder. These can include alcohol and caffeinated drinks.    Quit smoking. Smoking and other tobacco use can lead to chronic cough that strains the pelvic floor muscles. Smoking may also damage the bladder and urethra. Talk with your provider about treatments or methods you can use to quit smoking.    If drinking large amounts of fluid causes you to have symptoms, you may be advised to limit your fluid intake. You may also be advised to drink most of your fluids during the day and to  limit fluids at night.    If youre worried about urine leakage or accidents, you may wear absorbent pads to catch urine. Change the pads often. This helps reduce discomfort. It may also reduce the risk of skin or bladder infections.  Follow-up care  Follow up with your healthcare provider, or as directed. It may take some to find the right treatment for your problem. Your treatment plan may include special therapies or medicines. Certain procedures or surgery may also be options. Be sure to discuss any questions you have with your provider.  When to seek medical advice  Call the healthcare provider right away if any of these occur:    Fever of 100.4 F (38 C) or higher, or as directed by your provider    Bladder pain or fullness    Abdominal swelling    Nausea or vomiting    Back pain    Weakness, dizziness or fainting  Date Last Reviewed: 10/1/2017    7293-0405 [employer]. [address] [place] [address]52. All rights reserved. This information is not intended as a substitute for professional medical care. Always follow your healthcare professional's instructions.       Advance Directive  Patients advance directive was discussed and I am comfortable with the patients wishes.  Patient Education   Personalized Prevention Plan  You are due for the preventive services outlined below.  Your care team is available to assist you in scheduling these services.  If you have already completed any of these items, please share that information with your care team to update in your medical record.  Health Maintenance   Topic Date Due   ? DEPRESSION ACTION PLAN  1945   ? HEPATITIS C SCREENING  1945   ? MAMMOGRAM  02/27/2020   ? MEDICARE ANNUAL WELLNESS VISIT  03/29/2020   ? FALL RISK ASSESSMENT  03/29/2020   ? INFLUENZA VACCINE RULE BASED (1) 08/01/2020   ? DXA SCAN  04/25/2021   ? LIPID  03/29/2024   ? ADVANCE CARE PLANNING  03/29/2024   ? TD 18+ HE  02/09/2028   ? COLORECTAL CANCER SCREENING   04/23/2028   ? PNEUMOCOCCAL IMMUNIZATION 65+ LOW/MEDIUM RISK  Completed   ? ZOSTER VACCINES  Completed   ? HEPATITIS B VACCINES  Aged Out

## 2021-06-20 ENCOUNTER — HEALTH MAINTENANCE LETTER (OUTPATIENT)
Age: 76
End: 2021-06-20

## 2021-06-20 NOTE — LETTER
Letter by Denice Carranza MD at      Author: Denice Carranza MD Service: -- Author Type: --    Filed:  Encounter Date: 8/21/2020 Status: (Other)         Candelaria Hewitt  [address] [place] [address]             August 21, 2020         Dear Ms. Hewitt,    Below are the results from your recent visit:    Resulted Orders   Comprehensive Metabolic Panel   Result Value Ref Range    Sodium 135 (L) 136 - 145 mmol/L    Potassium 4.2 3.5 - 5.0 mmol/L    Chloride 100 98 - 107 mmol/L    CO2 23 22 - 31 mmol/L    Anion Gap, Calculation 12 5 - 18 mmol/L    Glucose 99 70 - 125 mg/dL    BUN 17 8 - 28 mg/dL    Creatinine 0.70 0.60 - 1.10 mg/dL    GFR MDRD Af Amer >60 >60 mL/min/1.73m2    GFR MDRD Non Af Amer >60 >60 mL/min/1.73m2    Bilirubin, Total 0.6 0.0 - 1.0 mg/dL    Calcium 9.5 8.5 - 10.5 mg/dL    Protein, Total 7.2 6.0 - 8.0 g/dL    Albumin 4.3 3.5 - 5.0 g/dL    Alkaline Phosphatase 95 45 - 120 U/L    AST 31 0 - 40 U/L    ALT 39 0 - 45 U/L    Narrative    Fasting Glucose reference range is 70-99 mg/dL per  American Diabetes Association (ADA) guidelines.   HM2(CBC w/o Differential)   Result Value Ref Range    WBC 5.6 4.0 - 11.0 thou/uL    RBC 4.99 3.80 - 5.40 mill/uL    Hemoglobin 15.3 12.0 - 16.0 g/dL    Hematocrit 46.1 35.0 - 47.0 %    MCV 92 80 - 100 fL    MCH 30.6 27.0 - 34.0 pg    MCHC 33.1 32.0 - 36.0 g/dL    RDW 12.0 11.0 - 14.5 %    Platelets 226 140 - 440 thou/uL    MPV 7.3 7.0 - 10.0 fL   Lipid Cascade   Result Value Ref Range    Cholesterol 200 (H) <=199 mg/dL    Triglycerides 205 (H) <=149 mg/dL    HDL Cholesterol 53 >=50 mg/dL    LDL Calculated 106 <=129 mg/dL    Patient Fasting > 8hrs? Unknown    Glycosylated Hemoglobin A1c   Result Value Ref Range    Hemoglobin A1c 5.9 (H) <=5.6 %      Comment:      Prediabetes:   HBA1c       5.7 to 6.4%        Diabetes:        HBA1c        >=6.5%   Patients with Hgb F >5%, total bilirubin >10.0 mg/dL, abnormal red cell turnover, severe renal or hepatic disease or  "malignancy should not have this A1C method used to diagnose or monitor diabetes.       COVID-19 Virus (Coronavirus) Antibody & Titer Reflex   Result Value Ref Range    COVID-19 Antibody Screen Negative       Comment:      No COVID-19 antibodies detected.  Patients within 10 days of symptom onset for  COVID-19 may not produce sufficient levels of detectable antibodies.  Immunocompromised COVID-19 patients may take longer to develop antibodies.    COVID-19 IgG Titer Not Applicable       Comment:      Qualitative screen for total antibodies to COVID-19 (SARS-CoV-2) with  semi-quantitative measurement of IgG COVID-19 antibodies by endpoint titer.  COVID-19 antibodies may be elevated due to a past or current infection.  Negative results do not rule out COVID-19 infection.  Results from antibody  testing should not be used as the sole basis to diagnose or exclude SARS-CoV-2  infection or to inform infection status.  COVID-19 PCR test should be ordered  if current infection is suspected.  False positive results may occur in rare  cases due to cross-reacting antibodies.  This test was developed and its performance characteristics determined by the  [facility] ([facility]),  which is regulated under CLIA as qualified to perform high-complexity testing.  This test has not been reviewed by the FDA.  Testing performed by [facility], [facility], [address], [address], [place]   [address]       Your covid antibodies were negative.  Your A1C is now in the \"prediabetes\" range so I would recommend working on healthy diet, exercise, and weight loss.  I recommend that we recheck again next year.    Please call with questions or contact us using [employer].    Sincerely,        Electronically signed by Denice Carranza MD       "

## 2021-06-21 ENCOUNTER — COMMUNICATION - HEALTHEAST (OUTPATIENT)
Dept: FAMILY MEDICINE | Facility: CLINIC | Age: 76
End: 2021-06-21

## 2021-06-21 DIAGNOSIS — I10 ESSENTIAL HYPERTENSION: ICD-10-CM

## 2021-06-21 DIAGNOSIS — E78.2 MIXED HYPERLIPIDEMIA: ICD-10-CM

## 2021-06-21 DIAGNOSIS — F33.40 RECURRENT MAJOR DEPRESSION IN REMISSION (H): ICD-10-CM

## 2021-06-21 RX ORDER — LISINOPRIL 10 MG/1
TABLET ORAL
Qty: 90 TABLET | Refills: 0 | Status: SHIPPED | OUTPATIENT
Start: 2021-06-21 | End: 2021-08-27

## 2021-06-21 RX ORDER — TRIAMTERENE AND HYDROCHLOROTHIAZIDE 75; 50 MG/1; MG/1
TABLET ORAL
Qty: 90 TABLET | Refills: 0 | Status: SHIPPED | OUTPATIENT
Start: 2021-06-21 | End: 2021-08-27

## 2021-06-21 RX ORDER — SIMVASTATIN 10 MG
TABLET ORAL
Qty: 90 TABLET | Refills: 3 | Status: SHIPPED | OUTPATIENT
Start: 2021-06-21 | End: 2021-08-27

## 2021-06-22 NOTE — PROGRESS NOTES
CHIEF COMPLAINT:  Chief Complaint   Patient presents with     Urinary Tract Infection     possible UTI. urgency and frequency. small amount of blood. pinkish color.      HPI:  Candelaria is an 73 y.o. female who presents for evaluation of dysuria. She has had 4 days of symptoms. She finished 10 days of antibiotics last month for a upper respitory illness and developed diarrhea that resolved and then recently had diarrhea again in the last week. She has a history of IBS.   She has no history of chronic UTI, pyelonephritis or kidney stones.  Review of Systems:  ROS: Patient denies fever, sweats, fainting, weight change, dizziness, sleep problems, chest pain, palpitations, shortness of breath, wheezing, cough,  sore throat, changes in hearing, ear pain,tinnitus,  disphagia, sore throat, globus, changes in vision, eye pain eye redness, acid reflux, nausea, vomiting, diarrhea, constipation, black or bloody stools,  Dysuria, frequency, urinary incontinence, nocturia, hematuria, back pain,joint pain, bone pain, muscle cramps,edema, weakness, numbness, tingling of extremities, rash, itching, skin changes, swollen lymph nodes, thirst, increased urination, breast lumps, breast pain, nipple discharge, memory difficulties, anxiety, mood swings, (female)vaginal discharge, dyspareunia, menorrhagia, pelvic pain, sexual dysfunction,       PREVIOUS MEDICAL HISTORY  Past Medical History:   Diagnosis Date     Depression      Hyperlipidemia      Hypertension      PREVIOUS SURGICAL HISTORY  Past Surgical History:   Procedure Laterality Date     ECTOPIC PREGNANCY SURGERY       HYSTERECTOMY       CURRENT MEDICATIONS  Current Outpatient Medications   Medication Sig     calcium carbonate (OS-HERNANDO) 600 mg calcium (1,500 mg) tablet 2 TABLETS DAILY     conjugated estrogens (PREMARIN) vaginal cream Insert into the vagina.     FLUoxetine (PROZAC) 20 MG capsule Take 1 capsule (20 mg total) by mouth daily.     glucosamine bishop 2KCl-chondroit 500-400 mg  tablet Take by mouth.     multivitamin therapeutic w/minerals (THERAPEUTIC-M) 27-0.4 mg Tab tablet      MULTIVITAMIN/IRON/FOLIC ACID (COMPLETE WOMEN ORAL) Take by mouth.     naproxen sodium 220 mg cap Take by mouth.     simvastatin (ZOCOR) 10 MG tablet Take 1 tablet (10 mg total) by mouth at bedtime.     triamterene-hydrochlorothiazide (MAXZIDE) 75-50 mg per tablet Take 1 tablet by mouth daily.     UNABLE TO FIND 1 tablet daily Natures bounty.           acetaminophen (TYLENOL) 500 MG tablet 2 tabs in the am and 2 tabs at night     glucosamine-chondroitin 500-400 mg cap Take 1 capsule by mouth 3 (three) times a day.     lisinopril (PRINIVIL,ZESTRIL) 10 MG tablet Take 1 tablet (10 mg total) by mouth daily.     nortriptyline (PAMELOR) 10 MG capsule Take 1 capsule (10 mg total) by mouth at bedtime.     omeprazole (PRILOSEC) 40 MG capsule Take 40 mg by mouth.     triamcinolone (KENALOG) 0.1 % cream Use twice daily to affected area for up to 14 days.     ALLERGIES  Haemophilus influenzae; Morphine; and Methocarbamol  PROBLEM LIST  Patient Active Problem List   Diagnosis     Essential hypertension     Family history of malignant neoplasm of gastrointestinal tract     Impaired fasting glucose     Recurrent major depression in remission (H)     Adiposity     Obstructive sleep apnea syndrome     Hyperlipidemia     SOCIAL HISTORY  Social History     Socioeconomic History     Marital status:      Spouse name: Not on file     Number of children: Not on file     Years of education: Not on file     Highest education level: Not on file   Social Needs     Financial resource strain: Not on file     Food insecurity - worry: Not on file     Food insecurity - inability: Not on file     Transportation needs - medical: Not on file     Transportation needs - non-medical: Not on file   Occupational History     Not on file   Tobacco Use     Smoking status: Former Smoker     Types: Cigarettes     Smokeless tobacco: Former User    Substance and Sexual Activity     Alcohol use: Yes     Alcohol/week: 0.6 oz     Types: 1 Glasses of wine per week     Drug use: No     Sexual activity: Not Currently     Partners: Male   Other Topics Concern     Not on file   Social History Narrative     Not on file       OBJECTIVE:  /84 (Patient Site: Right Arm, Patient Position: Sitting, Cuff Size: Adult Regular)   Pulse 90   Wt 203 lb (92.1 kg)   SpO2 94%   BMI 33.78 kg/m      General: Appears well, in no apparent distress.   Mouth: mucous membranes moist  Neck: Soft, no adenopathy  Heart: Regular rate and rhythm, no murmurs, clicks or rubs.   Lungs:  Clear to auscultation.  Abdomen: abdomen is soft with mild diffuse tenderness, no guarding, mass, rebound or organomegaly.   Back: non-tender, no CVA tenderness bilaterally.    WORKUP:  No visits with results within 1 Month(s) from this visit.   Latest known visit with results is:   Physical on 02/09/2018   Component Date Value Ref Range Status     Sodium 02/09/2018 138  136 - 145 mmol/L Final     Potassium 02/09/2018 4.3  3.5 - 5.0 mmol/L Final     Chloride 02/09/2018 101  98 - 107 mmol/L Final     CO2 02/09/2018 24  22 - 31 mmol/L Final     Anion Gap, Calculation 02/09/2018 13  5 - 18 mmol/L Final     Glucose 02/09/2018 109  70 - 125 mg/dL Final     BUN 02/09/2018 14  8 - 28 mg/dL Final     Creatinine 02/09/2018 0.71  0.60 - 1.10 mg/dL Final     GFR MDRD Af Amer 02/09/2018 >60  >60 mL/min/1.73m2 Final     GFR MDRD Non Af Amer 02/09/2018 >60  >60 mL/min/1.73m2 Final     Bilirubin, Total 02/09/2018 0.7  0.0 - 1.0 mg/dL Final     Calcium 02/09/2018 9.6  8.5 - 10.5 mg/dL Final     Protein, Total 02/09/2018 7.3  6.0 - 8.0 g/dL Final     Albumin 02/09/2018 4.1  3.5 - 5.0 g/dL Final     Alkaline Phosphatase 02/09/2018 85  45 - 120 U/L Final     AST 02/09/2018 43* 0 - 40 U/L Final     ALT 02/09/2018 55* 0 - 45 U/L Final     WBC 02/09/2018 5.2  4.0 - 11.0 thou/uL Final     RBC 02/09/2018 5.12  3.80 - 5.40  mill/uL Final     Hemoglobin 02/09/2018 15.1  12.0 - 16.0 g/dL Final     Hematocrit 02/09/2018 46.0  35.0 - 47.0 % Final     MCV 02/09/2018 90  80 - 100 fL Final     MCH 02/09/2018 29.4  27.0 - 34.0 pg Final     MCHC 02/09/2018 32.7  32.0 - 36.0 g/dL Final     RDW 02/09/2018 12.5  11.0 - 14.5 % Final     Platelets 02/09/2018 234  140 - 440 thou/uL Final     MPV 02/09/2018 7.2  7.0 - 10.0 fL Final     Vitamin D, Total (25-Hydroxy) 02/09/2018 26.6* 30.0 - 80.0 ng/mL Final     TSH 02/09/2018 1.88  0.30 - 5.00 uIU/mL Final     Cholesterol 02/09/2018 190  <=199 mg/dL Final     Triglycerides 02/09/2018 169* <=149 mg/dL Final     HDL Cholesterol 02/09/2018 58  >=50 mg/dL Final     LDL Calculated 02/09/2018 98  <=129 mg/dL Final     Patient Fasting > 8hrs? 02/09/2018 Yes   Final     None   ASSESSMENT/PLAN:  1. Dysuria  Urinalysis-UC if Indicated    sulfamethoxazole-trimethoprim (BACTRIM DS) 800-160 mg per tablet    Culture, Urine   Push fluids, Azo for bladder pain, follow up if worsening symptoms.      Recheck as needed for persistence, worsening, appearance of new symptoms with Denice Pendleton, DAVIDP    Mary Dutta 12/10/2018 4:11 PM        --   Mary Dutta, MS, PA-C

## 2021-06-22 NOTE — PROGRESS NOTES
ASSESSMENT:  1. History of shingles     2. History of ear infection           PLAN:  Continue to monitor for improvement.  Ear infection appears to be improving, finish a course of antibiotics as directed.  Discussed symptomatic cares for shingles, new shingles vaccine and patient can get this when she desires, encouraged her to check with insurance.  No problem-specific Assessment & Plan notes found for this encounter.    The following high BMI interventions were performed this visit: encouragement to exercise and weight monitoring  Patient Instructions   Sublingual B12 500-1000 mcg daily, can try and we'll check levels in Feb.    D3 4321-1109 IU daily.          No orders of the defined types were placed in this encounter.    There are no discontinued medications.    No Follow-up on file.    CHIEF COMPLAINT:  Chief Complaint   Patient presents with     Follow-up     pt was seen at the  for shingles and ear infections        HISTORY OF PRESENT ILLNESS:  Candelaria is a 73 y.o. female here today for follow-up on shingles and a right middle ear infection.  Patient was seen in the emergency department on 11/21/2018, diagnosed and treated with a course ofAmoxicillin and valacyclovir for shingles.  They had recommended that she come in for a recheck this week.  Patient overall is feeling better, viral symptoms of tiredness have resolved.  She feels that her ear infection is improved, she still some fullness but no more pain present.  She has been taking medications as prescribed.  She feels rash is no longer painful but still healing.  It never opened up or scabbed.  She still is a few days left of both antibiotics and antivirals.  Patient wants to discuss checking vitamin levels today and what supplements she should continue taking.    REVIEW OF SYSTEMS:      Pertinent items are noted in HPI.  All other systems are negative  PFSH:  Reviewed, no changes        TOBACCO USE:  Social History     Tobacco Use   Smoking Status  Former Smoker     Types: Cigarettes   Smokeless Tobacco Former User       VITALS:  Vitals:    11/27/18 1452   BP: 129/75   Patient Site: Left Arm   Patient Position: Sitting   Cuff Size: Adult Large   Pulse: 84   Resp: 14   Weight: 202 lb 8 oz (91.9 kg)     Wt Readings from Last 3 Encounters:   11/27/18 202 lb 8 oz (91.9 kg)   02/09/18 202 lb 1 oz (91.7 kg)   10/04/16 197 lb 1.9 oz (89.4 kg)       PHYSICAL EXAM:   /75 (Patient Site: Left Arm, Patient Position: Sitting, Cuff Size: Adult Large)   Pulse 84   Resp 14   Wt 202 lb 8 oz (91.9 kg)   BMI 33.70 kg/m     General appearance: alert, appears stated age and cooperative  Ears: normal TM's and external ear canals both ears  Nose: Nares normal. Septum midline. Mucosa normal. No drainage or sinus tenderness.  Throat: lips, mucosa, and tongue normal; teeth and gums normal  Neck: no adenopathy, no carotid bruit, no JVD, supple, symmetrical, trachea midline and thyroid not enlarged, symmetric, no tenderness/mass/nodules  Lungs: clear to auscultation bilaterally  Heart: regular rate and rhythm, S1, S2 normal, no murmur, click, rub or gallop  Skin: healing shingles rash right chest upper shoulder    DATA REVIEWED:  Additional History from Old Records Summarized (2): reviewed [facility] notes  Decision to Obtain Records (1): 0  Radiology Tests Summarized or Ordered (1): 0  Labs Reviewed or Ordered (1): 0  Medicine Test Summarized or Ordered (1): 0  Independent Review of EKG or X-RAY(2 each): 0    The visit lasted a total of 25 minutes face to face with the patient. Over 50% of the time was spent counseling and educating the patient about plan of care.    MEDICATIONS:  Current Outpatient Medications   Medication Sig Dispense Refill     acetaminophen (TYLENOL) 500 MG tablet 2 tabs in the am and 2 tabs at night       calcium carbonate (OS-HERNANDO) 600 mg calcium (1,500 mg) tablet 2 TABLETS DAILY       FLUoxetine (PROZAC) 20 MG capsule Take 1 capsule (20 mg total) by mouth  daily. 90 capsule 3     glucosamine-chondroitin 500-400 mg cap Take 1 capsule by mouth 3 (three) times a day.       MULTIVITAMIN/IRON/FOLIC ACID (COMPLETE WOMEN ORAL) Take by mouth.       naproxen sodium 220 mg cap Take by mouth.       nortriptyline (PAMELOR) 10 MG capsule Take 1 capsule (10 mg total) by mouth at bedtime. 90 capsule 3     omeprazole (PRILOSEC) 40 MG capsule Take 40 mg by mouth.       simvastatin (ZOCOR) 10 MG tablet Take 1 tablet (10 mg total) by mouth at bedtime. 90 tablet 3     triamcinolone (KENALOG) 0.1 % cream Use twice daily to affected area for up to 14 days. 15 g 0     triamterene-hydrochlorothiazide (MAXZIDE) 75-50 mg per tablet Take 1 tablet by mouth daily. 90 tablet 3     UNABLE TO FIND 1 tablet daily [employer].             lisinopril (PRINIVIL,ZESTRIL) 10 MG tablet Take 1 tablet (10 mg total) by mouth daily. 90 tablet 3     OMEGA-3/DHA/EPA/FISH OIL (FISH OIL-OMEGA-3 FATTY ACIDS) 300-1,000 mg capsule Take 2 g by mouth daily.       simvastatin (ZOCOR) 20 MG tablet Take 10 mg by mouth at bedtime.       No current facility-administered medications for this visit.        This note has been dictated using voice recognition software. Any grammatical or context distortions are unintentional and inherent to the software

## 2021-06-23 NOTE — TELEPHONE ENCOUNTER
Refill Approved    Rx renewed per Medication Renewal Policy. Medication was last renewed on 2/9/18.    Yuni Stapleton, [facility] Triage/Med Refill 1/21/2019     Requested Prescriptions   Pending Prescriptions Disp Refills     triamterene-hydrochlorothiazide (MAXZIDE) 75-50 mg per tablet [Pharmacy Med Name: TRIAMTERENE-HCTZ 75-50MG TABS] 90 tablet 3     Sig: TAKE ONE TABLET BY MOUTH EVERY DAY    Diuretics/Combination Diuretics Refill Protocol  Passed - 1/19/2019  6:36 AM       Passed - Visit with PCP or prescribing provider visit in past 12 months    Last office visit with prescriber/PCP: 11/27/2018 Denice Pendleton FNP OR same dept: 11/27/2018 Denice Pendleton FNP OR same specialty: 12/10/2018 Mary Dutta PA-C  Last physical: 2/9/2018 Last MTM visit: Visit date not found   Next visit within 3 mo: Visit date not found  Next physical within 3 mo: Visit date not found  Prescriber OR PCP: KIMBERLY Littlejohn  Last diagnosis associated with med order: 1. Essential hypertension  - triamterene-hydrochlorothiazide (MAXZIDE) 75-50 mg per tablet [Pharmacy Med Name: TRIAMTERENE-HCTZ 75-50MG TABS]; TAKE ONE TABLET BY MOUTH EVERY DAY  Dispense: 90 tablet; Refill: 3  - lisinopril (PRINIVIL,ZESTRIL) 10 MG tablet [Pharmacy Med Name: LISINOPRIL 10MG TABS]; TAKE ONE TABLET BY MOUTH EVERY DAY  Dispense: 90 tablet; Refill: 3    2. Recurrent major depression in remission (H)  - nortriptyline (PAMELOR) 10 MG capsule [Pharmacy Med Name: NORTRIPTYLINE HCL 10MG CAPS]; TAKE ONE CAPSULE BY MOUTH AT BEDTIME  Dispense: 90 capsule; Refill: 3  - FLUoxetine (PROZAC) 20 MG capsule [Pharmacy Med Name: FLUOXETINE HCL 20MG CAPS]; TAKE ONE CAPSULE BY MOUTH EVERY DAY  Dispense: 90 capsule; Refill: 3    If protocol passes may refill for 12 months if within 3 months of last provider visit (or a total of 15 months).            Passed - Serum Potassium in past 12 months     Lab Results   Component Value Date    Potassium 4.3 02/09/2018            Passed -  Serum Sodium in past 12 months     Lab Results   Component Value Date    Sodium 138 02/09/2018            Passed - Blood pressure on file in past 12 months    BP Readings from Last 1 Encounters:   12/10/18 122/84            Passed - Serum Creatinine in past 12 months     Creatinine   Date Value Ref Range Status   02/09/2018 0.71 0.60 - 1.10 mg/dL Final             nortriptyline (PAMELOR) 10 MG capsule [Pharmacy Med Name: NORTRIPTYLINE HCL 10MG CAPS] 90 capsule 3     Sig: TAKE ONE CAPSULE BY MOUTH AT BEDTIME    Tricyclics/Misc Antidepressant/Antianxiety Meds Refill Protocol Passed - 1/19/2019  6:36 AM       Passed - PCP or prescribing provider visit in last year    Last office visit with prescriber/PCP: 11/27/2018 Denice Pendleton FNP OR same dept: 11/27/2018 Denice Pendleton FNP OR same specialty: 12/10/2018 Mary Dutta PA-C  Last physical: 2/9/2018 Last MTM visit: Visit date not found   Next visit within 3 mo: Visit date not found  Next physical within 3 mo: Visit date not found  Prescriber OR PCP: KIMBERLY Littlejohn  Last diagnosis associated with med order: 1. Essential hypertension  - triamterene-hydrochlorothiazide (MAXZIDE) 75-50 mg per tablet [Pharmacy Med Name: TRIAMTERENE-HCTZ 75-50MG TABS]; TAKE ONE TABLET BY MOUTH EVERY DAY  Dispense: 90 tablet; Refill: 3  - lisinopril (PRINIVIL,ZESTRIL) 10 MG tablet [Pharmacy Med Name: LISINOPRIL 10MG TABS]; TAKE ONE TABLET BY MOUTH EVERY DAY  Dispense: 90 tablet; Refill: 3    2. Recurrent major depression in remission (H)  - nortriptyline (PAMELOR) 10 MG capsule [Pharmacy Med Name: NORTRIPTYLINE HCL 10MG CAPS]; TAKE ONE CAPSULE BY MOUTH AT BEDTIME  Dispense: 90 capsule; Refill: 3  - FLUoxetine (PROZAC) 20 MG capsule [Pharmacy Med Name: FLUOXETINE HCL 20MG CAPS]; TAKE ONE CAPSULE BY MOUTH EVERY DAY  Dispense: 90 capsule; Refill: 3    If protocol passes may refill for 12 months if within 3 months of last provider visit (or a total of 15 months).             FLUoxetine (PROZAC)  20 MG capsule [Pharmacy Med Name: FLUOXETINE HCL 20MG CAPS] 90 capsule 3     Sig: TAKE ONE CAPSULE BY MOUTH EVERY DAY    SSRI Refill Protocol  Passed - 1/19/2019  6:36 AM       Passed - PCP or prescribing provider visit in last year    Last office visit with prescriber/PCP: 11/27/2018 Dneice Pendleton FNP OR [place] dept: 11/27/2018 Denice Pendleton FNP OR same specialty: 12/10/2018 Mary Dutta PA-C  Last physical: 2/9/2018 Last MTM visit: Visit date not found   Next visit within 3 mo: Visit date not found  Next physical within 3 mo: Visit date not found  Prescriber OR PCP: KIMBERLY Littlejohn  Last diagnosis associated with med order: 1. Essential hypertension  - triamterene-hydrochlorothiazide (MAXZIDE) 75-50 mg per tablet [Pharmacy Med Name: TRIAMTERENE-HCTZ 75-50MG TABS]; TAKE ONE TABLET BY MOUTH EVERY DAY  Dispense: 90 tablet; Refill: 3  - lisinopril (PRINIVIL,ZESTRIL) 10 MG tablet [Pharmacy Med Name: LISINOPRIL 10MG TABS]; TAKE ONE TABLET BY MOUTH EVERY DAY  Dispense: 90 tablet; Refill: 3    2. Recurrent major depression in remission (H)  - nortriptyline (PAMELOR) 10 MG capsule [Pharmacy Med Name: NORTRIPTYLINE HCL 10MG CAPS]; TAKE ONE CAPSULE BY MOUTH AT BEDTIME  Dispense: 90 capsule; Refill: 3  - FLUoxetine (PROZAC) 20 MG capsule [Pharmacy Med Name: FLUOXETINE HCL 20MG CAPS]; TAKE ONE CAPSULE BY MOUTH EVERY DAY  Dispense: 90 capsule; Refill: 3    If protocol passes may refill for 12 months if within 3 months of last provider visit (or a total of 15 months).             lisinopril (PRINIVIL,ZESTRIL) 10 MG tablet [Pharmacy Med Name: LISINOPRIL 10MG TABS] 90 tablet 3     Sig: TAKE ONE TABLET BY MOUTH EVERY DAY    Ace Inhibitors Refill Protocol Passed - 1/19/2019  6:36 AM       Passed - PCP or prescribing provider visit in past 12 months      Last office visit with prescriber/PCP: 11/27/2018 Denice Pendleton FNP OR [place] dept: 11/27/2018 Denice Pendleton FNP OR same specialty: 12/10/2018 Mary Dutta PA-C  Last physical:  2/9/2018 Last MTM visit: Visit date not found   Next visit within 3 mo: Visit date not found  Next physical within 3 mo: Visit date not found  Prescriber OR PCP: KIMBERLY Littlejohn  Last diagnosis associated with med order: 1. Essential hypertension  - triamterene-hydrochlorothiazide (MAXZIDE) 75-50 mg per tablet [Pharmacy Med Name: TRIAMTERENE-HCTZ 75-50MG TABS]; TAKE ONE TABLET BY MOUTH EVERY DAY  Dispense: 90 tablet; Refill: 3  - lisinopril (PRINIVIL,ZESTRIL) 10 MG tablet [Pharmacy Med Name: LISINOPRIL 10MG TABS]; TAKE ONE TABLET BY MOUTH EVERY DAY  Dispense: 90 tablet; Refill: 3    2. Recurrent major depression in remission (H)  - nortriptyline (PAMELOR) 10 MG capsule [Pharmacy Med Name: NORTRIPTYLINE HCL 10MG CAPS]; TAKE ONE CAPSULE BY MOUTH AT BEDTIME  Dispense: 90 capsule; Refill: 3  - FLUoxetine (PROZAC) 20 MG capsule [Pharmacy Med Name: FLUOXETINE HCL 20MG CAPS]; TAKE ONE CAPSULE BY MOUTH EVERY DAY  Dispense: 90 capsule; Refill: 3    If protocol passes may refill for 12 months if within 3 months of last provider visit (or a total of 15 months).            Passed - Serum Potassium in past 12 months    Lab Results   Component Value Date    Potassium 4.3 02/09/2018            Passed - Blood pressure filed in past 12 months    BP Readings from Last 1 Encounters:   12/10/18 122/84            Passed - Serum Creatinine in past 12 months    Creatinine   Date Value Ref Range Status   02/09/2018 0.71 0.60 - 1.10 mg/dL Final

## 2021-06-25 NOTE — PROGRESS NOTES
Assessment/ Plan     1. Acute pain of right shoulder  Candelaria presents with about a 2-week history of right anterior shoulder pain after having a mild injury.  Her exam is consistent with a rotator cuff injury.  There is no concern for tear at this point.  We will have her start with physical therapy.  She would like to do therapy at [facility] so referral was placed.  I discussed with her she likely has to see one of their physicians first as I did not think we can refer directly to the physical therapy.  - Ambulatory referral to Orthopedics    2. Morbid obesity (H)  She has comorbidities including prediabetes and sleep apnea.      Subjective:       Candelaria Hewitt is a 75 y.o. female who presents for evaluation of shoulder pain.  She states about 2 weeks ago she was getting out of the shower.  She was toweling off her back with a towel when she had a pull along the anterior shoulder.  She did not have any falls or direct injuries.  She has been seen a chiropractor for several adjustments and it really has not made much of a difference.  She is right-handed.  Doing anything over her head seems to be the worse.  She is not noticing any weakness.  She is been taking some Aleve and putting some topical medication on.    Relevant past medical, family, surgical, and social history reviewed with patient, unless noted in HPI, not pertinent for this visit.  Medications were discussed and reconciled.   Review of Systems   A 12 point comprehensive review of systems was negative except as noted.      Current Outpatient Medications   Medication Sig Dispense Refill     acetaminophen (TYLENOL) 500 MG tablet 650 mg. Tylenol Arthritis       calcium carbonate (OS-HERNANDO) 600 mg calcium (1,500 mg) tablet 2 TABLETS DAILY TWICE DAILY       cholecalciferol, vitamin D3, (VITAMIN D3 ORAL) Take 4,000 Units by mouth daily.       FLUoxetine (PROZAC) 20 MG capsule TAKE 1 CAPSULE DAILY 90 capsule 2     glucosamine-chondroitin 500-400 mg cap Take 1  "capsule by mouth 2 (two) times a day.              lisinopriL (PRINIVIL,ZESTRIL) 10 MG tablet TAKE 1 TABLET DAILY 90 tablet 2     MULTIVITAMIN/IRON/FOLIC ACID (COMPLETE WOMEN ORAL) Take 1 tablet by mouth daily.              naproxen sodium 220 mg cap Take by mouth as needed.              nortriptyline (PAMELOR) 10 MG capsule TAKE 1 CAPSULE AT BEDTIME 90 capsule 2     simvastatin (ZOCOR) 10 MG tablet TAKE 1 TABLET AT BEDTIME 90 tablet 2     triamterene-hydrochlorothiazide (MAXZIDE) 75-50 mg per tablet TAKE 1 TABLET DAILY 90 tablet 2     No current facility-administered medications for this visit.          Objective:      /80   Pulse 75   Resp 16   Ht 5' 3.5\" (1.613 m)   Wt 202 lb (91.6 kg)   LMP  (LMP Unknown)   BMI 35.22 kg/m        General appearance: alert, appears stated age and cooperative  Right shoulder: No visible deformities.  She is tender along the anterior rotator cuff.  Range of motion is limited by about 45 degrees with both forward flexion and abduction.  Normal internal rotation.  Strength is 5 out of 5 upper extremities and symmetric.  Reflexes are 2+.  Negative drop test.    No results found for this or any previous visit (from the past 168 hour(s)).       This note has been dictated using voice recognition software. Any grammatical or context distortions are unintentional and inherent to the software  "

## 2021-06-26 NOTE — TELEPHONE ENCOUNTER
Refill Approved    Rx renewed per Medication Renewal Policy. Medication was last renewed on 10/28/20.    Khloe Leavitt, [facility] Triage/Med Refill 6/21/2021     Requested Prescriptions   Pending Prescriptions Disp Refills     triamterene-hydrochlorothiazide (MAXZIDE) 75-50 mg per tablet [Pharmacy Med Name: TRIAMT/HCTZ  TAB 75-50MG] 90 tablet 2     Sig: TAKE 1 TABLET DAILY       Diuretics/Combination Diuretics Refill Protocol  Passed - 6/20/2021 12:01 PM        Passed - Visit with PCP or prescribing provider visit in past 12 months     Last office visit with prescriber/PCP: 6/2/2021 Denice Carranza MD OR same dept: Visit date not found OR same specialty: 6/2/2021 Denice Carranza MD  Last physical: 4/6/2021 Last MTM visit: Visit date not found   Next visit within 3 mo: Visit date not found  Next physical within 3 mo: Visit date not found  Prescriber OR PCP: Denice Carranza MD  Last diagnosis associated with med order: 1. Essential hypertension  - triamterene-hydrochlorothiazide (MAXZIDE) 75-50 mg per tablet [Pharmacy Med Name: TRIAMT/HCTZ  TAB 75-50MG]; TAKE 1 TABLET DAILY  Dispense: 90 tablet; Refill: 2  - lisinopriL (PRINIVIL,ZESTRIL) 10 MG tablet [Pharmacy Med Name: LISINOPRIL TAB 10MG]; TAKE 1 TABLET DAILY  Dispense: 90 tablet; Refill: 2    2. Mixed hyperlipidemia  - simvastatin (ZOCOR) 10 MG tablet [Pharmacy Med Name: SIMVASTATIN  TAB 10MG]; TAKE 1 TABLET AT BEDTIME  Dispense: 90 tablet; Refill: 2    3. Recurrent major depression in remission (H)  - FLUoxetine (PROZAC) 20 MG capsule [Pharmacy Med Name: FLUOXETIN(P) CAP 20MG]; TAKE 1 CAPSULE DAILY  Dispense: 90 capsule; Refill: 2    If protocol passes may refill for 12 months if within 3 months of last provider visit (or a total of 15 months).             Passed - Serum Potassium in past 12 months      Lab Results   Component Value Date    Potassium 4.2 08/18/2020             Passed - Serum Sodium in past 12 months      Lab Results   Component Value Date     Sodium 135 (L) 08/18/2020             Passed - Blood pressure on file in past 12 months     BP Readings from Last 1 Encounters:   06/02/21 130/80             Passed - Serum Creatinine in past 12 months      Creatinine   Date Value Ref Range Status   08/18/2020 0.70 0.60 - 1.10 mg/dL Final                simvastatin (ZOCOR) 10 MG tablet [Pharmacy Med Name: SIMVASTATIN  TAB 10MG] 90 tablet 2     Sig: TAKE 1 TABLET AT BEDTIME       Statins Refill Protocol (Hmg CoA Reductase Inhibitors) Passed - 6/20/2021 12:01 PM        Passed - PCP or prescribing provider visit in past 12 months      Last office visit with prescriber/PCP: 6/2/2021 Denice Carranza MD OR same dept: Visit date not found OR same specialty: 6/2/2021 Denice Carranza MD  Last physical: 4/6/2021 Last MTM visit: Visit date not found   Next visit within 3 mo: Visit date not found  Next physical within 3 mo: Visit date not found  Prescriber OR PCP: Denice Carranza MD  Last diagnosis associated with med order: 1. Essential hypertension  - triamterene-hydrochlorothiazide (MAXZIDE) 75-50 mg per tablet [Pharmacy Med Name: TRIAMT/HCTZ  TAB 75-50MG]; TAKE 1 TABLET DAILY  Dispense: 90 tablet; Refill: 2  - lisinopriL (PRINIVIL,ZESTRIL) 10 MG tablet [Pharmacy Med Name: LISINOPRIL TAB 10MG]; TAKE 1 TABLET DAILY  Dispense: 90 tablet; Refill: 2    2. Mixed hyperlipidemia  - simvastatin (ZOCOR) 10 MG tablet [Pharmacy Med Name: SIMVASTATIN  TAB 10MG]; TAKE 1 TABLET AT BEDTIME  Dispense: 90 tablet; Refill: 2    3. Recurrent major depression in remission (H)  - FLUoxetine (PROZAC) 20 MG capsule [Pharmacy Med Name: FLUOXETIN(P) CAP 20MG]; TAKE 1 CAPSULE DAILY  Dispense: 90 capsule; Refill: 2    If protocol passes may refill for 12 months if within 3 months of last provider visit (or a total of 15 months).                lisinopriL (PRINIVIL,ZESTRIL) 10 MG tablet [Pharmacy Med Name: LISINOPRIL TAB 10MG] 90 tablet 2     Sig: TAKE 1 TABLET DAILY       Ace Inhibitors Refill Protocol  Passed - 6/20/2021 12:01 PM        Passed - PCP or prescribing provider visit in past 12 months       Last office visit with prescriber/PCP: 6/2/2021 Denice Carranza MD OR same dept: Visit date not found OR same specialty: 6/2/2021 Denice Carranza MD  Last physical: 4/6/2021 Last MTM visit: Visit date not found   Next visit within 3 mo: Visit date not found  Next physical within 3 mo: Visit date not found  Prescriber OR PCP: Denice Carranza MD  Last diagnosis associated with med order: 1. Essential hypertension  - triamterene-hydrochlorothiazide (MAXZIDE) 75-50 mg per tablet [Pharmacy Med Name: TRIAMT/HCTZ  TAB 75-50MG]; TAKE 1 TABLET DAILY  Dispense: 90 tablet; Refill: 2  - lisinopriL (PRINIVIL,ZESTRIL) 10 MG tablet [Pharmacy Med Name: LISINOPRIL TAB 10MG]; TAKE 1 TABLET DAILY  Dispense: 90 tablet; Refill: 2    2. Mixed hyperlipidemia  - simvastatin (ZOCOR) 10 MG tablet [Pharmacy Med Name: SIMVASTATIN  TAB 10MG]; TAKE 1 TABLET AT BEDTIME  Dispense: 90 tablet; Refill: 2    3. Recurrent major depression in remission (H)  - FLUoxetine (PROZAC) 20 MG capsule [Pharmacy Med Name: FLUOXETIN(P) CAP 20MG]; TAKE 1 CAPSULE DAILY  Dispense: 90 capsule; Refill: 2    If protocol passes may refill for 12 months if within 3 months of last provider visit (or a total of 15 months).             Passed - Serum Potassium in past 12 months     Lab Results   Component Value Date    Potassium 4.2 08/18/2020             Passed - Blood pressure filed in past 12 months     BP Readings from Last 1 Encounters:   06/02/21 130/80             Passed - Serum Creatinine in past 12 months     Creatinine   Date Value Ref Range Status   08/18/2020 0.70 0.60 - 1.10 mg/dL Final                FLUoxetine (PROZAC) 20 MG capsule [Pharmacy Med Name: FLUOXETIN(P) CAP 20MG] 90 capsule 2     Sig: TAKE 1 CAPSULE DAILY       SSRI Refill Protocol  Passed - 6/20/2021 12:01 PM        Passed - PCP or prescribing provider visit in last year     Last office visit with  prescriber/PCP: 6/2/2021 Denice Carranza MD OR same dept: Visit date not found OR same specialty: 6/2/2021 Denice Carranza MD  Last physical: 4/6/2021 Last MTM visit: Visit date not found   Next visit within 3 mo: Visit date not found  Next physical within 3 mo: Visit date not found  Prescriber OR PCP: Denice Carranza MD  Last diagnosis associated with med order: 1. Essential hypertension  - triamterene-hydrochlorothiazide (MAXZIDE) 75-50 mg per tablet [Pharmacy Med Name: TRIAMT/HCTZ  TAB 75-50MG]; TAKE 1 TABLET DAILY  Dispense: 90 tablet; Refill: 2  - lisinopriL (PRINIVIL,ZESTRIL) 10 MG tablet [Pharmacy Med Name: LISINOPRIL TAB 10MG]; TAKE 1 TABLET DAILY  Dispense: 90 tablet; Refill: 2    2. Mixed hyperlipidemia  - simvastatin (ZOCOR) 10 MG tablet [Pharmacy Med Name: SIMVASTATIN  TAB 10MG]; TAKE 1 TABLET AT BEDTIME  Dispense: 90 tablet; Refill: 2    3. Recurrent major depression in remission (H)  - FLUoxetine (PROZAC) 20 MG capsule [Pharmacy Med Name: FLUOXETIN(P) CAP 20MG]; TAKE 1 CAPSULE DAILY  Dispense: 90 capsule; Refill: 2    If protocol passes may refill for 12 months if within 3 months of last provider visit (or a total of 15 months).

## 2021-07-03 NOTE — ADDENDUM NOTE
Addendum Note by Denice Jefferson FNP at 2/23/2018  3:35 PM     Author: Denice Jefferson FNP Service: -- Author Type: Nurse Practitioner    Filed: 2/23/2018  3:35 PM Encounter Date: 2/23/2018 Status: Signed    : Denice Jefferson FNP (Nurse Practitioner)    Addended by: DENICE JEFFERSON on: 2/23/2018 03:35 PM        Modules accepted: Orders

## 2021-07-03 NOTE — ADDENDUM NOTE
Addendum Note by Donn Hodgson LPN at 7/10/2020  4:19 PM     Author: Donn Hodgson LPN Service: -- Author Type: Licensed Nurse    Filed: 7/10/2020  4:19 PM Encounter Date: 7/10/2020 Status: Signed    : Donn Hodgson LPN (Licensed Nurse)    Addended by: DONN HODGSON on: 7/10/2020 04:19 PM        Modules accepted: Orders

## 2021-07-06 VITALS
DIASTOLIC BLOOD PRESSURE: 80 MMHG | SYSTOLIC BLOOD PRESSURE: 130 MMHG | HEART RATE: 75 BPM | RESPIRATION RATE: 16 BRPM | HEIGHT: 64 IN | WEIGHT: 202 LBS | BODY MASS INDEX: 34.49 KG/M2

## 2021-07-27 DIAGNOSIS — F33.40 RECURRENT MAJOR DEPRESSION IN REMISSION (H): ICD-10-CM

## 2021-07-28 RX ORDER — NORTRIPTYLINE HCL 10 MG
CAPSULE ORAL
Qty: 90 CAPSULE | Refills: 2 | Status: SHIPPED | OUTPATIENT
Start: 2021-07-28 | End: 2021-11-22

## 2021-08-24 ENCOUNTER — APPOINTMENT (OUTPATIENT)
Dept: RADIOLOGY | Facility: HOSPITAL | Age: 76
End: 2021-08-24
Attending: EMERGENCY MEDICINE
Payer: COMMERCIAL

## 2021-08-24 ENCOUNTER — HOSPITAL ENCOUNTER (EMERGENCY)
Facility: HOSPITAL | Age: 76
Discharge: HOME | End: 2021-08-24
Attending: EMERGENCY MEDICINE | Admitting: EMERGENCY MEDICINE
Payer: COMMERCIAL

## 2021-08-24 VITALS
TEMPERATURE: 97.4 F | OXYGEN SATURATION: 94 % | DIASTOLIC BLOOD PRESSURE: 75 MMHG | WEIGHT: 190 LBS | SYSTOLIC BLOOD PRESSURE: 152 MMHG | BODY MASS INDEX: 33.13 KG/M2 | RESPIRATION RATE: 17 BRPM | HEART RATE: 78 BPM

## 2021-08-24 DIAGNOSIS — R07.89 ATYPICAL CHEST PAIN: ICD-10-CM

## 2021-08-24 LAB
ALBUMIN SERPL-MCNC: 4.2 G/DL (ref 3.5–5)
ALP SERPL-CCNC: 97 U/L (ref 45–120)
ALT SERPL W P-5'-P-CCNC: 53 U/L (ref 0–45)
ANION GAP SERPL CALCULATED.3IONS-SCNC: 12 MMOL/L (ref 5–18)
AST SERPL W P-5'-P-CCNC: 39 U/L (ref 0–40)
BASOPHILS # BLD AUTO: 0 10E3/UL (ref 0–0.2)
BASOPHILS NFR BLD AUTO: 0 %
BILIRUB SERPL-MCNC: 0.7 MG/DL (ref 0–1)
BUN SERPL-MCNC: 19 MG/DL (ref 8–28)
CALCIUM SERPL-MCNC: 9.7 MG/DL (ref 8.5–10.5)
CHLORIDE BLD-SCNC: 98 MMOL/L (ref 98–107)
CO2 SERPL-SCNC: 24 MMOL/L (ref 22–31)
CREAT SERPL-MCNC: 0.72 MG/DL (ref 0.6–1.1)
EOSINOPHIL # BLD AUTO: 0.2 10E3/UL (ref 0–0.7)
EOSINOPHIL NFR BLD AUTO: 3 %
ERYTHROCYTE [DISTWIDTH] IN BLOOD BY AUTOMATED COUNT: 12.8 % (ref 10–15)
GFR SERPL CREATININE-BSD FRML MDRD: 83 ML/MIN/1.73M2
GLUCOSE BLD-MCNC: 127 MG/DL (ref 70–125)
HCT VFR BLD AUTO: 43.5 % (ref 35–47)
HGB BLD-MCNC: 14.4 G/DL (ref 11.7–15.7)
HOLD SPECIMEN: NORMAL
IMM GRANULOCYTES # BLD: 0 10E3/UL
IMM GRANULOCYTES NFR BLD: 1 %
LIPASE SERPL-CCNC: 34 U/L (ref 0–52)
LYMPHOCYTES # BLD AUTO: 1.1 10E3/UL (ref 0.8–5.3)
LYMPHOCYTES NFR BLD AUTO: 18 %
MCH RBC QN AUTO: 30.2 PG (ref 26.5–33)
MCHC RBC AUTO-ENTMCNC: 33.1 G/DL (ref 31.5–36.5)
MCV RBC AUTO: 91 FL (ref 78–100)
MONOCYTES # BLD AUTO: 0.5 10E3/UL (ref 0–1.3)
MONOCYTES NFR BLD AUTO: 8 %
NEUTROPHILS # BLD AUTO: 4.2 10E3/UL (ref 1.6–8.3)
NEUTROPHILS NFR BLD AUTO: 70 %
NRBC # BLD AUTO: 0 10E3/UL
NRBC BLD AUTO-RTO: 0 /100
PLATELET # BLD AUTO: 240 10E3/UL (ref 150–450)
POTASSIUM BLD-SCNC: 3.5 MMOL/L (ref 3.5–5)
PROT SERPL-MCNC: 7.4 G/DL (ref 6–8)
RBC # BLD AUTO: 4.77 10E6/UL (ref 3.8–5.2)
SODIUM SERPL-SCNC: 134 MMOL/L (ref 136–145)
TROPONIN I SERPL-MCNC: 0.05 NG/ML (ref 0–0.29)
WBC # BLD AUTO: 5.9 10E3/UL (ref 4–11)

## 2021-08-24 PROCEDURE — 84484 ASSAY OF TROPONIN QUANT: CPT | Performed by: EMERGENCY MEDICINE

## 2021-08-24 PROCEDURE — 71046 X-RAY EXAM CHEST 2 VIEWS: CPT

## 2021-08-24 PROCEDURE — 85025 COMPLETE CBC W/AUTO DIFF WBC: CPT | Performed by: EMERGENCY MEDICINE

## 2021-08-24 PROCEDURE — 250N000013 HC RX MED GY IP 250 OP 250 PS 637: Performed by: EMERGENCY MEDICINE

## 2021-08-24 PROCEDURE — 93005 ELECTROCARDIOGRAM TRACING: CPT | Performed by: STUDENT IN AN ORGANIZED HEALTH CARE EDUCATION/TRAINING PROGRAM

## 2021-08-24 PROCEDURE — 36415 COLL VENOUS BLD VENIPUNCTURE: CPT | Performed by: EMERGENCY MEDICINE

## 2021-08-24 PROCEDURE — 93005 ELECTROCARDIOGRAM TRACING: CPT | Performed by: EMERGENCY MEDICINE

## 2021-08-24 PROCEDURE — 83690 ASSAY OF LIPASE: CPT | Performed by: EMERGENCY MEDICINE

## 2021-08-24 PROCEDURE — 99285 EMERGENCY DEPT VISIT HI MDM: CPT | Mod: 25

## 2021-08-24 PROCEDURE — 250N000009 HC RX 250: Performed by: EMERGENCY MEDICINE

## 2021-08-24 PROCEDURE — 84155 ASSAY OF PROTEIN SERUM: CPT | Performed by: EMERGENCY MEDICINE

## 2021-08-24 PROCEDURE — 82374 ASSAY BLOOD CARBON DIOXIDE: CPT | Performed by: EMERGENCY MEDICINE

## 2021-08-24 RX ORDER — ASPIRIN 81 MG/1
81 TABLET, CHEWABLE ORAL ONCE
Status: COMPLETED | OUTPATIENT
Start: 2021-08-24 | End: 2021-08-24

## 2021-08-24 RX ADMIN — LIDOCAINE HYDROCHLORIDE 30 ML: 20 SOLUTION ORAL; TOPICAL at 16:20

## 2021-08-24 RX ADMIN — ASPIRIN 81 MG: 81 TABLET, CHEWABLE ORAL at 16:19

## 2021-08-24 NOTE — ED PROVIDER NOTES
EMERGENCY DEPARTMENT ENCOUNTER      NAME: Candelaria Hewitt  AGE: 75 year old female  YOB: 1945  MRN: [id number]  EVALUATION DATE & TIME: 8/24/2021  3:52 PM    PCP: Denice Carranza    ED PROVIDER: Jose Hanley M.D.      Chief Complaint   Patient presents with     Chest Pain         FINAL IMPRESSION:  1. Atypical chest pain          ED COURSE & MEDICAL DECISION MAKING:    Pertinent Labs & Imaging studies reviewed. (See chart for details)  75 year old female presents to the Emergency Department for evaluation of chest pain. Patient appears non toxic with stable vitals signs, patient is afebrile with no tachycardia or hypoxia, no increased work of breathing. Overall exam is benign.  Lungs are clear, abdomen is benign, patient denies any ripping or tearing chest discomfort of the back or shoulders, pleurisy, hemoptysis, fevers or productive cough.  Patient does have some cardiac risk factors though her story is atypical for ACS, currently rates her pain as minimal, 2 out of 10 on the pain scale.  Patient states that discomfort became worse shortly after eating, she has no right upper quadrant tenderness to suggest cholecystitis, ascending cholangitis, considered also possible gastritis or GERD.  Patient states that she took Pepcid prior to arrival with some improvement.  No fever, no cough, no shortness of breath or body aches, certainly nothing to suggest PE, dissection, esophageal rupture, Covid, pneumonia, CHF, COPD, asthma, or other restrictive lung process.  We will obtain screening labs including troponin, ECG and a chest x-ray.  Patient was given aspirin and GI cocktail here.    Reassessment: Troponin is negative, ECG nonischemic, following our interventions patient felt much improved, symptoms nearly resolved.  Symptoms have been present for greater than 6 hours and with negative troponin, benign exam and overall well appearance feel she is safe for discharge at this time as there is nothing to  suggest cardiac ischemia.  That said she did have heart score of 4 and so feel that she needs to follow-up with the rapid access clinic in the next 24 to 48 hours.  Rest of the labs showed no acute concerning findings and chest x-ray reported no acute concerning findings.  I will encourage the patient to continue her home medications, to take her home Pepcid and to follow-up with rapid access in the next 1 or 2 days.  Discussed all these findings recommendations the patient who otherwise felt very reassured and comfortable with discharge.  Return precautions provided, such as patient was instructed to return for worsening chest pain, shortness of breath or any greater concerns.    3:55 PM I met with the patient, obtained history, performed an initial exam, and discussed options and plan for diagnostics and treatment here in the ED. [place] PPE was worn during the entire patient encounter.  5:22 PM I rechecked and updated the patient. Repeat exam is benign. Discussed discharge with close follow-up. Patient was agreeable.    At the conclusion of the encounter I discussed the results of all of the tests and the disposition. The questions were answered and return precautions provided. The patient or family acknowledged understanding and was agreeable with the care plan.         MEDICATIONS GIVEN IN THE EMERGENCY:  Medications   aspirin (ASA) chewable tablet 81 mg (81 mg Oral Given 8/24/21 1619)   lidocaine (XYLOCAINE) 2 % 15 mL, alum & mag hydroxide-simethicone (MAALOX) 15 mL GI Cocktail (30 mLs Oral Given 8/24/21 1620)       NEW PRESCRIPTIONS STARTED AT TODAY'S ER VISIT  New Prescriptions    No medications on file            =================================================================    HPI    Patient information was obtained from: Patient    Use of Intrepreter: N/A       Candelaria Hewitt is a 75 year old female with a pertinent history of hypertension and hyperlipidemia, who presents for evaluation of chest pain  "beginning around 10:00 this morning. Patient states she woke up early this morning and fell back asleep without her CPAP on for the last couple hours of sleep. She notes that a centralized chest pain described as a \"pressure\" onset around 10:00. Patient then ate soup, crackers, and ginger ale which worsened her pain. She rated her pain as a 7/10 and then took some Tylenol and Pepcid-C which lowered her pain to her current level of 3/10. In addition, she endorses associated nausea but denies vomiting. Patient notes she has a history of heartburn that typically presents with a lot of burping. Denies any history of heart attacks or heart disease. Patient did not take any aspirin. She notes that she quit smoking 40 years ago, occasionally consumes alcohol, and does not do any drugs.    Denies fever, dysuria, hematuria, blood in stool, constipation, diarrhea, cough, shortness of breath, or any additional symptoms.    REVIEW OF SYSTEMS   Constitutional:  Denies fever, chills  Respiratory:  Denies productive cough or increased work of breathing  Cardiovascular:  Positive for chest pain. Denies palpitations  GI:  Positive for nausea. Denies abdominal pain, vomiting, or change in bowel or bladder habits   Musculoskeletal:  Denies any new muscle/joint swelling  Skin:  Denies rash   Neurologic:  Denies focal weakness  All systems negative except as marked.     PAST MEDICAL HISTORY:  Past Medical History:   Diagnosis Date     Depression      Hyperlipidemia      Hypertension        PAST SURGICAL HISTORY:  Past Surgical History:   Procedure Laterality Date     ECTOPIC PREGNANCY SURGERY       HYSTERECTOMY           CURRENT MEDICATIONS:    Prior to Admission medications    Medication Sig Start Date End Date Taking? Authorizing Provider   acetaminophen (TYLENOL) 500 MG tablet [ACETAMINOPHEN (TYLENOL) 500 MG TABLET] 650 mg. Tylenol Arthritis 2/9/18   Provider, Historical   calcium carbonate (OS-HERNANDO) 600 mg calcium (1,500 mg) tablet " [CALCIUM CARBONATE (OS-HERNANDO) 600 MG CALCIUM (1,500 MG) TABLET] 2 TABLETS DAILY TWICE DAILY 10/23/03   Provider, Historical   cholecalciferol, vitamin D3, (VITAMIN D3 ORAL) [CHOLECALCIFEROL, VITAMIN D3, (VITAMIN D3 ORAL)] Take 4,000 Units by mouth daily. 3/29/19   Provider, Historical   FLUoxetine (PROZAC) 20 MG capsule [FLUOXETINE (PROZAC) 20 MG CAPSULE] TAKE 1 CAPSULE DAILY 6/21/21   Denice Carranza   FLUoxetine (PROZAC) 20 MG capsule [FLUOXETINE (PROZAC) 20 MG CAPSULE] TAKE 1 CAPSULE DAILY 10/28/20   Denice Carranza   glucosamine-chondroitin 500-400 mg cap [GLUCOSAMINE-CHONDROITIN 500-400 MG CAP] Take 1 capsule by mouth 2 (two) times a day.        9/30/16   Provider, Historical   lisinopriL (PRINIVIL,ZESTRIL) 10 MG tablet [LISINOPRIL (PRINIVIL,ZESTRIL) 10 MG TABLET] TAKE 1 TABLET DAILY 6/21/21   Denice Carranza   lisinopriL (PRINIVIL,ZESTRIL) 10 MG tablet [LISINOPRIL (PRINIVIL,ZESTRIL) 10 MG TABLET] TAKE 1 TABLET DAILY 10/28/20   Denice Carranza   MULTIVITAMIN/IRON/FOLIC ACID (COMPLETE WOMEN ORAL) [MULTIVITAMIN/IRON/FOLIC ACID (COMPLETE WOMEN ORAL)] Take 1 tablet by mouth daily.        9/30/16   Provider, Historical   naproxen sodium 220 mg cap [NAPROXEN SODIUM 220 MG CAP] Take by mouth as needed.        2/9/18   Provider, Historical   nortriptyline (PAMELOR) 10 MG capsule TAKE 1 CAPSULE AT BEDTIME 7/28/21   Denice Carranza   simvastatin (ZOCOR) 10 MG tablet [SIMVASTATIN (ZOCOR) 10 MG TABLET] TAKE 1 TABLET AT BEDTIME 6/21/21   Denice Carranza   simvastatin (ZOCOR) 10 MG tablet [SIMVASTATIN (ZOCOR) 10 MG TABLET] TAKE 1 TABLET AT BEDTIME 10/28/20   Denice Carranza   triamterene-hydrochlorothiazide (MAXZIDE) 75-50 mg per tablet [TRIAMTERENE-HYDROCHLOROTHIAZIDE (MAXZIDE) 75-50 MG PER TABLET] TAKE 1 TABLET DAILY 6/21/21   Denice Carranza   triamterene-hydrochlorothiazide (MAXZIDE) 75-50 mg per tablet [TRIAMTERENE-HYDROCHLOROTHIAZIDE (MAXZIDE) 75-50 MG PER TABLET] TAKE 1 TABLET DAILY 10/28/20   Denice Carranza        ALLERGIES:  Allergies    Allergen Reactions     Influenza Vaccine Tri-Sp 09-10 [Influenza Vaccines] Unknown     Leg weakness for months, ? GBS     Morphine Nausea     Tolerate vicodin and codeine     Methocarbamol Rash       FAMILY HISTORY:  Family History   Problem Relation Age of Onset     Hypertension Mother      Kidney Disease Mother      Colon Cancer Father      Alcoholism Father      Diabetes Father      Breast Cancer Sister 68.00       SOCIAL HISTORY:   Social History     Socioeconomic History     Marital status:      Spouse name: Not on file     Number of children: Not on file     Years of education: Not on file     Highest education level: Not on file   Occupational History     Not on file   Tobacco Use     Smoking status: Former Smoker     Types: Cigarettes     Smokeless tobacco: Former User   Substance and Sexual Activity     Alcohol use: Yes     Alcohol/week: 1.0 standard drinks     Drug use: No     Sexual activity: Not Currently     Partners: Male   Other Topics Concern     Not on file   Social History Narrative     Not on file     Social Determinants of Health     Financial Resource Strain:      Difficulty of Paying Living Expenses:    Food Insecurity:      Worried About Running Out of Food in the Last Year:      Ran Out of Food in the Last Year:    Transportation Needs:      Lack of Transportation (Medical):      Lack of Transportation (Non-Medical):    Physical Activity:      Days of Exercise per Week:      Minutes of Exercise per Session:    Stress:      Feeling of Stress :    Social Connections:      Frequency of Communication with Friends and Family:      Frequency of Social Gatherings with Friends and Family:      Attends Christian Services:      Active Member of Clubs or Organizations:      Attends Club or Organization Meetings:      Marital Status:    Intimate Partner Violence:      Fear of Current or Ex-Partner:      Emotionally Abused:      Physically Abused:      Sexually Abused:        VITALS:  Patient  Vitals for the past 24 hrs:   BP Temp Temp src Pulse Resp SpO2 Weight   08/24/21 1615 -- -- -- 78 17 94 % --   08/24/21 1600 (!) 152/75 -- -- 85 14 98 % --   08/24/21 1545 (!) 161/78 97.4  F (36.3  C) Temporal 88 18 96 % --   08/24/21 1543 -- -- -- -- -- -- 86.2 kg (190 lb)        PHYSICAL EXAM    Constitutional:  Awake, alert, in no apparent distress  HENT:  Normocephalic, Atraumatic. Bilateral external ears normal. Oropharynx moist. Nose normal. Neck- Normal range of motion with no guarding, No midline cervical tenderness, Supple, No stridor.   Eyes:  PERRL, EOMI with no signs of entrapment, Conjunctiva normal, No discharge.   Respiratory:  Normal breath sounds, No respiratory distress, No wheezing.    Cardiovascular:  Normal heart rate, Normal rhythm, No appreciable rubs or gallops.   GI:  Soft, No tenderness, No distension, No palpable masses  Musculoskeletal:  Intact distal pulses, No edema. Good range of motion in all major joints. No tenderness to palpation or major deformities noted.  Integument:  Warm, Dry, No erythema, No rash.   Neurologic:  Alert & oriented, Normal motor function, Normal sensory function, No focal deficits noted.   Psychiatric:  Affect normal, Judgment normal, Mood normal.     LAB:  All pertinent labs reviewed and interpreted.  Results for orders placed or performed during the hospital encounter of 08/24/21   XR Chest 2 Views    Impression    IMPRESSION: No change. Minimal linear scarring within right middle lobe, lungs otherwise clear. Heart size and pulmonary vessels are normal   Extra Blue Top Tube   Result Value Ref Range    Hold Specimen JIC    Extra Red Top Tube   Result Value Ref Range    Hold Specimen JIC    Extra Green Top (Lithium Heparin) Tube   Result Value Ref Range    Hold Specimen JIC    Extra Purple Top Tube   Result Value Ref Range    Hold Specimen JIC    Result Value Ref Range    Troponin I 0.05 0.00 - 0.29 ng/mL   Result Value Ref Range    Lipase 34 0 - 52 U/L    Comprehensive metabolic panel   Result Value Ref Range    Sodium 134 (L) 136 - 145 mmol/L    Potassium 3.5 3.5 - 5.0 mmol/L    Chloride 98 98 - 107 mmol/L    Carbon Dioxide (CO2) 24 22 - 31 mmol/L    Anion Gap 12 5 - 18 mmol/L    Urea Nitrogen 19 8 - 28 mg/dL    Creatinine 0.72 0.60 - 1.10 mg/dL    Calcium 9.7 8.5 - 10.5 mg/dL    Glucose 127 (H) 70 - 125 mg/dL    Alkaline Phosphatase 97 45 - 120 U/L    AST 39 0 - 40 U/L    ALT 53 (H) 0 - 45 U/L    Protein Total 7.4 6.0 - 8.0 g/dL    Albumin 4.2 3.5 - 5.0 g/dL    Bilirubin Total 0.7 0.0 - 1.0 mg/dL    GFR Estimate 83 >60 mL/min/1.73m2   CBC with platelets and differential   Result Value Ref Range    WBC Count 5.9 4.0 - 11.0 10e3/uL    RBC Count 4.77 3.80 - 5.20 10e6/uL    Hemoglobin 14.4 11.7 - 15.7 g/dL    Hematocrit 43.5 35.0 - 47.0 %    MCV 91 78 - 100 fL    MCH 30.2 26.5 - 33.0 pg    MCHC 33.1 31.5 - 36.5 g/dL    RDW 12.8 10.0 - 15.0 %    Platelet Count 240 150 - 450 10e3/uL    % Neutrophils 70 %    % Lymphocytes 18 %    % Monocytes 8 %    % Eosinophils 3 %    % Basophils 0 %    % Immature Granulocytes 1 %    NRBCs per 100 WBC 0 <1 /100    Absolute Neutrophils 4.2 1.6 - 8.3 10e3/uL    Absolute Lymphocytes 1.1 0.8 - 5.3 10e3/uL    Absolute Monocytes 0.5 0.0 - 1.3 10e3/uL    Absolute Eosinophils 0.2 0.0 - 0.7 10e3/uL    Absolute Basophils 0.0 0.0 - 0.2 10e3/uL    Absolute Immature Granulocytes 0.0 <=0.0 10e3/uL    Absolute NRBCs 0.0 10e3/uL       RADIOLOGY:  XR Chest 2 Views   Final Result   IMPRESSION: No change. Minimal linear scarring within right middle lobe, lungs otherwise clear. Heart size and pulmonary vessels are normal             EKG:    Normal sinus rhythm, no specific ST acute ischemic changes, no concerning dysrhythmias interval prolongation, no priors for comparison  I have independently reviewed and interpreted the EKG(s) documented above.    PROCEDURES:          Young PARKS, am serving as a scribe to document services personally performed by  Jose Hanley MD, based on my observation and the provider's statements to me. I, Jose Hanley MD attest that Young Giron is acting in a scribe capacity, has observed my performance of the services and has documented them in accordance with my direction.    Jose Hanley M.D.  Emergency Medicine  [facility] EMERGENCY DEPARTMENT  [address] [place] [id number] [phone]  Dept: [phone]     Jose Hanley MD  08/24/21 5019

## 2021-08-24 NOTE — ED NOTES
Nursing assessment--    Patient brought in by her son who is at the bedside.  Patient states she awoke from a nap around 1000 with some chest pressure.  She had fallen sleep without her CPAP mask on.  She took tylenol and Pepcid at home. She said the pressure has been waxing and weaning but continues through the day. Rates it 2-3/10 at this time. Ernesto any SOB. Lungs clear.  No edema noted.

## 2021-08-25 LAB
ATRIAL RATE - MUSE: 79 BPM
DIASTOLIC BLOOD PRESSURE - MUSE: NORMAL MMHG
INTERPRETATION ECG - MUSE: NORMAL
P AXIS - MUSE: 50 DEGREES
PR INTERVAL - MUSE: 184 MS
QRS DURATION - MUSE: 84 MS
QT - MUSE: 402 MS
QTC - MUSE: 460 MS
R AXIS - MUSE: 12 DEGREES
SYSTOLIC BLOOD PRESSURE - MUSE: NORMAL MMHG
T AXIS - MUSE: 49 DEGREES
VENTRICULAR RATE- MUSE: 79 BPM

## 2021-08-27 ENCOUNTER — OFFICE VISIT (OUTPATIENT)
Dept: CARDIOLOGY | Facility: CLINIC | Age: 76
End: 2021-08-27
Payer: COMMERCIAL

## 2021-08-27 VITALS
DIASTOLIC BLOOD PRESSURE: 84 MMHG | RESPIRATION RATE: 16 BRPM | WEIGHT: 200 LBS | SYSTOLIC BLOOD PRESSURE: 140 MMHG | HEART RATE: 86 BPM | HEIGHT: 64 IN | BODY MASS INDEX: 34.15 KG/M2

## 2021-08-27 DIAGNOSIS — K21.00 GASTROESOPHAGEAL REFLUX DISEASE WITH ESOPHAGITIS WITHOUT HEMORRHAGE: ICD-10-CM

## 2021-08-27 DIAGNOSIS — G47.33 OBSTRUCTIVE SLEEP APNEA SYNDROME: Primary | ICD-10-CM

## 2021-08-27 DIAGNOSIS — R07.89 ATYPICAL CHEST PAIN: ICD-10-CM

## 2021-08-27 DIAGNOSIS — E66.01 MORBID OBESITY (H): ICD-10-CM

## 2021-08-27 PROCEDURE — 99204 OFFICE O/P NEW MOD 45 MIN: CPT | Performed by: INTERNAL MEDICINE

## 2021-08-27 ASSESSMENT — MIFFLIN-ST. JEOR: SCORE: 1384.25

## 2021-08-27 NOTE — PROGRESS NOTES
CARDIOLOGY RAPID ACCESS CLINIC CONSULT NOTE     Assessment/Plan:   1.  Atypical chest pain.  In a 75-year-old woman with hypertension hyperlipidemia and obstructive sleep apnea.  Discomfort appears most likely to be of GI etiology but would favor further evaluation for pharmacologic nuclear stress test to exclude coronary disease as a contributing factor.  2.  Hyper lipidemia on low-dose simvastatin.  She follows with Dr. Carranza on this  3.  Probable GE reflux.  Advised trial of Pepcid AC  4.  Sedentary lifestyle.  If stress test shows no evidence of inducible ischemia, encouraged to resume full exercise without limitations.    Follow up as needed or for abnormalities on stress testing     History of Present Illness:     It is my pleasure to see Candelaria Hewitt at the [facility] RAPID ACCESS clinic for evaluation of atypical chest pain.    Candelaria Hewitt is a 75 year old female with a past medical history of hypertension and hyperlipidemia, obstructive sleep apnea on CPAP, and obesity.  Her  of 51 years  suddenly of a myocardial infarction 5 years ago, she has struggled with that since that time and still grieves, she reports tearfully.  She was exercising regularly doing Pilates until the pandemic, she has not resumed exercise though she is vaccinated.  She has had problems intermittently with reflux symptoms after meals, and has had some relief previously with Pepcid AC on an as-needed basis.  He denies any exertional dyspnea or paroxysmal nocturnal dyspnea.    Couple of days ago she awakened with some stomach upset that was mild.  After eating the discomfort became worse and she presented to the emergency room for further evaluation.  Troponins and ECGs were within normal limits she was discharged to follow-up.  She had relief of her discomfort after about 6 hours which she attributes to the GI cocktail she was given in the ER.  She has not had recurrent symptoms since that time  but is somewhat fearful of exercising.  She is limited her activity somewhat because of osteoarthritis of the knee.    She denies paroxysmal nocturnal dyspnea, palpitations, syncope or near syncopal spells.  She did not monitor her blood pressure.    Past Medical History:     Patient Active Problem List   Diagnosis     Essential hypertension     Family history of malignant neoplasm of gastrointestinal tract     Impaired fasting glucose     Recurrent major depression in remission (H)     Adiposity     Obstructive sleep apnea syndrome     Hyperlipidemia     Varicose veins of both lower extremities with pain     Obesity (BMI 35.0-39.9) with comorbidity (H)       Past Surgical History:     Past Surgical History:   Procedure Laterality Date     ECTOPIC PREGNANCY SURGERY       HYSTERECTOMY         Family History:     Family History   Problem Relation Age of Onset     Hypertension Mother      Kidney Disease Mother      Colon Cancer Father      Alcoholism Father      Diabetes Father      Breast Cancer Sister 68.00     Family history reviewed and is not pertinent to the chief complaint or presenting problem    Social History:    reports that she has quit smoking. Her smoking use included cigarettes. She has quit using smokeless tobacco. She reports current alcohol use of about 1.0 standard drinks of alcohol per week. She reports that she does not use drugs.   Reports loneliness since her 's passing 5 years ago, which she still mourns.  Lives with her son and grandchildren, feels very fortunate    Exercise: Limited since Covid started.  Previously did Pilates, and enjoyed interaction.    Sleep: Started on CPAP for 15-year    Meds:     Current Outpatient Medications   Medication Sig Dispense Refill     acetaminophen (TYLENOL) 325 MG tablet Take 1,300 mg by mouth daily        calcium carbonate (OS-HERNANDO) 600 mg calcium (1,500 mg) tablet [CALCIUM CARBONATE (OS-HERNANDO) 600 MG CALCIUM (1,500 MG) TABLET] 2 TABLETS DAILY TWICE  "DAILY       FLUoxetine (PROZAC) 20 MG capsule [FLUOXETINE (PROZAC) 20 MG CAPSULE] TAKE 1 CAPSULE DAILY 90 capsule 2     glucosamine-chondroitin 500-400 mg cap [GLUCOSAMINE-CHONDROITIN 500-400 MG CAP] Take 1 capsule by mouth 2 (two) times a day.              lisinopriL (PRINIVIL,ZESTRIL) 10 MG tablet [LISINOPRIL (PRINIVIL,ZESTRIL) 10 MG TABLET] TAKE 1 TABLET DAILY 90 tablet 2     MULTIVITAMIN/IRON/FOLIC ACID (COMPLETE WOMEN ORAL) [MULTIVITAMIN/IRON/FOLIC ACID (COMPLETE WOMEN ORAL)] Take 1 tablet by mouth daily.              naproxen sodium 220 mg cap Take 220 mg by mouth daily        nortriptyline (PAMELOR) 10 MG capsule TAKE 1 CAPSULE AT BEDTIME 90 capsule 2     polyethylene glycol-propylene glycol (SYSTANE ULTRA) 0.4-0.3 % SOLN ophthalmic solution Place 1 drop into both eyes every hour as needed for dry eyes       simvastatin (ZOCOR) 10 MG tablet [SIMVASTATIN (ZOCOR) 10 MG TABLET] TAKE 1 TABLET AT BEDTIME 90 tablet 2     triamterene-hydrochlorothiazide (MAXZIDE) 75-50 mg per tablet [TRIAMTERENE-HYDROCHLOROTHIAZIDE (MAXZIDE) 75-50 MG PER TABLET] TAKE 1 TABLET DAILY 90 tablet 2     vitamin D3 (VITAMIN D3) 50 mcg (2000 units) tablet Take 4,000 Units by mouth daily          Allergies:   Haemophilus influenzae, Influenza vaccine tri-sp 09-10 [influenza vaccines], Morphine, and Methocarbamol    Review of Systems:        Positive for shortness of breath, heartburn, joint pain, loss of balance, depression, and easy bruising.  12 point review of systems otherwise negative      Objective:      Physical Exam  90.7 kg (200 lb)  1.613 m (5' 3.5\")  Body mass index is 34.87 kg/m .  BP (!) 140/84 (BP Location: Right arm, Patient Position: Sitting, Cuff Size: Adult Large)   Pulse 86   Resp 16   Ht 1.613 m (5' 3.5\")   Wt 90.7 kg (200 lb)   BMI 34.87 kg/m          General Appearance : Pleasant, awake, Alert, No acute distress  HEENT: No Scleral icterus; the mucous membranes were pink and moist.  Conjunctivae not injected  Neck:  " No cervical bruits, jugular venous distention, or thyromegaly.  Stout.  Chest: The spine was straight. Chest wall symmetric  Lungs: Respirations unlabored; the lungs are clear to auscultation.  No wheezing   Cardiovascular:   Normal point of maximal impulse.  Auscultation reveals normal first and second heart sounds with no murmurs, rubs, or gallops.  Carotid, radial, and dorsalis pedal pulses are intact and symmetric.    Abdomen: Obese.  No organomegaly, masses, bruits, or tenderness. Bowels sounds are present  Extremities: No edema  Skin: No xanthelasma. Warm, Dry.  Musculoskeletal: No tenderness.  Neurologic: Alert and oriented ×3. Speech is fluent.      EKG (personally reviewed):  24 August 2021: Normal sinus rhythm.  Normal ECG.    Cardiac Imaging Studies:  EXAM: XR CHEST 2 VW  LOCATION: [facility]  DATE/TIME: 8/24/2021 4:29 PM  INDICATION: cp  COMPARISON: 1/6/2020                                                           IMPRESSION: No change. Minimal linear scarring within right middle lobe, lungs otherwise clear. Heart size and pulmonary vessels are normal    Lab Review   Lab Results   Component Value Date     08/24/2021    CO2 24 08/24/2021    BUN 19 08/24/2021     Lab Results   Component Value Date    WBC 5.9 08/24/2021    HGB 14.4 08/24/2021    HCT 43.5 08/24/2021    MCV 91 08/24/2021     08/24/2021     Lab Results   Component Value Date    CHOL 200 08/18/2020    TRIG 205 08/18/2020    HDL 53 08/18/2020     Lab Results   Component Value Date    TROPONINI 0.05 08/24/2021     No results found for: BNP  Lab Results   Component Value Date    TSH 1.88 02/09/2018       Joshua Balderas MD, MD FACC      This note created using Dragon voice recognition software. Sound alike errors may have escaped editing.

## 2021-08-27 NOTE — PATIENT INSTRUCTIONS
1. Your chest discomfort appears most likely to be related to reflux.  We will check a chemical stress test to make sure it is not coming from your heart.  2. Try Pepcid AC on a daily basis for 1 month.  3. If the stress test looks good, resume regular exercise, targeting 150 minutes weekly.  Cycling may be easier than walking on your knees.

## 2021-08-27 NOTE — LETTER
2021    Denice Carranza  [facility] [address]  [facility] [address]    RE: Candelaria Hewitt       Dear Colleague,    I had the pleasure of seeing Candelaria Hewitt in the [facility].      CARDIOLOGY RAPID ACCESS CLINIC CONSULT NOTE     Assessment/Plan:   1.  Atypical chest pain.  In a 75-year-old woman with hypertension hyperlipidemia and obstructive sleep apnea.  Discomfort appears most likely to be of GI etiology but would favor further evaluation for pharmacologic nuclear stress test to exclude coronary disease as a contributing factor.  2.  Hyper lipidemia on low-dose simvastatin.  She follows with Dr. Carranza on this  3.  Probable GE reflux.  Advised trial of Pepcid AC  4.  Sedentary lifestyle.  If stress test shows no evidence of inducible ischemia, encouraged to resume full exercise without limitations.    Follow up as needed or for abnormalities on stress testing     History of Present Illness:     It is my pleasure to see Candelaria Hewitt at the [facility] RAPID ACCESS clinic for evaluation of atypical chest pain.    Candelaria Hewitt is a 75 year old female with a past medical history of hypertension and hyperlipidemia, obstructive sleep apnea on CPAP, and obesity.  Her  of 51 years  suddenly of a myocardial infarction 5 years ago, she has struggled with that since that time and still grieves, she reports tearfully.  She was exercising regularly doing Pilates until the pandemic, she has not resumed exercise though she is vaccinated.  She has had problems intermittently with reflux symptoms after meals, and has had some relief previously with Pepcid AC on an as-needed basis.  He denies any exertional dyspnea or paroxysmal nocturnal dyspnea.    Couple of days ago she awakened with some stomach upset that was mild.  After eating the discomfort became worse and she presented to the emergency  room for further evaluation.  Troponins and ECGs were within normal limits she was discharged to follow-up.  She had relief of her discomfort after about 6 hours which she attributes to the GI cocktail she was given in the ER.  She has not had recurrent symptoms since that time but is somewhat fearful of exercising.  She is limited her activity somewhat because of osteoarthritis of the knee.    She denies paroxysmal nocturnal dyspnea, palpitations, syncope or near syncopal spells.  She did not monitor her blood pressure.    Past Medical History:     Patient Active Problem List   Diagnosis     Essential hypertension     Family history of malignant neoplasm of gastrointestinal tract     Impaired fasting glucose     Recurrent major depression in remission (H)     Adiposity     Obstructive sleep apnea syndrome     Hyperlipidemia     Varicose veins of both lower extremities with pain     Obesity (BMI 35.0-39.9) with comorbidity (H)       Past Surgical History:     Past Surgical History:   Procedure Laterality Date     ECTOPIC PREGNANCY SURGERY       HYSTERECTOMY         Family History:     Family History   Problem Relation Age of Onset     Hypertension Mother      Kidney Disease Mother      Colon Cancer Father      Alcoholism Father      Diabetes Father      Breast Cancer Sister 68.00     Family history reviewed and is not pertinent to the chief complaint or presenting problem    Social History:    reports that she has quit smoking. Her smoking use included cigarettes. She has quit using smokeless tobacco. She reports current alcohol use of about 1.0 standard drinks of alcohol per week. She reports that she does not use drugs.   Reports loneliness since her 's passing 5 years ago, which she still mourns.  Lives with her son and grandchildren, feels very fortunate    Exercise: Limited since Covid started.  Previously did Pilates, and enjoyed interaction.    Sleep: Started on CPAP for 15-year    Meds:     Current  "Outpatient Medications   Medication Sig Dispense Refill     acetaminophen (TYLENOL) 325 MG tablet Take 1,300 mg by mouth daily        calcium carbonate (OS-HERNANDO) 600 mg calcium (1,500 mg) tablet [CALCIUM CARBONATE (OS-HERNANDO) 600 MG CALCIUM (1,500 MG) TABLET] 2 TABLETS DAILY TWICE DAILY       FLUoxetine (PROZAC) 20 MG capsule [FLUOXETINE (PROZAC) 20 MG CAPSULE] TAKE 1 CAPSULE DAILY 90 capsule 2     glucosamine-chondroitin 500-400 mg cap [GLUCOSAMINE-CHONDROITIN 500-400 MG CAP] Take 1 capsule by mouth 2 (two) times a day.              lisinopriL (PRINIVIL,ZESTRIL) 10 MG tablet [LISINOPRIL (PRINIVIL,ZESTRIL) 10 MG TABLET] TAKE 1 TABLET DAILY 90 tablet 2     MULTIVITAMIN/IRON/FOLIC ACID (COMPLETE WOMEN ORAL) [MULTIVITAMIN/IRON/FOLIC ACID (COMPLETE WOMEN ORAL)] Take 1 tablet by mouth daily.              naproxen sodium 220 mg cap Take 220 mg by mouth daily        nortriptyline (PAMELOR) 10 MG capsule TAKE 1 CAPSULE AT BEDTIME 90 capsule 2     polyethylene glycol-propylene glycol (SYSTANE ULTRA) 0.4-0.3 % SOLN ophthalmic solution Place 1 drop into both eyes every hour as needed for dry eyes       simvastatin (ZOCOR) 10 MG tablet [SIMVASTATIN (ZOCOR) 10 MG TABLET] TAKE 1 TABLET AT BEDTIME 90 tablet 2     triamterene-hydrochlorothiazide (MAXZIDE) 75-50 mg per tablet [TRIAMTERENE-HYDROCHLOROTHIAZIDE (MAXZIDE) 75-50 MG PER TABLET] TAKE 1 TABLET DAILY 90 tablet 2     vitamin D3 (VITAMIN D3) 50 mcg (2000 units) tablet Take 4,000 Units by mouth daily          Allergies:   Haemophilus influenzae, Influenza vaccine tri-sp 09-10 [influenza vaccines], Morphine, and Methocarbamol    Review of Systems:        Positive for shortness of breath, heartburn, joint pain, loss of balance, depression, and easy bruising.  12 point review of systems otherwise negative      Objective:      Physical Exam  90.7 kg (200 lb)  1.613 m (5' 3.5\")  Body mass index is 34.87 kg/m .  BP (!) 140/84 (BP Location: Right arm, Patient Position: Sitting, Cuff Size: " "Adult Large)   Pulse 86   Resp 16   Ht 1.613 m (5' 3.5\")   Wt 90.7 kg (200 lb)   BMI 34.87 kg/m          General Appearance : Pleasant, awake, Alert, No acute distress  HEENT: No Scleral icterus; the mucous membranes were pink and moist.  Conjunctivae not injected  Neck:  No cervical bruits, jugular venous distention, or thyromegaly.  Stout.  Chest: The spine was straight. Chest wall symmetric  Lungs: Respirations unlabored; the lungs are clear to auscultation.  No wheezing   Cardiovascular:   Normal point of maximal impulse.  Auscultation reveals normal first and second heart sounds with no murmurs, rubs, or gallops.  Carotid, radial, and dorsalis pedal pulses are intact and symmetric.    Abdomen: Obese.  No organomegaly, masses, bruits, or tenderness. Bowels sounds are present  Extremities: No edema  Skin: No xanthelasma. Warm, Dry.  Musculoskeletal: No tenderness.  Neurologic: Alert and oriented ×3. Speech is fluent.      EKG (personally reviewed):  24 August 2021: Normal sinus rhythm.  Normal ECG.    Cardiac Imaging Studies:  EXAM: XR CHEST 2 VW  LOCATION: [facility]  DATE/TIME: 8/24/2021 4:29 PM  INDICATION: cp  COMPARISON: 1/6/2020                                                           IMPRESSION: No change. Minimal linear scarring within right middle lobe, lungs otherwise clear. Heart size and pulmonary vessels are normal    Lab Review   Lab Results   Component Value Date     08/24/2021    CO2 24 08/24/2021    BUN 19 08/24/2021     Lab Results   Component Value Date    WBC 5.9 08/24/2021    HGB 14.4 08/24/2021    HCT 43.5 08/24/2021    MCV 91 08/24/2021     08/24/2021     Lab Results   Component Value Date    CHOL 200 08/18/2020    TRIG 205 08/18/2020    HDL 53 08/18/2020     Lab Results   Component Value Date    TROPONINI 0.05 08/24/2021     No results found for: BNP  Lab Results   Component Value Date    TSH 1.88 02/09/2018       Joshua Balderas MD, MD " [facility]      This note created using Dragon voice recognition software. Sound alike errors may have escaped editing.         Thank you for allowing me to participate in the care of your patient.      Sincerely,     Joshua Balderas MD     [facility]  cc:   Jose Hanley MD  [facility] [place] [address]

## 2021-08-30 ENCOUNTER — HOSPITAL ENCOUNTER (OUTPATIENT)
Dept: NUCLEAR MEDICINE | Facility: HOSPITAL | Age: 76
End: 2021-08-30
Attending: INTERNAL MEDICINE
Payer: COMMERCIAL

## 2021-08-30 ENCOUNTER — HOSPITAL ENCOUNTER (OUTPATIENT)
Dept: CARDIOLOGY | Facility: HOSPITAL | Age: 76
End: 2021-08-30
Attending: INTERNAL MEDICINE
Payer: COMMERCIAL

## 2021-08-30 DIAGNOSIS — R07.89 ATYPICAL CHEST PAIN: ICD-10-CM

## 2021-08-30 DIAGNOSIS — R07.89 ATYPICAL CHEST PAIN: Primary | ICD-10-CM

## 2021-08-30 DIAGNOSIS — G47.33 OBSTRUCTIVE SLEEP APNEA SYNDROME: ICD-10-CM

## 2021-08-30 DIAGNOSIS — K21.00 GASTROESOPHAGEAL REFLUX DISEASE WITH ESOPHAGITIS WITHOUT HEMORRHAGE: ICD-10-CM

## 2021-08-30 DIAGNOSIS — E66.01 MORBID OBESITY (H): ICD-10-CM

## 2021-08-30 LAB
CV STRESS CURRENT BP HE: NORMAL
CV STRESS CURRENT HR HE: 100
CV STRESS CURRENT HR HE: 101
CV STRESS CURRENT HR HE: 101
CV STRESS CURRENT HR HE: 102
CV STRESS CURRENT HR HE: 74
CV STRESS CURRENT HR HE: 78
CV STRESS CURRENT HR HE: 82
CV STRESS CURRENT HR HE: 85
CV STRESS CURRENT HR HE: 86
CV STRESS CURRENT HR HE: 88
CV STRESS CURRENT HR HE: 89
CV STRESS CURRENT HR HE: 93
CV STRESS CURRENT HR HE: 96
CV STRESS CURRENT HR HE: 96
CV STRESS DEVIATION TIME HE: NORMAL
CV STRESS ECHO PERCENT HR HE: NORMAL
CV STRESS EXERCISE STAGE HE: NORMAL
CV STRESS FINAL RESTING BP HE: NORMAL
CV STRESS FINAL RESTING HR HE: 88
CV STRESS MAX HR HE: 102
CV STRESS MAX TREADMILL GRADE HE: 0
CV STRESS MAX TREADMILL SPEED HE: 0
CV STRESS PEAK DIA BP HE: NORMAL
CV STRESS PEAK SYS BP HE: NORMAL
CV STRESS PHASE HE: NORMAL
CV STRESS PROTOCOL HE: NORMAL
CV STRESS RESTING PT POSITION HE: NORMAL
CV STRESS ST DEVIATION AMOUNT HE: NORMAL
CV STRESS ST DEVIATION ELEVATION HE: NORMAL
CV STRESS ST EVELATION AMOUNT HE: NORMAL
CV STRESS TEST TYPE HE: NORMAL
CV STRESS TOTAL STAGE TIME MIN 1 HE: NORMAL
RATE PRESSURE PRODUCT: NORMAL
STRESS ECHO BASELINE DIASTOLIC HE: 80
STRESS ECHO BASELINE HR: 75
STRESS ECHO BASELINE SYSTOLIC BP: 168
STRESS ECHO CALCULATED PERCENT HR: 70 %
STRESS ECHO LAST STRESS DIASTOLIC BP: 82
STRESS ECHO LAST STRESS HR: 96
STRESS ECHO LAST STRESS SYSTOLIC BP: 156
STRESS ECHO TARGET HR: 146

## 2021-08-30 PROCEDURE — 93016 CV STRESS TEST SUPVJ ONLY: CPT | Performed by: INTERNAL MEDICINE

## 2021-08-30 PROCEDURE — 78452 HT MUSCLE IMAGE SPECT MULT: CPT | Mod: 26 | Performed by: INTERNAL MEDICINE

## 2021-08-30 PROCEDURE — 250N000011 HC RX IP 250 OP 636: Performed by: INTERNAL MEDICINE

## 2021-08-30 PROCEDURE — 343N000001 HC RX 343: Performed by: INTERNAL MEDICINE

## 2021-08-30 PROCEDURE — A9500 TC99M SESTAMIBI: HCPCS | Performed by: INTERNAL MEDICINE

## 2021-08-30 PROCEDURE — 93018 CV STRESS TEST I&R ONLY: CPT | Performed by: INTERNAL MEDICINE

## 2021-08-30 PROCEDURE — 78452 HT MUSCLE IMAGE SPECT MULT: CPT

## 2021-08-30 PROCEDURE — 93017 CV STRESS TEST TRACING ONLY: CPT

## 2021-08-30 RX ORDER — FAMOTIDINE 20 MG/1
20 TABLET, FILM COATED ORAL DAILY
COMMUNITY

## 2021-08-30 RX ORDER — AMINOPHYLLINE 25 MG/ML
50 INJECTION, SOLUTION INTRAVENOUS
Status: DISCONTINUED | OUTPATIENT
Start: 2021-08-30 | End: 2021-08-30 | Stop reason: HOSPADM

## 2021-08-30 RX ORDER — REGADENOSON 0.08 MG/ML
0.4 INJECTION, SOLUTION INTRAVENOUS ONCE
Status: COMPLETED | OUTPATIENT
Start: 2021-08-30 | End: 2021-08-30

## 2021-08-30 RX ADMIN — REGADENOSON 0.4 MG: 0.08 INJECTION, SOLUTION INTRAVENOUS at 08:30

## 2021-08-30 RX ADMIN — Medication 30.7 MILLICURIE: at 10:10

## 2021-08-30 RX ADMIN — Medication 8 MCI.: at 07:20

## 2021-08-30 NOTE — PROGRESS NOTES
Here for nuclear stress test cardiac evaluation with substernal chest pain and generalized malaise.  Chest pressure lasting over 8 hours and radiated to left shoulder and arm.  Denies chest pain or pressure at this time.  Lorena Donnelly RN  
Palpitations

## 2021-09-08 ENCOUNTER — TRANSFERRED RECORDS (OUTPATIENT)
Dept: HEALTH INFORMATION MANAGEMENT | Facility: CLINIC | Age: 76
End: 2021-09-08

## 2021-09-23 DIAGNOSIS — I10 ESSENTIAL HYPERTENSION: ICD-10-CM

## 2021-09-24 RX ORDER — TRIAMTERENE AND HYDROCHLOROTHIAZIDE 75; 50 MG/1; MG/1
TABLET ORAL
Qty: 90 TABLET | Refills: 0 | Status: SHIPPED | OUTPATIENT
Start: 2021-09-24 | End: 2021-12-15

## 2021-09-24 RX ORDER — LISINOPRIL 10 MG/1
TABLET ORAL
Qty: 90 TABLET | Refills: 0 | Status: SHIPPED | OUTPATIENT
Start: 2021-09-24 | End: 2021-12-24

## 2021-09-24 NOTE — TELEPHONE ENCOUNTER
"Routing refill request to provider for review/approval because:  Labs not current:  BP, Na+    Last Written Prescription Date:  10/28/20  Last Fill Quantity: 90,  # refills: 2   Last office visit provider:  6/2/21     Requested Prescriptions   Pending Prescriptions Disp Refills     lisinopril (ZESTRIL) 10 MG tablet [Pharmacy Med Name: LISINOPRIL TAB 10MG] 90 tablet 0     Sig: TAKE 1 TABLET DAILY       ACE Inhibitors (Including Combos) Protocol Failed - 9/23/2021  7:03 PM        Failed - Blood pressure under 140/90 in past 12 months     BP Readings from Last 3 Encounters:   08/27/21 (!) 140/84   08/24/21 (!) 152/75   06/02/21 130/80                 Passed - Recent (12 mo) or future (30 days) visit within the authorizing provider's specialty     Patient has had an office visit with the authorizing provider or a provider within the authorizing providers department within the previous 12 mos or has a future within next 30 days. See \"Patient Info\" tab in inbasket, or \"Choose Columns\" in Meds & Orders section of the refill encounter.              Passed - Medication is active on med list        Passed - Patient is age 18 or older        Passed - No active pregnancy on record        Passed - Normal serum creatinine on file in past 12 months     Recent Labs   Lab Test 08/24/21  1606   CR 0.72       Ok to refill medication if creatinine is low          Passed - Normal serum potassium on file in past 12 months     Recent Labs   Lab Test 08/24/21  1606   POTASSIUM 3.5             Passed - No positive pregnancy test within past 12 months           triamterene-HCTZ (MAXZIDE) 75-50 MG tablet [Pharmacy Med Name: TRIAMT/HCTZ  TAB 75-50MG] 90 tablet 0     Sig: TAKE 1 TABLET DAILY       Diuretics (Including Combos) Protocol Failed - 9/23/2021  7:03 PM        Failed - Blood pressure under 140/90 in past 12 months     BP Readings from Last 3 Encounters:   08/27/21 (!) 140/84   08/24/21 (!) 152/75   06/02/21 130/80                  " "Failed - Normal serum sodium on file in past 12 months     Recent Labs   Lab Test 08/24/21  1606   *              Passed - Recent (12 mo) or future (30 days) visit within the authorizing provider's specialty     Patient has had an office visit with the authorizing provider or a provider within the authorizing providers department within the previous 12 mos or has a future within next 30 days. See \"Patient Info\" tab in inbasket, or \"Choose Columns\" in Meds & Orders section of the refill encounter.              Passed - Medication is active on med list        Passed - Patient is age 18 or older        Passed - No active pregancy on record        Passed - Normal serum creatinine on file in past 12 months     Recent Labs   Lab Test 08/24/21  1606   CR 0.72              Passed - Normal serum potassium on file in past 12 months     Recent Labs   Lab Test 08/24/21  1606   POTASSIUM 3.5                    Passed - No positive pregnancy test in past 12 months             phan kramer RN 09/23/21 7:12 PM  "

## 2021-10-10 ENCOUNTER — HEALTH MAINTENANCE LETTER (OUTPATIENT)
Age: 76
End: 2021-10-10

## 2021-10-15 ENCOUNTER — ANCILLARY PROCEDURE (OUTPATIENT)
Dept: MAMMOGRAPHY | Facility: CLINIC | Age: 76
End: 2021-10-15
Attending: FAMILY MEDICINE
Payer: COMMERCIAL

## 2021-10-15 DIAGNOSIS — Z12.31 VISIT FOR SCREENING MAMMOGRAM: ICD-10-CM

## 2021-10-15 PROCEDURE — 77063 BREAST TOMOSYNTHESIS BI: CPT

## 2021-11-13 ENCOUNTER — NURSE TRIAGE (OUTPATIENT)
Dept: NURSING | Facility: CLINIC | Age: 76
End: 2021-11-13
Payer: COMMERCIAL

## 2021-11-13 NOTE — TELEPHONE ENCOUNTER
"Has been vaccinated    Has been exposed but just tested negative for covid yesterday.    Currently has no symptoms    Granddaughter tested positive for covid yesterday and she lives with her granddaughter    Patient has an appointment on 11/16/2021 with Dr Carranza and she would like to know if she needs to reschedule or not?    Please advise.    Shira Hurst RN  [place] Nurse Advisor  1:35 PM  11/13/2021    COVID 19 Nurse Triage Plan/Patient Instructions    Please be aware that novel coronavirus (COVID-19) may be circulating in the community. If you develop symptoms such as fever, cough, or SOB or if you have concerns about the presence of another infection including coronavirus (COVID-19), please contact your health care provider or visit [email or web address].     Disposition/Instructions    Additional COVID19 information to add for patients.   How can I protect others?  If you have symptoms (fever, cough, body aches or trouble breathing): Stay home and away from others (self-isolate) until:    At least 10 days have passed since your symptoms started, And     You ve had no fever--and no medicine that reduces fever--for 1 full day (24 hours), And      Your other symptoms have resolved (gotten better).     If you don t have symptoms, but a test showed that you have COVID-19 (you tested positive):    Stay home and away from others (self-isolate). Follow the tips under \"How do I self-isolate?\" below for 10 days (20 days if you have a weak immune system).    You don't need to be retested for COVID-19 before going back to school or work. As long as you're fever-free and feeling better, you can go back to school, work and other activities after waiting the 10 or 20 days.     How do I self-isolate?    Stay in your own room, even for meals. Use your own bathroom if you can.     Stay away from others in your home. No hugging, kissing or shaking hands. No visitors.    Don t go to work, school or anywhere else. "     Clean  high touch  surfaces often (doorknobs, counters, handles, etc.). Use a household cleaning spray or wipes. You ll find a full list on the EPA website:  www.epa.gov/pesticide-registration/list-n-disinfectants-use-against-sars-cov-2.    Cover your mouth and nose with a mask, tissue or washcloth to avoid spreading germs.    Wash your hands and face often. Use soap and water.    Caregivers in these groups are at risk for severe illness due to COVID-19:  o People 65 years and older  o People who live in a nursing home or long-term care facility  o People with chronic disease (lung, heart, cancer, diabetes, kidney, liver, immunologic)  o People who have a weakened immune system, including those who:  - Are in cancer treatment  - Take medicine that weakens the immune system, such as corticosteroids  - Had a bone marrow or organ transplant  - Have an immune deficiency  - Have poorly controlled HIV or AIDS  - Are obese (body mass index of 40 or higher)  - Smoke regularly    Caregivers should wear gloves while washing dishes, handling laundry and cleaning bedrooms and bathrooms.    Use caution when washing and drying laundry: Don t shake dirty laundry, and use the warmest water setting that you can.    For more tips, go to www.cdc.gov/coronavirus/2019-ncov/downloads/10Things.pdf.    How can I take care of myself?  1. Get lots of rest. Drink extra fluids (unless a doctor has told you not to).     2. Take Tylenol (acetaminophen) for fever or pain. If you have liver or kidney problems, ask your family doctor if it s okay to take Tylenol.     Adults can take either:     650 mg (two 325 mg pills) every 4 to 6 hours, or     1,000 mg (two 500 mg pills) every 8 hours as needed.     Note: Don t take more than 3,000 mg in one day.   Acetaminophen is found in many medicines (both prescribed and over-the-counter medicines). Read all labels to be sure you don t take too much.     For children, check the Tylenol bottle for the  right dose. The dose is based on the child s age or weight.    3. If you have other health problems (like cancer, heart failure, an organ transplant or severe kidney disease): Call your specialty clinic if you don t feel better in the next 2 days.    4. Know when to call 911: Emergency warning signs include:    Trouble breathing or shortness of breath    Pain or pressure in the chest that doesn t go away    Feeling confused like you haven t felt before, or not being able to wake up    Bluish-colored lips or face    What are the symptoms of COVID-19?     The most common symptoms are cough, fever and trouble breathing.     Less common symptoms include body aches, chills, diarrhea (loose, watery poops), fatigue (feeling very tired), headache, runny nose, sore throat and loss of smell.    COVID-19 can cause severe coughing (bronchitis) and lung infection (pneumonia).    How does it spread?     The virus may spread when a person coughs or sneezes into the air. The virus can travel about 6 feet this way, and it can live on surfaces.      Common  (household disinfectants) will kill the virus.    Who is at risk?  Anyone can catch COVID-19 if they re around someone who has the virus.    How can others protect themselves?     Stay away from people who have COVID-19 (or symptoms of COVID-19).    Wash hands often with soap and water. Or, use hand  with at least 60% alcohol.    Avoid touching the eyes, nose or mouth.     Wear a face mask when you go out in public, when sick or when caring for a sick person.    Where can I get more information?     [employer] [place]: About COVID-19: [email or web address]    CDC: What to Do If You re Sick: www.cdc.gov/coronavirus/2019-ncov/about/steps-when-sick.html    CDC: Ending Home Isolation: www.cdc.gov/coronavirus/2019-ncov/hcp/disposition-in-home-patients.html     CDC: Caring for Someone: www.cdc.gov/coronavirus/2019-ncov/if-you-are-sick/care-for-someone.html      [facility]: Interim Guidance for Hospital Discharge to Home: www.[facility]./diseases/coronavirus/hcp/hospdischarge.pdf    [facility] clinical trials (COVID-19 research studies): clinicalaffairs.[facility]-clinical-trials     Below are the COVID-19 hotlines at the [place] [facility] ([facility]). Interpreters are available.   o For health questions: Call 127-490-7825 or 1-593.353.5496 (7 a.m. to 7 p.m.)  o For questions about schools and childcare: Call 545-440-2897 or 1-397.228.3344 (7 a.m. to 7 p.m.)          Thank you for taking steps to prevent the spread of this virus.  o Limit your contact with others.  o Wear a simple mask to cover your cough.  o Wash your hands well and often.    Resources     [employer] [place]: About COVID-19: [email or web address]    CDC: What to Do If You're Sick: www.cdc.gov/coronavirus/2019-ncov/about/steps-when-sick.html    CDC: Ending Home Isolation: www.cdc.gov/coronavirus/2019-ncov/hcp/disposition-in-home-patients.html     CDC: Caring for Someone: www.cdc.gov/coronavirus/2019-ncov/if-you-are-sick/care-for-someone.html     [facility]: Interim Guidance for Hospital Discharge to Home: www.[facility]./diseases/coronavirus/hcp/hospdischarge.pdf    [facility] clinical trials (COVID-19 research studies): clinicalaffairs.[facility]/n-clinical-trials     Below are the COVID-19 hotlines at the [place] [facility] ([facility]). Interpreters are available.   o For health questions: Call 271-891-2491 or 1-202.625.8275 (7 a.m. to 7 p.m.)  o For questions about schools and childcare: Call 652-869-6146 or 1-395.325.5880 (7 a.m. to 7 p.m.)                     Reason for Disposition    [1] CLOSE CONTACT COVID-19 EXPOSURE within last 14 days AND [2] NO symptoms    Protocols used: CORONAVIRUS (COVID-19) EXPOSURE-A- 8.25.2021

## 2021-11-16 ENCOUNTER — OFFICE VISIT (OUTPATIENT)
Dept: FAMILY MEDICINE | Facility: CLINIC | Age: 76
End: 2021-11-16
Payer: COMMERCIAL

## 2021-11-16 VITALS
SYSTOLIC BLOOD PRESSURE: 132 MMHG | DIASTOLIC BLOOD PRESSURE: 82 MMHG | HEIGHT: 64 IN | RESPIRATION RATE: 16 BRPM | WEIGHT: 195.6 LBS | HEART RATE: 84 BPM | BODY MASS INDEX: 33.39 KG/M2

## 2021-11-16 DIAGNOSIS — I10 ESSENTIAL HYPERTENSION: ICD-10-CM

## 2021-11-16 DIAGNOSIS — Z20.822 EXPOSURE TO 2019 NOVEL CORONAVIRUS: ICD-10-CM

## 2021-11-16 DIAGNOSIS — Z00.00 ENCOUNTER FOR MEDICARE ANNUAL WELLNESS EXAM: Primary | ICD-10-CM

## 2021-11-16 DIAGNOSIS — F33.40 RECURRENT MAJOR DEPRESSION IN REMISSION (H): ICD-10-CM

## 2021-11-16 DIAGNOSIS — K21.00 GASTROESOPHAGEAL REFLUX DISEASE WITH ESOPHAGITIS WITHOUT HEMORRHAGE: ICD-10-CM

## 2021-11-16 DIAGNOSIS — E66.01 MORBID OBESITY (H): ICD-10-CM

## 2021-11-16 DIAGNOSIS — G62.9 PERIPHERAL POLYNEUROPATHY: ICD-10-CM

## 2021-11-16 DIAGNOSIS — G47.33 OSA (OBSTRUCTIVE SLEEP APNEA): ICD-10-CM

## 2021-11-16 DIAGNOSIS — R73.03 PREDIABETES: ICD-10-CM

## 2021-11-16 DIAGNOSIS — E78.00 PURE HYPERCHOLESTEROLEMIA: ICD-10-CM

## 2021-11-16 LAB
ALBUMIN SERPL-MCNC: 4.3 G/DL (ref 3.5–5)
ALP SERPL-CCNC: 97 U/L (ref 45–120)
ALT SERPL W P-5'-P-CCNC: 51 U/L (ref 0–45)
ANION GAP SERPL CALCULATED.3IONS-SCNC: 15 MMOL/L (ref 5–18)
AST SERPL W P-5'-P-CCNC: 41 U/L (ref 0–40)
BILIRUB SERPL-MCNC: 0.9 MG/DL (ref 0–1)
BUN SERPL-MCNC: 16 MG/DL (ref 8–28)
CALCIUM SERPL-MCNC: 9.9 MG/DL (ref 8.5–10.5)
CHLORIDE BLD-SCNC: 100 MMOL/L (ref 98–107)
CHOLEST SERPL-MCNC: 190 MG/DL
CO2 SERPL-SCNC: 19 MMOL/L (ref 22–31)
CREAT SERPL-MCNC: 0.72 MG/DL (ref 0.6–1.1)
ERYTHROCYTE [DISTWIDTH] IN BLOOD BY AUTOMATED COUNT: 12.8 % (ref 10–15)
FASTING STATUS PATIENT QL REPORTED: YES
GFR SERPL CREATININE-BSD FRML MDRD: 82 ML/MIN/1.73M2
GLUCOSE BLD-MCNC: 102 MG/DL (ref 70–125)
HCT VFR BLD AUTO: 45.4 % (ref 35–47)
HDLC SERPL-MCNC: 54 MG/DL
HGB BLD-MCNC: 15.1 G/DL (ref 11.7–15.7)
LDLC SERPL CALC-MCNC: 99 MG/DL
MCH RBC QN AUTO: 29.7 PG (ref 26.5–33)
MCHC RBC AUTO-ENTMCNC: 33.3 G/DL (ref 31.5–36.5)
MCV RBC AUTO: 89 FL (ref 78–100)
PLATELET # BLD AUTO: 231 10E3/UL (ref 150–450)
POTASSIUM BLD-SCNC: 4.4 MMOL/L (ref 3.5–5)
PROT SERPL-MCNC: 7.2 G/DL (ref 6–8)
RBC # BLD AUTO: 5.08 10E6/UL (ref 3.8–5.2)
SODIUM SERPL-SCNC: 134 MMOL/L (ref 136–145)
TRIGL SERPL-MCNC: 185 MG/DL
WBC # BLD AUTO: 4.8 10E3/UL (ref 4–11)

## 2021-11-16 PROCEDURE — 99213 OFFICE O/P EST LOW 20 MIN: CPT | Mod: 25 | Performed by: FAMILY MEDICINE

## 2021-11-16 PROCEDURE — 80061 LIPID PANEL: CPT | Performed by: FAMILY MEDICINE

## 2021-11-16 PROCEDURE — 99397 PER PM REEVAL EST PAT 65+ YR: CPT | Performed by: FAMILY MEDICINE

## 2021-11-16 PROCEDURE — 80053 COMPREHEN METABOLIC PANEL: CPT | Performed by: FAMILY MEDICINE

## 2021-11-16 PROCEDURE — 83036 HEMOGLOBIN GLYCOSYLATED A1C: CPT | Performed by: FAMILY MEDICINE

## 2021-11-16 PROCEDURE — 85027 COMPLETE CBC AUTOMATED: CPT | Performed by: FAMILY MEDICINE

## 2021-11-16 PROCEDURE — U0003 INFECTIOUS AGENT DETECTION BY NUCLEIC ACID (DNA OR RNA); SEVERE ACUTE RESPIRATORY SYNDROME CORONAVIRUS 2 (SARS-COV-2) (CORONAVIRUS DISEASE [COVID-19]), AMPLIFIED PROBE TECHNIQUE, MAKING USE OF HIGH THROUGHPUT TECHNOLOGIES AS DESCRIBED BY CMS-2020-01-R: HCPCS | Performed by: FAMILY MEDICINE

## 2021-11-16 PROCEDURE — 36415 COLL VENOUS BLD VENIPUNCTURE: CPT | Performed by: FAMILY MEDICINE

## 2021-11-16 RX ORDER — TRIAMCINOLONE ACETONIDE 0.25 MG/G
OINTMENT TOPICAL
COMMUNITY
Start: 2021-09-08 | End: 2023-07-26

## 2021-11-16 ASSESSMENT — ACTIVITIES OF DAILY LIVING (ADL): CURRENT_FUNCTION: NO ASSISTANCE NEEDED

## 2021-11-16 ASSESSMENT — MIFFLIN-ST. JEOR: SCORE: 1359.3

## 2021-11-16 NOTE — PROGRESS NOTES
"SUBJECTIVE:   Candelaria Hewitt is a 76 year old female who presents for Preventive Visit.      Patient has been advised of split billing requirements and indicates understanding: Yes   Are you in the first 12 months of your Medicare coverage?  No    Healthy Habits:     In general, how would you rate your overall health?  Good    Frequency of exercise:  2-3 days/week    Duration of exercise:  15-30 minutes    Do you usually eat at least 4 servings of fruit and vegetables a day, include whole grains    & fiber and avoid regularly eating high fat or \"junk\" foods?  Yes    Taking medications regularly:  Yes    Medication side effects:  Other    Ability to successfully perform activities of daily living:  No assistance needed    Home Safety:  Lack of grab bars in the bathroom    Hearing Impairment:  Difficulty understanding soft or whispered speech    In the past 6 months, have you been bothered by leaking of urine? Yes    In general, how would you rate your overall mental or emotional health?  Good      PHQ-2 Total Score: 2    Additional concerns today:  Yes        Candelaria presents for her annual wellness exam.  She is having ongoing peripheral neuropathy in her feet.  She feels some numbness and some pins-and-needles.  Sometimes her toes will cramp.  She is looking into some alternative treatment such as acupuncture and some type of radiofrequency.  Is not severe enough that she would want to take another pill like gabapentin, but consider it in the future.  She is doing fairly well from a mood standpoint.  Her   several years ago and she lives with some family members.  She still taking nortriptyline for sleep and fluoxetine for depression.  She did see cardiology after hospitalization for chest pain.  She had a normal nuclear stress test in August.  They thought it was more GI related so increased her Pepcid and that is been helpful.  She would like a Covid test as her granddaughter tested positive about 4 " days ago.  She not having any symptoms.  She did flag for lung cancer screening but quit smoking over 40 years ago so does not meet criteria.  As far as healthcare maintenance, she is up-to-date on everything.  Do you feel safe in your environment? Yes    Have you ever done Advance Care Planning? (For example, a Health Directive, POLST, or a discussion with a medical provider or your loved ones about your wishes): Yes, advance care planning is on file.       Fall risk     click delete button to remove this line now  Cognitive Screening   1) Repeat 3 items (Leader, Season, Table)    2) Clock draw: NORMAL  3) 3 item recall: Recalls 3 objects  Results: 3 items recalled: COGNITIVE IMPAIRMENT LESS LIKELY    Mini-CogTM Copyright S Sukhi. Licensed by the author for use in [facility] [employer]; reprinted with permission (elba@[email or web address]). All rights reserved.      Do you have sleep apnea, excessive snoring or daytime drowsiness?: yes    Reviewed and updated as needed this visit by clinical staff  Tobacco  Allergies  Meds             Reviewed and updated as needed this visit by Provider               Social History     Tobacco Use     Smoking status: Former Smoker     Types: Cigarettes     Smokeless tobacco: Former User   Substance Use Topics     Alcohol use: Yes     Alcohol/week: 1.0 standard drink         Alcohol Use 11/16/2021   Prescreen: >3 drinks/day or >7 drinks/week? No               Current providers sharing in care for this patient include:   Patient Care Team:  Denice Carranza as PCP - General (Family Medicine)  Denice Carranza as Assigned PCP  Joshua Balderas MD as Assigned Heart and Vascular Provider    The following health maintenance items are reviewed in Epic and correct as of today:  Health Maintenance Due   Topic Date Due     ANNUAL REVIEW OF  ORDERS  Never done     DEPRESSION ACTION PLAN  Never done     HEPATITIS C SCREENING  Never done     LUNG CANCER SCREENING  Never done     MEDICARE ANNUAL  "WELLNESS VISIT  08/18/2021     FALL RISK ASSESSMENT  08/18/2021     INFLUENZA VACCINE (1) Never done     COVID-19 Vaccine (3 - Booster for Moderna series) 09/29/2021     PHQ-9  10/06/2021     Lab work is in process      Mammogram Screening - Patient over age 75, has elected to continue with screening.  Pertinent mammograms are reviewed under the imaging tab.    Review of Systems  Constitutional, HEENT, cardiovascular, pulmonary, GI, , musculoskeletal, neuro, skin, endocrine and psych systems are negative, except as otherwise noted.    OBJECTIVE:   /82   Pulse 84   Resp 16   Ht 1.613 m (5' 3.5\")   Wt 88.7 kg (195 lb 9.6 oz)   LMP  (LMP Unknown)   BMI 34.11 kg/m   Estimated body mass index is 34.11 kg/m  as calculated from the following:    Height as of this encounter: 1.613 m (5' 3.5\").    Weight as of this encounter: 88.7 kg (195 lb 9.6 oz).  Physical Exam  GENERAL APPEARANCE: healthy, alert and no distress  EYES: Eyes grossly normal to inspection, PERRL and conjunctivae and sclerae normal  HENT: ear canals and TM's normal, nose and mouth without ulcers or lesions, oropharynx clear and oral mucous membranes moist  NECK: no adenopathy, no asymmetry, masses, or scars and thyroid normal to palpation  RESP: lungs clear to auscultation - no rales, rhonchi or wheezes  BREAST: normal without masses, tenderness or nipple discharge and no palpable axillary masses or adenopathy  CV: regular rate and rhythm, normal S1 S2, no S3 or S4, no murmur, click or rub, no peripheral edema and peripheral pulses strong  ABDOMEN: soft, nontender, no hepatosplenomegaly, no masses and bowel sounds normal  MS: no musculoskeletal defects are noted and gait is age appropriate without ataxia  SKIN: no suspicious lesions or rashes  NEURO: Normal strength and tone, sensory exam grossly normal, mentation intact and speech normal  PSYCH: mentation appears normal and affect normal/bright    Diagnostic Test Results:  Labs reviewed in " Epic  Results for orders placed or performed in visit on 11/16/21 (from the past 24 hour(s))   CBC with platelets   Result Value Ref Range    WBC Count 4.8 4.0 - 11.0 10e3/uL    RBC Count 5.08 3.80 - 5.20 10e6/uL    Hemoglobin 15.1 11.7 - 15.7 g/dL    Hematocrit 45.4 35.0 - 47.0 %    MCV 89 78 - 100 fL    MCH 29.7 26.5 - 33.0 pg    MCHC 33.3 31.5 - 36.5 g/dL    RDW 12.8 10.0 - 15.0 %    Platelet Count 231 150 - 450 10e3/uL       ASSESSMENT / PLAN:   1. Encounter for Medicare annual wellness exam  Candelaria presents for her annual wellness exam.  As far as healthcare maintenance, she had a normal colonoscopy in 2018 and they did not recommend further screening.  She had a normal bone density so is not due until 2024.  She had a mammogram this year.  She is up-to-date on immunizations.    2. Essential hypertension  Under good control with her current medication.    3. Recurrent major depression in remission (H)  Stable with fluoxetine and nortriptyline at night for sleep.    4. Pure hypercholesterolemia  She continues on her statin.  She due for fasting blood work.  She had a normal nuclear stress test August of this year  - CBC with platelets; Future  - Comprehensive metabolic panel; Future  - Lipid panel reflex to direct LDL Fasting; Future  - CBC with platelets  - Comprehensive metabolic panel  - Lipid panel reflex to direct LDL Fasting    5. Obesity (BMI 35.0-39.9) with comorbidity (H)  Weight loss would likely improve hypertension, lipidemia, and sleep apnea.    6. Gastroesophageal reflux disease with esophagitis without hemorrhage  Stable on Pepcid.    7. KYLIE (obstructive sleep apnea)  Think CPAP on a regular basis.    8. Exposure to 2019 novel coronavirus  Exposed by a granddaughter who lives in the household.  - Asymptomatic COVID-19 Virus (Coronavirus) by PCR Nasopharyngeal; Future  - Asymptomatic COVID-19 Virus (Coronavirus) by PCR Nose    9. Prediabetes  She is a history of prediabetes to recheck A1c today.  -  "Hemoglobin A1c; Future  - Hemoglobin A1c    10. Peripheral polyneuropathy  Discussed options for treatments including gabapentin.  She is can try some alternative therapies such as acupuncture.  To let me know if things are worsening.      Patient has been advised of split billing requirements and indicates understanding: Yes  COUNSELING:  Reviewed preventive health counseling, as reflected in patient instructions       Regular exercise       Healthy diet/nutrition    Estimated body mass index is 34.11 kg/m  as calculated from the following:    Height as of this encounter: 1.613 m (5' 3.5\").    Weight as of this encounter: 88.7 kg (195 lb 9.6 oz).    Weight management plan: Discussed healthy diet and exercise guidelines    She reports that she has quit smoking. Her smoking use included cigarettes. She has quit using smokeless tobacco.      Appropriate preventive services were discussed with this patient, including applicable screening as appropriate for cardiovascular disease, diabetes, osteopenia/osteoporosis, and glaucoma.  As appropriate for age/gender, discussed screening for colorectal cancer, prostate cancer, breast cancer, and cervical cancer. Checklist reviewing preventive services available has been given to the patient.    Reviewed patients plan of care and provided an AVS. The Basic Care Plan (routine screening as documented in Health Maintenance) for Candelaria meets the Care Plan requirement. This Care Plan has been established and reviewed with the Patient.    Counseling Resources:  ATP IV Guidelines  Pooled Cohorts Equation Calculator  Breast Cancer Risk Calculator  Breast Cancer: Medication to Reduce Risk  FRAX Risk Assessment  ICSI Preventive Guidelines  Dietary Guidelines for Americans, 2010  USDA's MyPlate  ASA Prophylaxis  Lung CA Screening    Denice Carranza  [facility]    Identified Health Risks:  "

## 2021-11-16 NOTE — PATIENT INSTRUCTIONS
Patient Education   Personalized Prevention Plan  You are due for the preventive services outlined below.  Your care team is available to assist you in scheduling these services.  If you have already completed any of these items, please share that information with your care team to update in your medical record.  Health Maintenance Due   Topic Date Due     ANNUAL REVIEW OF HM ORDERS  Never done     Depression Action Plan  Never done     Hepatitis C Screening  Never done     LUNG CANCER SCREENING  Never done     FALL RISK ASSESSMENT  08/18/2021     Flu Vaccine (1) Never done     COVID-19 Vaccine (3 - Booster for Moderna series) 09/29/2021     Depression Assessment  10/06/2021

## 2021-11-16 NOTE — LETTER
November 17, 2021      Candelaria Hewitt  [address] [place] [address]        Dear ,    We are writing to inform you of your test results.    Your covid was negative.  you are still in the prediabetes range. Otherwise labs look good.    Resulted Orders   Asymptomatic COVID-19 Virus (Coronavirus) by PCR Nose   Result Value Ref Range    SARS CoV2 PCR  Negative, Testing sent to reference lab. Results will be returned via unsolicited result     Testing sent to reference lab. Results will be returned via unsolicited result   CBC with platelets   Result Value Ref Range    WBC Count 4.8 4.0 - 11.0 10e3/uL    RBC Count 5.08 3.80 - 5.20 10e6/uL    Hemoglobin 15.1 11.7 - 15.7 g/dL    Hematocrit 45.4 35.0 - 47.0 %    MCV 89 78 - 100 fL    MCH 29.7 26.5 - 33.0 pg    MCHC 33.3 31.5 - 36.5 g/dL    RDW 12.8 10.0 - 15.0 %    Platelet Count 231 150 - 450 10e3/uL   Comprehensive metabolic panel   Result Value Ref Range    Sodium 134 (L) 136 - 145 mmol/L    Potassium 4.4 3.5 - 5.0 mmol/L    Chloride 100 98 - 107 mmol/L    Carbon Dioxide (CO2) 19 (L) 22 - 31 mmol/L    Anion Gap 15 5 - 18 mmol/L    Urea Nitrogen 16 8 - 28 mg/dL    Creatinine 0.72 0.60 - 1.10 mg/dL    Calcium 9.9 8.5 - 10.5 mg/dL    Glucose 102 70 - 125 mg/dL    Alkaline Phosphatase 97 45 - 120 U/L    AST 41 (H) 0 - 40 U/L    ALT 51 (H) 0 - 45 U/L    Protein Total 7.2 6.0 - 8.0 g/dL    Albumin 4.3 3.5 - 5.0 g/dL    Bilirubin Total 0.9 0.0 - 1.0 mg/dL    GFR Estimate 82 >60 mL/min/1.73m2      Comment:      As of July 11, 2021, eGFR is calculated by the CKD-EPI creatinine equation, without race adjustment. eGFR can be influenced by muscle mass, exercise, and diet. The reported eGFR is an estimation only and is only applicable if the renal function is stable.   Hemoglobin A1c   Result Value Ref Range    Hemoglobin A1C 6.1 (H) <=5.6 %      Comment:        Prediabetes: 5.7 to 6.4%        Diabetes:  >=6.5%     Patients with Hgb F >5%, total bilirubin >10.0  mg/dL, abnormal red cell turnover, severe renal or hepatic disease or malignancy should not have this A1C method used to diagnose or monitor diabetes.    Lipid panel reflex to direct LDL Fasting   Result Value Ref Range    Cholesterol 190 <=199 mg/dL    Triglycerides 185 (H) <=149 mg/dL    Direct Measure HDL 54 >=50 mg/dL      Comment:      HDL Cholesterol Reference Range:     0-2 years:   No reference ranges established for patients under 2 years old  at [facility] for lipid analytes.    2-8 years:  Greater than 45 mg/dL     18 years and older:   Female: Greater than or equal to 50 mg/dL   Male:   Greater than or equal to 40 mg/dL    LDL Cholesterol Calculated 99 <=129 mg/dL    Patient Fasting > 8hrs? Yes    Asymptomatic COVID-19 Virus (Coronavirus) by PCR Nose   Result Value Ref Range    COVID-19 Virus PCR - Result NOT DETECTED       Comment:      Not Detected    Collection of multiple specimens from the same patient may   be necessary to detect the virus. The possibility of a false   negative should be considered if the patient's recent   exposure or clinical presentation suggests 2019 nCOV   infection and diagnostic tests for other causes of illness   are negative. Repeat testing may be considered in this   setting.    Patient sample was heat inactivated and amplified using the   HDPCR(TM) SARS-CoV-2 assay ([employer] The HDPCRTM   SARS-CoV-2 assay is a reverse transcription real-time   polymerase chain reaction (qRT-PCR) test intended for the   qualitative detection of nucleic acid from SARS-CoV-2 in   human nasopharyngeal swabs, oropharyngeal swabs, anterior   nasal swabs, mid-turbinate nasal swabs as well as nasal   aspirate, nasal wash, and bronchoalveolar lavage (BAL)   specimens from individuals who are suspected of COVID-19 by   their healthcare provider.    A negative result does not rule out the presence of    real-time PCR inhibitors in the specimen or COVID-19 RNA in    concentrations below the limit of detection of the assay.   The possibility of a false negative should be considered if   the patients recent exposure or clinical presentation   suggests COVID-19. Additional testing or repeat testing   requires consultation with the laboratory.    Nasopharyngeal specimen is the preferred choice for   swab-based SARS CoV2 testing. When collection of a   nasopharyngeal swab is not possible the following are   acceptable alternatives:  an oropharyngeal (OP) specimen collected by a healthcare   professional, or nasal mid-turbinate (NMT) swab collected by   a healthcare professional or by onsite self-collection   (using a flocked tapered swab), or an anterior nares   specimen collected by a healthcare professional or by onsite   self-collection (using a round foam swab). ([facility])    Testing performed by [facility] at   the [facility] [address] [place] [address].    The test performance characteristics were determined by   [facility]. It has not been cleared or approved by the FDA.    The laboratory is regulated under the Clinical Laboratory   Improvement Amendments of 1988 (CLIA-88) as qualified to   perform high-complexity testing. This test is used for   clinical purposes. It should not be regarded as   investigational or for research.       If you have any questions or concerns, please call the clinic at the number listed above.       Sincerely,      Denice Carranza

## 2021-11-17 ENCOUNTER — TELEPHONE (OUTPATIENT)
Dept: FAMILY MEDICINE | Facility: CLINIC | Age: 76
End: 2021-11-17

## 2021-11-17 LAB
HBA1C MFR BLD: 6.1 %
SARS-COV-2 RNA RESP QL NAA+PROBE: NORMAL
SARS-COV-2 RNA RESP QL NAA+PROBE: NOT DETECTED

## 2021-11-17 ASSESSMENT — PATIENT HEALTH QUESTIONNAIRE - PHQ9: SUM OF ALL RESPONSES TO PHQ QUESTIONS 1-9: 6

## 2021-11-17 NOTE — TELEPHONE ENCOUNTER
Patient notified of results.  Will print and mail letter.  She states she has lost about 5 pounds. Is there anything else you can recommend for her to help lower this further. States can send the info in the mail with letter/call her back.   She has not gotten back into her mychart. Offered to reset password, she will call back.

## 2021-11-17 NOTE — TELEPHONE ENCOUNTER
----- Message from Denice Carranza sent at 11/17/2021  3:42 PM CST -----  Can let know that covid was neg.  She is still in the prediabetes range. Otherwise labs look good.

## 2021-11-22 DIAGNOSIS — F33.40 RECURRENT MAJOR DEPRESSION IN REMISSION (H): ICD-10-CM

## 2021-11-22 RX ORDER — NORTRIPTYLINE HCL 10 MG
CAPSULE ORAL
Qty: 90 CAPSULE | Refills: 2 | Status: SHIPPED | OUTPATIENT
Start: 2021-11-22 | End: 2022-08-22

## 2021-11-22 NOTE — TELEPHONE ENCOUNTER
You can ask her preference - at that low dose, should not have withdrawal so safe not to take it, but could send small amount to local pharmacy if needed.  Please respond with detailed message about preference and where to send

## 2021-11-22 NOTE — TELEPHONE ENCOUNTER
Patient calling to let us know that her prescription wasn't mailed to her.  She is completely out and the last time she took it was 11/20.  The pharmacy said they would mail her some more but she prob wont get any until 11/24 but they cannot promise.  Please call patient to let her know what to do.  Can she wait for it?  Or should she get a new prescription?  Ok to leave a detailed message    nortriptyline (PAMELOR) 10 MG capsule

## 2021-12-06 ENCOUNTER — NURSE TRIAGE (OUTPATIENT)
Dept: NURSING | Facility: CLINIC | Age: 76
End: 2021-12-06
Payer: COMMERCIAL

## 2021-12-06 ENCOUNTER — APPOINTMENT (OUTPATIENT)
Dept: CT IMAGING | Facility: HOSPITAL | Age: 76
End: 2021-12-06
Attending: EMERGENCY MEDICINE
Payer: COMMERCIAL

## 2021-12-06 ENCOUNTER — HOSPITAL ENCOUNTER (EMERGENCY)
Facility: HOSPITAL | Age: 76
Discharge: HOME | End: 2021-12-06
Attending: EMERGENCY MEDICINE | Admitting: EMERGENCY MEDICINE
Payer: COMMERCIAL

## 2021-12-06 VITALS
TEMPERATURE: 98.7 F | SYSTOLIC BLOOD PRESSURE: 163 MMHG | HEART RATE: 94 BPM | WEIGHT: 195 LBS | RESPIRATION RATE: 18 BRPM | DIASTOLIC BLOOD PRESSURE: 87 MMHG | OXYGEN SATURATION: 95 % | BODY MASS INDEX: 34 KG/M2

## 2021-12-06 DIAGNOSIS — R10.9 ABDOMINAL CRAMPING: ICD-10-CM

## 2021-12-06 DIAGNOSIS — K52.9 COLITIS: ICD-10-CM

## 2021-12-06 DIAGNOSIS — K92.1 BLOODY STOOL: ICD-10-CM

## 2021-12-06 DIAGNOSIS — N28.89 RENAL MASS: ICD-10-CM

## 2021-12-06 LAB
ALBUMIN SERPL-MCNC: 4.4 G/DL (ref 3.5–5)
ALP SERPL-CCNC: 93 U/L (ref 45–120)
ALT SERPL W P-5'-P-CCNC: 43 U/L (ref 0–45)
ANION GAP SERPL CALCULATED.3IONS-SCNC: 11 MMOL/L (ref 5–18)
AST SERPL W P-5'-P-CCNC: 28 U/L (ref 0–40)
BASOPHILS # BLD AUTO: 0 10E3/UL (ref 0–0.2)
BASOPHILS NFR BLD AUTO: 0 %
BILIRUB DIRECT SERPL-MCNC: 0.2 MG/DL
BILIRUB SERPL-MCNC: 0.6 MG/DL (ref 0–1)
BUN SERPL-MCNC: 18 MG/DL (ref 8–28)
CALCIUM SERPL-MCNC: 9.8 MG/DL (ref 8.5–10.5)
CHLORIDE BLD-SCNC: 99 MMOL/L (ref 98–107)
CO2 SERPL-SCNC: 22 MMOL/L (ref 22–31)
CREAT SERPL-MCNC: 0.72 MG/DL (ref 0.6–1.1)
EOSINOPHIL # BLD AUTO: 0 10E3/UL (ref 0–0.7)
EOSINOPHIL NFR BLD AUTO: 0 %
ERYTHROCYTE [DISTWIDTH] IN BLOOD BY AUTOMATED COUNT: 12.9 % (ref 10–15)
GFR SERPL CREATININE-BSD FRML MDRD: 82 ML/MIN/1.73M2
GLUCOSE BLD-MCNC: 148 MG/DL (ref 70–125)
HCT VFR BLD AUTO: 46.8 % (ref 35–47)
HGB BLD-MCNC: 15.9 G/DL (ref 11.7–15.7)
IMM GRANULOCYTES # BLD: 0 10E3/UL
IMM GRANULOCYTES NFR BLD: 0 %
LACTATE SERPL-SCNC: 2 MMOL/L (ref 0.7–2)
LIPASE SERPL-CCNC: 25 U/L (ref 0–52)
LYMPHOCYTES # BLD AUTO: 1.1 10E3/UL (ref 0.8–5.3)
LYMPHOCYTES NFR BLD AUTO: 9 %
MCH RBC QN AUTO: 30.5 PG (ref 26.5–33)
MCHC RBC AUTO-ENTMCNC: 34 G/DL (ref 31.5–36.5)
MCV RBC AUTO: 90 FL (ref 78–100)
MONOCYTES # BLD AUTO: 0.5 10E3/UL (ref 0–1.3)
MONOCYTES NFR BLD AUTO: 4 %
NEUTROPHILS # BLD AUTO: 11 10E3/UL (ref 1.6–8.3)
NEUTROPHILS NFR BLD AUTO: 87 %
NRBC # BLD AUTO: 0 10E3/UL
NRBC BLD AUTO-RTO: 0 /100
PLATELET # BLD AUTO: 253 10E3/UL (ref 150–450)
POTASSIUM BLD-SCNC: 3.8 MMOL/L (ref 3.5–5)
PROT SERPL-MCNC: 7.8 G/DL (ref 6–8)
RBC # BLD AUTO: 5.22 10E6/UL (ref 3.8–5.2)
SODIUM SERPL-SCNC: 132 MMOL/L (ref 136–145)
WBC # BLD AUTO: 12.8 10E3/UL (ref 4–11)

## 2021-12-06 PROCEDURE — 36415 COLL VENOUS BLD VENIPUNCTURE: CPT | Performed by: STUDENT IN AN ORGANIZED HEALTH CARE EDUCATION/TRAINING PROGRAM

## 2021-12-06 PROCEDURE — 258N000003 HC RX IP 258 OP 636: Performed by: EMERGENCY MEDICINE

## 2021-12-06 PROCEDURE — 83690 ASSAY OF LIPASE: CPT | Performed by: EMERGENCY MEDICINE

## 2021-12-06 PROCEDURE — 83605 ASSAY OF LACTIC ACID: CPT | Performed by: EMERGENCY MEDICINE

## 2021-12-06 PROCEDURE — 250N000011 HC RX IP 250 OP 636: Performed by: EMERGENCY MEDICINE

## 2021-12-06 PROCEDURE — 85025 COMPLETE CBC W/AUTO DIFF WBC: CPT | Performed by: EMERGENCY MEDICINE

## 2021-12-06 PROCEDURE — 96360 HYDRATION IV INFUSION INIT: CPT | Mod: 59

## 2021-12-06 PROCEDURE — 99285 EMERGENCY DEPT VISIT HI MDM: CPT | Mod: 25

## 2021-12-06 PROCEDURE — 80053 COMPREHEN METABOLIC PANEL: CPT | Performed by: EMERGENCY MEDICINE

## 2021-12-06 PROCEDURE — 36415 COLL VENOUS BLD VENIPUNCTURE: CPT | Performed by: EMERGENCY MEDICINE

## 2021-12-06 PROCEDURE — 74177 CT ABD & PELVIS W/CONTRAST: CPT

## 2021-12-06 RX ORDER — IOPAMIDOL 755 MG/ML
100 INJECTION, SOLUTION INTRAVASCULAR ONCE
Status: COMPLETED | OUTPATIENT
Start: 2021-12-06 | End: 2021-12-06

## 2021-12-06 RX ORDER — METRONIDAZOLE 500 MG/1
500 TABLET ORAL 3 TIMES DAILY
Qty: 15 TABLET | Refills: 0 | Status: SHIPPED | OUTPATIENT
Start: 2021-12-06 | End: 2021-12-11

## 2021-12-06 RX ORDER — CIPROFLOXACIN 500 MG/1
500 TABLET, FILM COATED ORAL 2 TIMES DAILY
Qty: 10 TABLET | Refills: 0 | Status: SHIPPED | OUTPATIENT
Start: 2021-12-06 | End: 2021-12-11

## 2021-12-06 RX ADMIN — IOPAMIDOL 100 ML: 755 INJECTION, SOLUTION INTRAVENOUS at 12:32

## 2021-12-06 RX ADMIN — SODIUM CHLORIDE 500 ML: 9 INJECTION, SOLUTION INTRAVENOUS at 14:55

## 2021-12-06 ASSESSMENT — ENCOUNTER SYMPTOMS
DIAPHORESIS: 1
SHORTNESS OF BREATH: 0
HEMATURIA: 0
ABDOMINAL PAIN: 1
BLOOD IN STOOL: 1
COUGH: 0
DIARRHEA: 1
DYSURIA: 0
VOMITING: 0

## 2021-12-06 NOTE — DISCHARGE INSTRUCTIONS
Take the antibiotic as prescribed and until gone.    Call today make an appointment to follow-up with primary care doctor for Thursday or Friday this week for reevaluation.  The GI specialist (Dr. Marroquin) would like to have colonoscopy in about 4-6 weeks after your symptoms have completely resolved.  Your primary care doctor can help arrange that.    The CT scan does show a left kidney mass - this should be looked at by your primary care doctor. Talk to your doctor this week about this. You will need a renal ultrasound scan to look into this.    Return emergency department if you have worsening rectal bleeding, worsening abdominal pain, fever, or any other concerns.    Thank you for choosing [facility].  It has been my pleasure caring for you today.     ~Dr. Arlin MD

## 2021-12-06 NOTE — TELEPHONE ENCOUNTER
Triage call:     Patient calling with blood in her stool.   She states she ate soup with  milk last night around 1800. She began to have stomach cramps and diarrhea. Denies fever.   Overnight, she started passing bright red blood without stool. She states this has happened 5-6 times. Denies lightheadedness or clots.     According to the protocol, patient should go to ER now.  Care advice given. Patient verbalizes understanding and agrees with plan of care. She plans on having someone drive her to [facility].     Senia Dowd RN   21 8:50 AM  [facility] Nurse Advisor      Reason for Disposition    MODERATE rectal bleeding (small blood clots, passing blood without stool, or toilet water turns red) more than once a day    Additional Information    Negative: Passed out (i.e., fainted, collapsed and was not responding)    Negative: Shock suspected (e.g., cold/pale/clammy skin, too weak to stand, low BP, rapid pulse)    Negative: Vomiting red blood or black (coffee ground) material    Negative: Sounds like a life-threatening emergency to the triager    Negative: SEVERE dizziness (e.g., unable to stand, requires support to walk, feels like passing out now)    Protocols used: RECTAL BLEEDING-A-OH    COVID 19 Nurse Triage Plan/Patient Instructions    Please be aware that novel coronavirus (COVID-19) may be circulating in the community. If you develop symptoms such as fever, cough, or SOB or if you have concerns about the presence of another infection including coronavirus (COVID-19), please contact your health care provider or visit [email or web address].     Disposition/Instructions    ED Visit recommended. Follow protocol based instructions.     Bring Your Own Device:  Please also bring your smart device(s) (smart phones, tablets, laptops) and their charging cables for your personal use and to communicate with your care team during your visit.    Thank you for taking steps to prevent the  spread of this virus.  o Limit your contact with others.  o Wear a simple mask to cover your cough.  o Wash your hands well and often.    Resources    [facility]: About COVID-19: [email or web address]    CDC: What to Do If You're Sick: www.cdc.gov/coronavirus/2019-ncov/about/steps-when-sick.html    CDC: Ending Home Isolation: www.cdc.gov/coronavirus/2019-ncov/hcp/disposition-in-home-patients.html     CDC: Caring for Someone: www.cdc.gov/coronavirus/2019-ncov/if-you-are-sick/care-for-someone.html     [facility]: Interim Guidance for Hospital Discharge to Home: www.[facility].Count includes the [facility].[place]/diseases/coronavirus/hcp/hospdischarge.pdf    [facility] clinical trials (COVID-19 research studies): clinicalaffairs.[facility]-clinical-trials     Below are the COVID-19 hotlines at the [place] [facility] ([facility]). Interpreters are available.   o For health questions: Call 417-163-4688 or 1-494.620.2392 (7 a.m. to 7 p.m.)  o For questions about schools and childcare: Call 125-764-0197 or 1-444.652.2441 (7 a.m. to 7 p.m.)

## 2021-12-06 NOTE — ED PROVIDER NOTES
EMERGENCY DEPARTMENT ENCOUNTER      NAME: Candelaria Hewitt  AGE: 76 year old female  YOB: 1945  MRN: [id number]  EVALUATION DATE & TIME: No admission date for patient encounter.    PCP: Denice Carranza    ED PROVIDER: Shira Oliveros M.D.        Chief Complaint   Patient presents with     Abdominal Pain         FINAL IMPRESSION:    1. Abdominal cramping    2. Bloody stool    3. Colitis    4. Renal mass            MEDICAL DECISION MAKING:    Candelaria Hewitt is a 76 year old female with history of HTN who presents to the ER with complaints of lower abdominal cramping and bloody stools.  Symptoms began about 30 minutes after eating some [place] tomato soup made with whole milk.  Bleeding has since stopped and she has not had any bleeding since coming to the ER.  She has gone to the bathroom without any blood in the toilet or on toilet paper.  Last actual stool was about midnight.  Laboratories look great.  CT scan with findings of colitis, cannot rule out ischemic but lactic acid looks good.  This was discussed with GI who feels this could all be managed as an outpatient on Cipro and Flagyl and outpatient colonoscopy in a few weeks to ensure resolution of all of this.  Patient's abdomen overall is very soft and benign.  No rebound or guarding.  She feels comfortable with discharge home and all of her questions have been answered.      ED COURSE:  10:35 AM  I met with the patient to gather history and perform my exam. ED course and treatment discussed.    1:28 PM  Spoke with triage Rn and she will draw the lactic acid. Will then talk to GI once this results.    2:38 PM  I spoke with Dr. Marroquin of GI.  We discussed the CT scan and laboratory results.  He thinks that there is possible component of ischemic colitis but he thinks that it is best treated as an outpatient at this time and to treat her with Cipro and Flagyl.  She does not have a surgical abdomen.  Overall abdomen is very soft.  She is  hemodynamically stable.  Laboratories look great.  Lactic acid 2.0.  She agrees with the plan for outpatient management and follow-up with GI as an outpatient in about 4 weeks for repeat colonoscopy.  The meantime she will work with primary care.  We will give her a little IV fluids before discharge and prescriptions provided for Cipro and Flagyl.  She is not able to provide any stool sample at this time as she states her diarrhea has stopped.  She states she has not actually passed any stool since about midnight last night.  She has gone to the bathroom again while in the waiting room and did not have any rectal bleeding or blood on the tissue paper when she wiped.  This all seems to be improving.  Patient is also made aware of the renal mass on the left side and the need for outpatient ultrasound.  She will have primary care schedule this.    I do not think that this represents ACS, PE, ruptured AAA, aortic dissection, bowel obstruction, cholecystitis, pancreatitis, appendicitis, diverticulitis, kidney stone, pyelonephritis, incarcerated or strangulated hernia, ovarian torsion, PID, ectopic pregnancy, tubo-ovarian abscess, viscus perforation, perforated GI ulcer, or other such etiologies at this time.      COVID-19 PPE worn during patient evaluation:  Mask: n95 and homemade masks   Eye Protection: goggles   Gown: none  Hair cover: yes  Face shield: yes   Patient wearing a mask: yes    At the conclusion of the encounter I discussed the results of all of the tests and the disposition. Their questions were answered. The patient (and any family present) acknowledged understanding and were agreeable with the care plan.      CONSULTANTS:  NELSON Marroquin        MEDICATIONS GIVEN IN THE EMERGENCY:  Medications   0.9% sodium chloride BOLUS (has no administration in time range)   iopamidol (ISOVUE-370) solution 100 mL (100 mLs Intravenous Given 12/6/21 1232)             NEW PRESCRIPTIONS STARTED AT TODAY'S ER VISIT      Medication List      Started    ciprofloxacin 500 MG tablet  Commonly known as: CIPRO  500 mg, Oral, 2 TIMES DAILY     metroNIDAZOLE 500 MG tablet  Commonly known as: FLAGYL  500 mg, Oral, 3 TIMES DAILY                CONDITION:  Stable, improving        DISPOSITION:  discharge home         =================================================================  =================================================================    HPI    Patient information was obtained from: patient    Use of Intrepreter: N/A     Candelaria Hewitt is a 76 year old female with history of HTN who presents to the ER with complaints of lower abdominal cramping and bloody stools.  For dinner last night she made some [place] tomato soup with whole milk.  About 30 minutes after eating she developed intense lower abdominal cramping.  She had several hours of frequent cramping in stooling.  This morning then she had some more and noted that there was now some blood in the toilet.  She estimates around a tablespoon at the bottom of the toilet bowl.  She has now had a few other episodes of bloody stool since but does seem to be decreasing.    She did have some diaphoresis with abdominal cramping and diarrhea episodes but otherwise no fevers, cough, chest pain, shortness of breath, vomiting, urinary symptoms.  She denies being on any blood thinners.  She has had numerous colonoscopies over the years due to family history of colon cancer have all been negative.      REVIEW OF SYSTEMS  Review of Systems   Constitutional: Positive for diaphoresis (with cramping and stooling episodes).   Respiratory: Negative for cough and shortness of breath.    Cardiovascular: Negative for chest pain.   Gastrointestinal: Positive for abdominal pain (cramping), blood in stool and diarrhea. Negative for vomiting.   Genitourinary: Negative for dysuria and hematuria.   Allergic/Immunologic: Negative for immunocompromised state.   All other systems reviewed and are  negative.        PAST MEDICAL HISTORY:  Past Medical History:   Diagnosis Date     Depression      Hyperlipidemia      Hypertension          PAST SURGICAL HISTORY:  Past Surgical History:   Procedure Laterality Date     ECTOPIC PREGNANCY SURGERY       HYSTERECTOMY           CURRENT MEDICATIONS:    Prior to Admission medications    Medication Sig Start Date End Date Taking? Authorizing Provider   acetaminophen (TYLENOL) 325 MG tablet Take 1,300 mg by mouth daily  2/9/18   Provider, Historical   calcium carbonate (OS-HERNANDO) 600 mg calcium (1,500 mg) tablet [CALCIUM CARBONATE (OS-HERNANDO) 600 MG CALCIUM (1,500 MG) TABLET] 2 TABLETS DAILY TWICE DAILY 10/23/03   Provider, Historical   famotidine (PEPCID) 20 MG tablet Take 20 mg by mouth daily    Reported, Patient   FLUoxetine (PROZAC) 20 MG capsule [FLUOXETINE (PROZAC) 20 MG CAPSULE] TAKE 1 CAPSULE DAILY 10/28/20   Denice Carranza   glucosamine-chondroitin 500-400 mg cap [GLUCOSAMINE-CHONDROITIN 500-400 MG CAP] Take 1 capsule by mouth 2 (two) times a day.        9/30/16   Provider, Historical   lisinopril (ZESTRIL) 10 MG tablet TAKE 1 TABLET DAILY 9/24/21   Denice Carranza   MULTIVITAMIN/IRON/FOLIC ACID (COMPLETE WOMEN ORAL) [MULTIVITAMIN/IRON/FOLIC ACID (COMPLETE WOMEN ORAL)] Take 1 tablet by mouth daily.        9/30/16   Provider, Historical   naproxen sodium 220 mg cap Take 220 mg by mouth daily  2/9/18   Provider, Historical   nortriptyline (PAMELOR) 10 MG capsule TAKE 1 CAPSULE AT BEDTIME 11/22/21   Denice Carranza   polyethylene glycol-propylene glycol (SYSTANE ULTRA) 0.4-0.3 % SOLN ophthalmic solution Place 1 drop into both eyes every hour as needed for dry eyes    Reported, Patient   simvastatin (ZOCOR) 10 MG tablet [SIMVASTATIN (ZOCOR) 10 MG TABLET] TAKE 1 TABLET AT BEDTIME 10/28/20   Denice Carranza   triamcinolone (KENALOG) 0.025 % external ointment 1 APPLICATION TWICE A DAY AS NEEDED TOPICALLY APPLY TO AFFECTED AREAS ON FACE 9/8/21   Reported, Patient   triamterene-HCTZ  (MAXZIDE) 75-50 MG tablet TAKE 1 TABLET DAILY 9/24/21   Denice Carranza   vitamin D3 (VITAMIN D3) 50 mcg (2000 units) tablet Take 4,000 Units by mouth daily  3/29/19   Provider, Historical         ALLERGIES:  Allergies   Allergen Reactions     Haemophilus Influenzae      Other reaction(s): *Unknown     Influenza Vaccine Tri-Sp 09-10 [Influenza Vaccines] Unknown     Leg weakness for months, ? GBS     Morphine Nausea     Tolerate vicodin and codeine     Methocarbamol Rash         FAMILY HISTORY:  Family History   Problem Relation Age of Onset     Hypertension Mother      Kidney Disease Mother      Colon Cancer Father      Alcoholism Father      Diabetes Father      Breast Cancer Sister 68.00         SOCIAL HISTORY:  Social History     Socioeconomic History     Marital status:      Spouse name: Not on file     Number of children: Not on file     Years of education: Not on file     Highest education level: Not on file   Occupational History     Not on file   Tobacco Use     Smoking status: Former Smoker     Types: Cigarettes     Smokeless tobacco: Former User   Substance and Sexual Activity     Alcohol use: Yes     Alcohol/week: 1.0 standard drink     Drug use: No     Sexual activity: Not Currently     Partners: Male   Other Topics Concern     Not on file   Social History Narrative     Not on file     Social Determinants of Health     Financial Resource Strain: Not on file   Food Insecurity: Not on file   Transportation Needs: Not on file   Physical Activity: Not on file   Stress: Not on file   Social Connections: Not on file   Intimate Partner Violence: Not on file   Housing Stability: Not on file         VITALS:  Patient Vitals for the past 24 hrs:   BP Temp Temp src Pulse Resp SpO2 Weight   12/06/21 1147 (!) 163/87 98.7  F (37.1  C) Oral 94 18 95 % --   12/06/21 0951 (!) 149/86 97.9  F (36.6  C) Oral 99 16 95 % 88.5 kg (195 lb)       Wt Readings from Last 3 Encounters:   12/06/21 88.5 kg (195 lb)   11/16/21 88.7  kg (195 lb 9.6 oz)   08/27/21 90.7 kg (200 lb)         PHYSICAL EXAM    Constitutional:  Well developed, Well nourished, NAD, GCS 15  HENT:  Normocephalic, Atraumatic, Bilateral external ears normal,  Nose normal. Neck-  Normal range of motion, No tenderness, Supple, No stridor.   Eyes:  PERRL, EOMI, Conjunctiva normal, No discharge.  Respiratory:  Normal breath sounds, No respiratory distress, No wheezing, Speaks full sentences easily. No cough.   Cardiovascular:  Normal heart rate, Regular rhythm, No murmurs, No rubs, No gallops.  GI:  No excessive obesity.  Bowel sounds normal, Soft, Nild lower abd tenderness, No masses, No flank tenderness. No rebound or guarding. Rectal: deferred as bleeding has stopped per patient  : deferred  Musculoskeletal: 2+ DP pulses. No edema. No cyanosis, No clubbing. Good range of motion in all major joints. No major deformities noted.   Integument:  Warm, Dry, No erythema, No rash.  No petechiae.   Neurologic:  Alert & oriented x 3, No focal deficits noted. Normal gait.   Psychiatric:  Affect normal, Cooperative          LAB:  All pertinent labs reviewed and interpreted.  Recent Results (from the past 24 hour(s))   Basic metabolic panel    Collection Time: 12/06/21 10:03 AM   Result Value Ref Range    Sodium 132 (L) 136 - 145 mmol/L    Potassium 3.8 3.5 - 5.0 mmol/L    Chloride 99 98 - 107 mmol/L    Carbon Dioxide (CO2) 22 22 - 31 mmol/L    Anion Gap 11 5 - 18 mmol/L    Urea Nitrogen 18 8 - 28 mg/dL    Creatinine 0.72 0.60 - 1.10 mg/dL    Calcium 9.8 8.5 - 10.5 mg/dL    Glucose 148 (H) 70 - 125 mg/dL    GFR Estimate 82 >60 mL/min/1.73m2   Hepatic function panel    Collection Time: 12/06/21 10:03 AM   Result Value Ref Range    Bilirubin Total 0.6 0.0 - 1.0 mg/dL    Bilirubin Direct 0.2 <=0.5 mg/dL    Protein Total 7.8 6.0 - 8.0 g/dL    Albumin 4.4 3.5 - 5.0 g/dL    Alkaline Phosphatase 93 45 - 120 U/L    AST 28 0 - 40 U/L    ALT 43 0 - 45 U/L   Lipase    Collection Time: 12/06/21  10:03 AM   Result Value Ref Range    Lipase 25 0 - 52 U/L   CBC with platelets and differential    Collection Time: 12/06/21 10:03 AM   Result Value Ref Range    WBC Count 12.8 (H) 4.0 - 11.0 10e3/uL    RBC Count 5.22 (H) 3.80 - 5.20 10e6/uL    Hemoglobin 15.9 (H) 11.7 - 15.7 g/dL    Hematocrit 46.8 35.0 - 47.0 %    MCV 90 78 - 100 fL    MCH 30.5 26.5 - 33.0 pg    MCHC 34.0 31.5 - 36.5 g/dL    RDW 12.9 10.0 - 15.0 %    Platelet Count 253 150 - 450 10e3/uL    % Neutrophils 87 %    % Lymphocytes 9 %    % Monocytes 4 %    % Eosinophils 0 %    % Basophils 0 %    % Immature Granulocytes 0 %    NRBCs per 100 WBC 0 <1 /100    Absolute Neutrophils 11.0 (H) 1.6 - 8.3 10e3/uL    Absolute Lymphocytes 1.1 0.8 - 5.3 10e3/uL    Absolute Monocytes 0.5 0.0 - 1.3 10e3/uL    Absolute Eosinophils 0.0 0.0 - 0.7 10e3/uL    Absolute Basophils 0.0 0.0 - 0.2 10e3/uL    Absolute Immature Granulocytes 0.0 <=0.4 10e3/uL    Absolute NRBCs 0.0 10e3/uL   Lactic acid whole blood    Collection Time: 12/06/21  1:33 PM   Result Value Ref Range    Lactic Acid 2.0 0.7 - 2.0 mmol/L       No results found for: [facility]        RADIOLOGY:  Reviewed all pertinent imaging. Please see official radiology report.    CT Abdomen Pelvis w Contrast   Final Result   IMPRESSION:    1.  Acute colitis of moderate severity in a contiguous fashion from the distal transverse colon to the proximal sigmoid colon is nonspecific in appearance but could be ischemic given distribution and degree of calcified atheromatous plaque.   2.  Benign 3 cm cyst upper pole left kidney posteriorly requires no follow-up but a slightly hyperdense 0.8 cm mass upper pole left kidney anteriorly should be further evaluated with nonurgent renal ultrasound.   3.  Extensive calcified atheromatous plaque visualized arteries.            EKG:    none      PROCEDURES:  none      IRj, am serving as a scribe to document services personally performed by Dr. Shira Oliveros based on my  observation and the provider's statements to me. I, Dr. Shira Oliveros MD attest that Rj Garcia is acting in a scribe capacity, has observed my performance of the services and has documented them in accordance with my direction.        Shira Oliveros M.D. FACE  Emergency Medicine and Medical Toxicology  [facility] EMERGENCY DEPARTMENT  [address] [place] [address] [phone]  Dept: [phone]           Shira Oliveros MD  12/06/21 3389

## 2021-12-06 NOTE — ED TRIAGE NOTES
Patient presents here for evaluation of diarrhea and abdominal cramping that has persisted since last night. At about midnight, she noticed bright blood in her stool. She also notes that she was constipated about 2-3 days before the onset of her diarrhea.

## 2021-12-09 ENCOUNTER — OFFICE VISIT (OUTPATIENT)
Dept: FAMILY MEDICINE | Facility: CLINIC | Age: 76
End: 2021-12-09
Payer: COMMERCIAL

## 2021-12-09 VITALS
HEART RATE: 96 BPM | OXYGEN SATURATION: 97 % | WEIGHT: 195 LBS | TEMPERATURE: 98 F | BODY MASS INDEX: 33.29 KG/M2 | SYSTOLIC BLOOD PRESSURE: 120 MMHG | HEIGHT: 64 IN | DIASTOLIC BLOOD PRESSURE: 74 MMHG

## 2021-12-09 DIAGNOSIS — N28.89 RENAL MASS OF UNKNOWN NATURE: Primary | ICD-10-CM

## 2021-12-09 DIAGNOSIS — M25.561 CHRONIC PAIN OF BOTH KNEES: ICD-10-CM

## 2021-12-09 DIAGNOSIS — M25.562 CHRONIC PAIN OF BOTH KNEES: ICD-10-CM

## 2021-12-09 DIAGNOSIS — K52.9 COLITIS: ICD-10-CM

## 2021-12-09 DIAGNOSIS — G89.29 CHRONIC PAIN OF BOTH KNEES: ICD-10-CM

## 2021-12-09 PROCEDURE — 99214 OFFICE O/P EST MOD 30 MIN: CPT | Performed by: FAMILY MEDICINE

## 2021-12-09 RX ORDER — CELECOXIB 100 MG/1
100 CAPSULE ORAL 2 TIMES DAILY
Qty: 60 CAPSULE | Refills: 3 | Status: SHIPPED | OUTPATIENT
Start: 2021-12-09 | End: 2022-04-06

## 2021-12-09 ASSESSMENT — MIFFLIN-ST. JEOR: SCORE: 1356.57

## 2021-12-09 NOTE — PROGRESS NOTES
"  Assessment & Plan     Renal mass of unknown nature    - US Kidney Left; Future    Colitis    - Adult Gastro Ref - Procedure Only; Future    Chronic pain of both knees    - celecoxib (CELEBREX) 100 MG capsule; Take 1 capsule (100 mg) by mouth 2 times daily       See Patient Instructions    Return in about 6 weeks (around 1/20/2022) for with consultant as planned.    Mari Greene MD  [facility] EZEKIEL Gaona is a 76 year old who presents for the following health issues: With her Daughter today.    HPI     ED/UC Followup:    Facility:  [facility]  Date of visit: 12/6/21  Reason for visit: Abdominal cramping, bloody stool, colitis, renal mass.   Current Status: headache-possible   tylneol 2 in the am, 3 in pm  Stomach cramping has decreased  Rectal bleeding has stopped.   Last BM was Sunday evening.    Had noticed increased hot flashes recently before ED visit - unsure if related.      *Naproxen stopped by ED provider.   She used this for knee pain, is there an alternative.         Review of Systems   Constitutional, HEENT, cardiovascular, pulmonary, GI, , musculoskeletal, neuro, skin, endocrine and psych systems are negative, except as otherwise noted.      Objective    /74   Pulse 96   Temp 98  F (36.7  C) (Tympanic)   Ht 1.613 m (5' 3.5\")   Wt 88.5 kg (195 lb)   LMP  (LMP Unknown)   SpO2 97%   BMI 34.00 kg/m    Body mass index is 34 kg/m .  Physical Exam   GENERAL: healthy, alert and no distress  ABDOMEN: soft, nontender, without hepatosplenomegaly or masses and bowel sounds normal    Admission on 12/06/2021, Discharged on 12/06/2021   Component Date Value Ref Range Status     Sodium 12/06/2021 132* 136 - 145 mmol/L Final     Potassium 12/06/2021 3.8  3.5 - 5.0 mmol/L Final     Chloride 12/06/2021 99  98 - 107 mmol/L Final     Carbon Dioxide (CO2) 12/06/2021 22  22 - 31 mmol/L Final     Anion Gap 12/06/2021 11  5 - 18 mmol/L Final     Urea Nitrogen " 12/06/2021 18  8 - 28 mg/dL Final     Creatinine 12/06/2021 0.72  0.60 - 1.10 mg/dL Final     Calcium 12/06/2021 9.8  8.5 - 10.5 mg/dL Final     Glucose 12/06/2021 148* 70 - 125 mg/dL Final     GFR Estimate 12/06/2021 82  >60 mL/min/1.73m2 Final    As of July 11, 2021, eGFR is calculated by the CKD-EPI creatinine equation, without race adjustment. eGFR can be influenced by muscle mass, exercise, and diet. The reported eGFR is an estimation only and is only applicable if the renal function is stable.     Bilirubin Total 12/06/2021 0.6  0.0 - 1.0 mg/dL Final     Bilirubin Direct 12/06/2021 0.2  <=0.5 mg/dL Final     Protein Total 12/06/2021 7.8  6.0 - 8.0 g/dL Final     Albumin 12/06/2021 4.4  3.5 - 5.0 g/dL Final     Alkaline Phosphatase 12/06/2021 93  45 - 120 U/L Final     AST 12/06/2021 28  0 - 40 U/L Final     ALT 12/06/2021 43  0 - 45 U/L Final     Lipase 12/06/2021 25  0 - 52 U/L Final     WBC Count 12/06/2021 12.8* 4.0 - 11.0 10e3/uL Final     RBC Count 12/06/2021 5.22* 3.80 - 5.20 10e6/uL Final     Hemoglobin 12/06/2021 15.9* 11.7 - 15.7 g/dL Final     Hematocrit 12/06/2021 46.8  35.0 - 47.0 % Final     MCV 12/06/2021 90  78 - 100 fL Final     MCH 12/06/2021 30.5  26.5 - 33.0 pg Final     MCHC 12/06/2021 34.0  31.5 - 36.5 g/dL Final     RDW 12/06/2021 12.9  10.0 - 15.0 % Final     Platelet Count 12/06/2021 253  150 - 450 10e3/uL Final     % Neutrophils 12/06/2021 87  % Final     % Lymphocytes 12/06/2021 9  % Final     % Monocytes 12/06/2021 4  % Final     % Eosinophils 12/06/2021 0  % Final     % Basophils 12/06/2021 0  % Final     % Immature Granulocytes 12/06/2021 0  % Final     NRBCs per 100 WBC 12/06/2021 0  <1 /100 Final     Absolute Neutrophils 12/06/2021 11.0* 1.6 - 8.3 10e3/uL Final     Absolute Lymphocytes 12/06/2021 1.1  0.8 - 5.3 10e3/uL Final     Absolute Monocytes 12/06/2021 0.5  0.0 - 1.3 10e3/uL Final     Absolute Eosinophils 12/06/2021 0.0  0.0 - 0.7 10e3/uL Final     Absolute Basophils  12/06/2021 0.0  0.0 - 0.2 10e3/uL Final     Absolute Immature Granulocytes 12/06/2021 0.0  <=0.4 10e3/uL Final     Absolute NRBCs 12/06/2021 0.0  10e3/uL Final     Lactic Acid 12/06/2021 2.0  0.7 - 2.0 mmol/L Final       Mari Greene M.D.

## 2021-12-09 NOTE — PATIENT INSTRUCTIONS
Patient Education   Diet for Diverticular Disease  What is diverticular disease?   When the inner wall of your colon weakens and forms small pouches, you have diverticulosis. For most people, this causes no symptoms.   If the pouches become inflamed or infected, you have diverticulitis. Warning signs may include pain in the lower abdomen, chills and vomiting (throwing up).  These conditions are called diverticular disease.  What causes it?  The cause has been linked to many years of eating too little fiber. People who eat plenty of whole grains, fresh fruits and vegetables are less likely to get this disease.   Once the pouches have formed, there is no way to reverse them.   How much fiber should I get?  If you're healing from diverticulitis or surgery to remove the inflamed area, you will at first follow a low-fiber diet (less than 10 grams each day). Then, as your doctor advises, you may slowly add fiber. Increase your intake by 1 to 2 grams a day. Your goal will be 25 to 30 grams a day.   To avoid future problems, continue to get 25 to 30 grams of fiber each day.   How can fiber help?   A high-fiber diet will help you have regular bowel movements. Fiber adds bulk to the stool and helps it pass more easily. Fiber also helps prevent the pouches from growing larger or getting infected.  In the past, doctors have warned patients to avoid eating nuts, seeds and popcorn. They believed these foods could get caught in the pouches and inflame them. But recent studies do not support this idea.   Please ask your dietitian for the handout Fiber Content in [employer]. It will show you how to get more fiber in your diet.  For informational purposes only. Not to replace the advice of your health care provider.   Copyright   2007 [employer]. All rights reserved. [employer] 373279 - REV 09/15.           If you start having the pain come back in the 1-2 days after stopping the antibiotics, please call the office  or the nurse advice line.     [phone] and push option 2 to get to our office when it is open    The 's numbers are [phone] and [phone]

## 2021-12-14 ENCOUNTER — HOSPITAL ENCOUNTER (OUTPATIENT)
Dept: ULTRASOUND IMAGING | Facility: HOSPITAL | Age: 76
Discharge: HOME | End: 2021-12-14
Attending: FAMILY MEDICINE | Admitting: FAMILY MEDICINE
Payer: COMMERCIAL

## 2021-12-14 DIAGNOSIS — N28.89 RENAL MASS OF UNKNOWN NATURE: ICD-10-CM

## 2021-12-14 DIAGNOSIS — I10 ESSENTIAL HYPERTENSION: ICD-10-CM

## 2021-12-14 PROCEDURE — 76775 US EXAM ABDO BACK WALL LIM: CPT

## 2021-12-15 RX ORDER — TRIAMTERENE AND HYDROCHLOROTHIAZIDE 75; 50 MG/1; MG/1
TABLET ORAL
Qty: 90 TABLET | Refills: 0 | Status: SHIPPED | OUTPATIENT
Start: 2021-12-15 | End: 2022-02-28

## 2021-12-16 ENCOUNTER — TELEPHONE (OUTPATIENT)
Dept: FAMILY MEDICINE | Facility: CLINIC | Age: 76
End: 2021-12-16
Payer: COMMERCIAL

## 2021-12-16 NOTE — TELEPHONE ENCOUNTER
Reason for call:  Other   Patient called regarding (reason for call): call back  Additional comments: Pt was in the ER a week ago and needs to get a colonoscopy scheduled. Please advise patient. The ER had the wrong clinic call her to get scheduled, she wants it done at a different clinic, so she needs the order placed for the correct clinic.     Phone number to reach patient:  Cell number on file:    Telephone Information:   Mobile [phone]       Best Time:  Anytime    Can we leave a detailed message on this number?  YES    Travel screening: Not Applicable

## 2021-12-16 NOTE — TELEPHONE ENCOUNTER
Call placed to patient    Patient states she needs to have a Colonoscopy scheduled after her ED visit     Patient prefers to have her Colonoscopy done at [facility] [place] [facility] [place] as she has had previous Colonoscopies there     Patient requests referral be sent to [facility] so she can schedule procedure there  Telephone number: [phone]    Will forward to [facility] to have GI referral faxed to [facility] per patient's request    Camacho Vaca RN

## 2021-12-21 ENCOUNTER — TELEPHONE (OUTPATIENT)
Dept: FAMILY MEDICINE | Facility: CLINIC | Age: 76
End: 2021-12-21
Payer: COMMERCIAL

## 2021-12-21 ENCOUNTER — OFFICE VISIT (OUTPATIENT)
Dept: FAMILY MEDICINE | Facility: CLINIC | Age: 76
End: 2021-12-21
Payer: COMMERCIAL

## 2021-12-21 ENCOUNTER — LAB (OUTPATIENT)
Dept: LAB | Facility: CLINIC | Age: 76
End: 2021-12-21
Payer: COMMERCIAL

## 2021-12-21 VITALS
BODY MASS INDEX: 33.29 KG/M2 | SYSTOLIC BLOOD PRESSURE: 138 MMHG | HEART RATE: 74 BPM | OXYGEN SATURATION: 98 % | HEIGHT: 64 IN | TEMPERATURE: 98 F | DIASTOLIC BLOOD PRESSURE: 72 MMHG | WEIGHT: 195 LBS

## 2021-12-21 DIAGNOSIS — R35.0 URINARY FREQUENCY: Primary | ICD-10-CM

## 2021-12-21 DIAGNOSIS — N28.89 RENAL MASS OF UNKNOWN NATURE: ICD-10-CM

## 2021-12-21 DIAGNOSIS — N30.01 ACUTE CYSTITIS WITH HEMATURIA: Primary | ICD-10-CM

## 2021-12-21 DIAGNOSIS — R35.0 URINARY FREQUENCY: ICD-10-CM

## 2021-12-21 LAB
ALBUMIN UR-MCNC: NEGATIVE MG/DL
APPEARANCE UR: CLEAR
BACTERIA #/AREA URNS HPF: ABNORMAL /HPF
BILIRUB UR QL STRIP: NEGATIVE
COLOR UR AUTO: YELLOW
GLUCOSE UR STRIP-MCNC: NEGATIVE MG/DL
HGB UR QL STRIP: ABNORMAL
KETONES UR STRIP-MCNC: NEGATIVE MG/DL
LEUKOCYTE ESTERASE UR QL STRIP: ABNORMAL
NITRATE UR QL: NEGATIVE
PH UR STRIP: 7.5 [PH] (ref 5–7)
RBC #/AREA URNS AUTO: ABNORMAL /HPF
SP GR UR STRIP: 1.02 (ref 1–1.03)
SQUAMOUS #/AREA URNS AUTO: ABNORMAL /LPF
UROBILINOGEN UR STRIP-ACNC: 0.2 E.U./DL
WBC #/AREA URNS AUTO: ABNORMAL /HPF

## 2021-12-21 PROCEDURE — 87086 URINE CULTURE/COLONY COUNT: CPT

## 2021-12-21 PROCEDURE — 99214 OFFICE O/P EST MOD 30 MIN: CPT | Performed by: FAMILY MEDICINE

## 2021-12-21 PROCEDURE — 81001 URINALYSIS AUTO W/SCOPE: CPT

## 2021-12-21 RX ORDER — NITROFURANTOIN 25; 75 MG/1; MG/1
100 CAPSULE ORAL 2 TIMES DAILY
Qty: 10 CAPSULE | Refills: 0 | Status: SHIPPED | OUTPATIENT
Start: 2021-12-21 | End: 2021-12-26

## 2021-12-21 ASSESSMENT — MIFFLIN-ST. JEOR: SCORE: 1356.57

## 2021-12-21 NOTE — PATIENT INSTRUCTIONS

## 2021-12-21 NOTE — TELEPHONE ENCOUNTER
Patient called reporting day 3 of urinary urgency, frequency and pressure.  Denies odor, urine is cloudy, stream is weaker than baseline.  Afebrile.  Denies abdominal pain or new back pain.  Denies nausea.  Scheduled lab only appointment for 1130 today.    Patient states she cannot use my chart and is unable to request evisit for symptoms.  Order placed for UA.  Will discuss with Dr Greene to see if she can do telephone visit with patient to discuss results and recommendations.  Patient agrees with plan.  Halle Graham RN

## 2021-12-21 NOTE — TELEPHONE ENCOUNTER
Reviewed symptoms reported with Dr Greene.  Advised if patient is coming at 1130 for lab appointment, she will see her after in clinic to discuss results and plan.  Ok to double book 1130 appointment, arriving at 1130.  [facility] appointment.  Call placed to patient, relayed recommendations and patient agrees with plan.  Halle Graham RN

## 2021-12-22 LAB — BACTERIA UR CULT: NORMAL

## 2021-12-23 DIAGNOSIS — I10 ESSENTIAL HYPERTENSION: ICD-10-CM

## 2021-12-24 RX ORDER — LISINOPRIL 10 MG/1
TABLET ORAL
Qty: 90 TABLET | Refills: 0 | Status: SHIPPED | OUTPATIENT
Start: 2021-12-24 | End: 2022-03-07

## 2021-12-24 NOTE — TELEPHONE ENCOUNTER
1. Essential hypertension  - lisinopril (ZESTRIL) 10 MG tablet; TAKE 1 TABLET DAILY  Dispense: 90 tablet; Refill: 0     Lisinopril refill sent to pharmacy for 90 days.  PCP out of office today.  Last creatinine and potassium normal, sodium slightly low at 132.  Her blood pressure was below 140/90.    Ciera Mayers, DO

## 2021-12-28 ENCOUNTER — TELEPHONE (OUTPATIENT)
Dept: FAMILY MEDICINE | Facility: CLINIC | Age: 76
End: 2021-12-28
Payer: COMMERCIAL

## 2021-12-28 DIAGNOSIS — N28.9 RENAL LESION: Primary | ICD-10-CM

## 2021-12-28 NOTE — TELEPHONE ENCOUNTER
"----- Message from Ilsa Jones sent at 2021  7:55 AM CST -----  Regarding: FW: Peer to Peer Review Request  Dr. Greene,    I am following up on my message sent to you yesterday, 2021, regarding the peer to peer request for Candelaria's appointment.  Please see below.     If you do not wish to complete this peer to peer review, please let me know as soon as possible so I may contact the patient.      If you would like to complete the peer to peer, please do so immediately as it is due today, 2021, and let me know of the outcome.    I will give the patient a call by end of day if I do not receive a response.     Please let me know if you have any questions.    Ilsa Robert  [facility] [place]  [phone]  ----- Message -----  From: Ilsa Jones  Sent: 2021   8:56 AM CST  To: Mari Greene MD  Subject: Peer to Peer Review Request                      Peer to Peer Request    Patient Name:  Candelaria Hewitt  :    1945  MRN:    [id number]    Dr. Greene,    The authorization for procedure CT ABDOMEN PELVIS W&WO CONTRAST (71415) on date of service 2021 has been denied. We were unsuccessful in obtaining approval through clinical review. A [id number] review can be done by calling the insurance/third party authorization vendor with the following information:    Insurance: [facility] MEDICARE ADVANTAGE  Auth Vendor:  [employer]  Phone:  [phone]  Due:  2021    Patient ID: [id number]  Case/Ref #: [id number]    Patient contact: Yes  Patient response:  Pt would like Peer to Peer review completed.  Patient Phone #: [phone]    Denial Reason:    \"Your records show that you have signs and/or symptoms in one body region, but your doctor requested to image more than one body region. Imaging the region where you have symptoms will show your doctor all of the details needed.    A detailed picture study (computed tomography or CT CPT 48965) of your abdomen with pictures taken before " "and after using a dye is more likely to show your doctor all they need to see in order to treat you. This study will be approved if requested.\"      If you feel you have additional clinical information that could get this denial overturned, please complete the Peer to Peer.  If you choose not to do the P2P, please let me know as soon as possible so that we can reach out to the patient and advise them of their denial.      Thank you,    Ilsa Jones  [facility]      "

## 2021-12-28 NOTE — TELEPHONE ENCOUNTER
938 am start of hold spoke with 2 different people. The code that was entered , even though that was what I was instructed to order was changed to what they wanted so it should go through. Mari Greene M.D.

## 2021-12-29 ENCOUNTER — HOSPITAL ENCOUNTER (OUTPATIENT)
Dept: CT IMAGING | Facility: HOSPITAL | Age: 76
Discharge: HOME | End: 2021-12-29
Attending: FAMILY MEDICINE | Admitting: FAMILY MEDICINE
Payer: COMMERCIAL

## 2021-12-29 DIAGNOSIS — N28.89 RENAL MASS OF UNKNOWN NATURE: ICD-10-CM

## 2021-12-29 PROCEDURE — 74178 CT ABD&PLV WO CNTR FLWD CNTR: CPT

## 2021-12-29 PROCEDURE — 250N000011 HC RX IP 250 OP 636: Performed by: FAMILY MEDICINE

## 2021-12-29 RX ORDER — IOPAMIDOL 755 MG/ML
100 INJECTION, SOLUTION INTRAVASCULAR ONCE
Status: COMPLETED | OUTPATIENT
Start: 2021-12-29 | End: 2021-12-29

## 2021-12-29 RX ADMIN — IOPAMIDOL 100 ML: 755 INJECTION, SOLUTION INTRAVENOUS at 11:54

## 2021-12-30 ENCOUNTER — TELEPHONE (OUTPATIENT)
Dept: FAMILY MEDICINE | Facility: CLINIC | Age: 76
End: 2021-12-30
Payer: COMMERCIAL

## 2021-12-30 DIAGNOSIS — R39.9 LOWER URINARY TRACT SYMPTOMS: Primary | ICD-10-CM

## 2021-12-30 NOTE — TELEPHONE ENCOUNTER
Call placed to patient     Patient states she finished her course of antibiotics on 12/25/2021  Patient reports her symptoms resolved up until today    Patient states today, she only urinates a small amount of urine with each void - decreased from norm    Patient reporting urinary urgency   Also experiencing slight burning with urination     Reports urine is a clear / darker yellow   Denies urine being odorous     Denies fevers  Reports eating and drinking per norm    Will forward to Dr. Greene to review    Camacho Vaca RN

## 2021-12-30 NOTE — TELEPHONE ENCOUNTER
Pt having urinary symptoms again, recently finished round of antibiotics. Wondering if she needs more meds.    Thank you,    Jemima Calderón, MIYA Lundy

## 2021-12-31 ENCOUNTER — LAB (OUTPATIENT)
Dept: LAB | Facility: CLINIC | Age: 76
End: 2021-12-31
Payer: COMMERCIAL

## 2021-12-31 DIAGNOSIS — R39.9 LOWER URINARY TRACT SYMPTOMS: ICD-10-CM

## 2021-12-31 LAB
ALBUMIN UR-MCNC: NEGATIVE MG/DL
APPEARANCE UR: CLEAR
BACTERIA #/AREA URNS HPF: ABNORMAL /HPF
BILIRUB UR QL STRIP: NEGATIVE
CLUE CELLS: ABNORMAL
COLOR UR AUTO: YELLOW
GLUCOSE UR STRIP-MCNC: NEGATIVE MG/DL
HGB UR QL STRIP: NEGATIVE
KETONES UR STRIP-MCNC: NEGATIVE MG/DL
LEUKOCYTE ESTERASE UR QL STRIP: ABNORMAL
NITRATE UR QL: NEGATIVE
PH UR STRIP: 7.5 [PH] (ref 5–7)
RBC #/AREA URNS AUTO: ABNORMAL /HPF
SP GR UR STRIP: 1.02 (ref 1–1.03)
SQUAMOUS #/AREA URNS AUTO: ABNORMAL /LPF
TRICHOMONAS, WET PREP: ABNORMAL
UROBILINOGEN UR STRIP-ACNC: 0.2 E.U./DL
WBC #/AREA URNS AUTO: ABNORMAL /HPF
WBC'S/HIGH POWER FIELD, WET PREP: ABNORMAL
YEAST, WET PREP: ABNORMAL

## 2021-12-31 PROCEDURE — 81001 URINALYSIS AUTO W/SCOPE: CPT

## 2021-12-31 PROCEDURE — 87210 SMEAR WET MOUNT SALINE/INK: CPT

## 2021-12-31 NOTE — TELEPHONE ENCOUNTER
Call placed to patient as she had questions when AFR attempted to help schedule lab only visit    Reviewed reason for ordering UA/UC and wet prep   Patient verbalized understanding  Patient will be to the [facility] in 10 minutes   No further questions/concerns    Camacho Vaca RN

## 2021-12-31 NOTE — TELEPHONE ENCOUNTER
She did not have growth in the last culture. I think something else is going on. I can put in an order for a repeat unit(s)/a and a wet prep. Please help her set up a lab appointment. Mari Greene M.D.

## 2022-03-04 DIAGNOSIS — I10 ESSENTIAL HYPERTENSION: ICD-10-CM

## 2022-03-07 RX ORDER — LISINOPRIL 10 MG/1
10 TABLET ORAL DAILY
Qty: 90 TABLET | Refills: 2 | Status: SHIPPED | OUTPATIENT
Start: 2022-03-07 | End: 2022-10-26

## 2022-03-07 NOTE — TELEPHONE ENCOUNTER
"Last Written Prescription Date:  12/24/21  Last Fill Quantity: 90,  # refills: 0   Last office visit provider:  11/16/21     Requested Prescriptions   Pending Prescriptions Disp Refills     lisinopril (ZESTRIL) 10 MG tablet [Pharmacy Med Name: LISINOPRIL TAB 10MG] 90 tablet 0     Sig: TAKE 1 TABLET DAILY       ACE Inhibitors (Including Combos) Protocol Passed - 3/7/2022  8:29 AM        Passed - Blood pressure under 140/90 in past 12 months     BP Readings from Last 3 Encounters:   12/21/21 138/72   12/09/21 120/74   12/06/21 (!) 163/87                 Passed - Recent (12 mo) or future (30 days) visit within the authorizing provider's specialty     Patient has had an office visit with the authorizing provider or a provider within the authorizing providers department within the previous 12 mos or has a future within next 30 days. See \"Patient Info\" tab in inbasket, or \"Choose Columns\" in Meds & Orders section of the refill encounter.              Passed - Medication is active on med list        Passed - Patient is age 18 or older        Passed - No active pregnancy on record        Passed - Normal serum creatinine on file in past 12 months     Recent Labs   Lab Test 12/06/21  1003   CR 0.72       Ok to refill medication if creatinine is low          Passed - Normal serum potassium on file in past 12 months     Recent Labs   Lab Test 12/06/21  1003   POTASSIUM 3.8             Passed - No positive pregnancy test within past 12 months             Aristides Townsend RN 03/07/22 8:29 AM  "

## 2022-04-04 DIAGNOSIS — M25.562 CHRONIC PAIN OF BOTH KNEES: ICD-10-CM

## 2022-04-04 DIAGNOSIS — G89.29 CHRONIC PAIN OF BOTH KNEES: ICD-10-CM

## 2022-04-04 DIAGNOSIS — M25.561 CHRONIC PAIN OF BOTH KNEES: ICD-10-CM

## 2022-04-04 NOTE — TELEPHONE ENCOUNTER
Routing refill request to provider for review/approval because:  Does not meet age requirements per RN protocol parameters    Camacho Vaca RN

## 2022-04-06 RX ORDER — CELECOXIB 100 MG/1
CAPSULE ORAL
Qty: 60 CAPSULE | Refills: 3 | Status: SHIPPED | OUTPATIENT
Start: 2022-04-06 | End: 2022-08-08

## 2022-05-10 DIAGNOSIS — E78.2 MIXED HYPERLIPIDEMIA: ICD-10-CM

## 2022-05-10 DIAGNOSIS — F33.40 RECURRENT MAJOR DEPRESSION IN REMISSION (H): ICD-10-CM

## 2022-05-10 RX ORDER — SIMVASTATIN 10 MG
TABLET ORAL
Qty: 90 TABLET | Refills: 3 | Status: SHIPPED | OUTPATIENT
Start: 2022-05-10 | End: 2023-01-11

## 2022-05-10 NOTE — TELEPHONE ENCOUNTER
"  Routing refill request to provider for review/approval because:  Labs not current:  PHQ9  Early refill request on the simvastatin            Requested Prescriptions   Pending Prescriptions Disp Refills     simvastatin (ZOCOR) 10 MG tablet [Pharmacy Med Name: SIMVASTATIN  TAB 10MG] 90 tablet 3     Sig: TAKE 1 TABLET AT BEDTIME       Statins Protocol Passed - 5/10/2022  9:47 AM        Passed - LDL on file in past 12 months     Recent Labs   Lab Test 11/16/21  1308   LDL 99             Passed - No abnormal creatine kinase in past 12 months     No lab results found.             Passed - Recent (12 mo) or future (30 days) visit within the authorizing provider's specialty     Patient has had an office visit with the authorizing provider or a provider within the authorizing providers department within the previous 12 mos or has a future within next 30 days. See \"Patient Info\" tab in inbasket, or \"Choose Columns\" in Meds & Orders section of the refill encounter.              Passed - Medication is active on med list        Passed - Patient is age 18 or older        Passed - No active pregnancy on record        Passed - No positive pregnancy test in past 12 months           FLUoxetine (PROZAC) 20 MG capsule [Pharmacy Med Name: FLUOXETIN(P) CAP 20MG] 90 capsule 3     Sig: TAKE 1 CAPSULE DAILY       SSRIs Protocol Failed - 5/10/2022  9:47 AM        Failed - PHQ-9 score less than 5 in past 6 months     Please review last PHQ-9 score.     PHQ 8/18/2020 4/6/2021 11/16/2021   PHQ-9 Total Score 3 2 6   Q9: Thoughts of better off dead/self-harm past 2 weeks Not at all Not at all Not at all               Passed - Medication is active on med list        Passed - Patient is age 18 or older        Passed - No active pregnancy on record        Passed - No positive pregnancy test in last 12 months        Passed - Recent (6 mo) or future (30 days) visit within the authorizing provider's specialty     Patient had office visit in the last " "6 months or has a visit in the next 30 days with authorizing provider or within the authorizing provider's specialty.  See \"Patient Info\" tab in inbasket, or \"Choose Columns\" in Meds & Orders section of the refill encounter.                 Mariano Manuel 05/10/22 1:26 PM  "

## 2022-06-06 ENCOUNTER — TELEPHONE (OUTPATIENT)
Dept: FAMILY MEDICINE | Facility: CLINIC | Age: 77
End: 2022-06-06

## 2022-06-06 ENCOUNTER — VIRTUAL VISIT (OUTPATIENT)
Dept: FAMILY MEDICINE | Facility: CLINIC | Age: 77
End: 2022-06-06
Payer: COMMERCIAL

## 2022-06-06 DIAGNOSIS — E66.01 MORBID OBESITY (H): ICD-10-CM

## 2022-06-06 DIAGNOSIS — U07.1 INFECTION DUE TO 2019 NOVEL CORONAVIRUS: Primary | ICD-10-CM

## 2022-06-06 PROCEDURE — 99213 OFFICE O/P EST LOW 20 MIN: CPT | Mod: 95 | Performed by: FAMILY MEDICINE

## 2022-06-06 NOTE — TELEPHONE ENCOUNTER
Patient called to report +covid test on Saturday 6/4/22.  Symptoms of cough started on Saturday, now patient is having headache and fatigue.  Patient is pushing po fluids, is taking rest periods.  Patient would like a telephone visit with Dr Greene to discuss antivirals.  Discussed with Kristina ARGUELLES, waiting to hear from Dr Greene if okay to work into her schedule today.  Tentative time of 1100 given to patient, will reach out to patient after speaking with Dr Greene.  Halle Graham RN

## 2022-06-06 NOTE — PROGRESS NOTES
"      Candelaria Hewitt is a 76 year old female who is being evaluated via a billable telephone visit.      What phone number would you like to be contacted at? [phone]  How would you like to obtain your AVS? Mail a copy      Assessment & Plan     Morbid obesity (H)  One of the qualifying conditions.    Infection due to 2019 novel coronavirus  We discussed side effects drug interactions what to expect.  I gave her the [employer] phone number.  She is going to stop her simvastatin she will not take it today or for the next 10 days if she does end up taking the paxlovid.  - nirmatrelvir and ritonavir (PAXLOVID) therapy pack; Take 3 tablets by mouth 2 times daily       BMI:   Estimated body mass index is 34 kg/m  as calculated from the following:    Height as of 12/21/21: 1.613 m (5' 3.5\").    Weight as of 12/21/21: 88.5 kg (195 lb).       COVID-19 positive patient.  Encounter for consideration of medication intervention. Patient does qualify for a prescription. Full discussion with patient including medication options, risks and benefits. Potential drug interactions reviewed with patient.     Treatment Planned Paxlovid, Rx sent to [facility]  [facility]   [phone]    [address] [place] [address]    Hours:  Mon-Fri: 8:30a - 5:00p  Sat-Sun: 9:00a - 1:00p    Drive-thru available   Temporary change to home medications: hold simvastatin 10 days     Estimated body mass index is 34 kg/m  as calculated from the following:    Height as of 12/21/21: 1.613 m (5' 3.5\").    Weight as of 12/21/21: 88.5 kg (195 lb).  GFR Estimate   Date Value Ref Range Status   12/06/2021 82 >60 mL/min/1.73m2 Final     Comment:     As of July 11, 2021, eGFR is calculated by the CKD-EPI creatinine equation, without race adjustment. eGFR can be influenced by muscle mass, exercise, and diet. The reported eGFR is an estimation only and is only applicable if the renal function is stable.   08/18/2020 >60 >60 " mL/min/1.73m2 Final     Lab Results   Component Value Date    [id number]  11/16/2021     Testing sent to reference lab. Results will be returned via unsolicited result    [id number] NOT DETECTED 11/16/2021       No follow-ups on file.    Mari Greene MD  [facility] EZEKIEL Hewitt is a 76 year old female who presents via phone visit today for the following health issues:      COVID-19 Symptom Review  How many days ago did these symptoms start? Saturday, with cough, headache, fatigue.   Tested positive Sunday.     Are any of the following symptoms significant for you?    New or worsening difficulty breathing? No    Worsening cough? Yes, I am coughing up mucus.    Fever or chills? No    Headache: YES    Sore throat: YES - from coughing    Chest pain: YES - from coughing over the weekedn    Diarrhea: no    Body aches? YES    What treatments has patient tried? Acetaminophen   Does patient live in a nursing home, group home, or shelter? no  Does patient have a way to get food/medications during quarantined? Yes, I have a friend or family member who can help me.          As above she tested positive   Using tylenol  Tea  She has only been ill for 2 dys   We discussed the side effects and the interaction she takes her Zocor at night so I told her to go hide her bottle because she can restart it in 10 days.  As she is only been ill for the 2 days I think she would most likely get benefit from taking this.  If the side effects are too much she can just stop the medication.    Review of Systems   Constitutional, HEENT, cardiovascular, pulmonary, gi and gu systems are negative, except as otherwise noted.       Objective          Vitals:  No vitals were obtained today due to virtual visit.    healthy, alert and no distress  PSYCH: Alert and oriented times 3; coherent speech, normal   rate and volume, able to articulate logical thoughts, able   to abstract reason, no tangential  thoughts, no hallucinations   or delusions  Her affect is normal  RESP: No cough, no audible wheezing, able to talk in full sentences  Remainder of exam unable to be completed due to telephone visits    Home test positive         Phone call duration:  25 minutes   spent in chart review medication interactions and phone call and charting  Mari Greene M.D.

## 2022-06-16 ENCOUNTER — TELEPHONE (OUTPATIENT)
Dept: FAMILY MEDICINE | Facility: CLINIC | Age: 77
End: 2022-06-16
Payer: COMMERCIAL

## 2022-06-16 NOTE — TELEPHONE ENCOUNTER
Pt asking when she can resume simvastatin after starting/ completing antiviral.  Per 06-06-22 VV- Infection due to 2019 novel coronavirus  We discussed side effects drug interactions what to expect.  I gave her the [employer] phone number.  She is going to stop her simvastatin she will not take it today or for the next 10 days if she does end up taking the paxlovid.  - nirmatrelvir and ritonavir (PAXLOVID) therapy pack; Take 3 tablets by mouth 2 times daily    States started paxlovid 06-06-22, completed.  Will resume simvastatin tomorrow.  JANETTE Bella RN

## 2022-08-05 DIAGNOSIS — M25.562 CHRONIC PAIN OF BOTH KNEES: ICD-10-CM

## 2022-08-05 DIAGNOSIS — M25.561 CHRONIC PAIN OF BOTH KNEES: ICD-10-CM

## 2022-08-05 DIAGNOSIS — G89.29 CHRONIC PAIN OF BOTH KNEES: ICD-10-CM

## 2022-08-05 NOTE — TELEPHONE ENCOUNTER
Routing refill request to provider for review/approval because:  Does not meet RN protocol parameters due to age    Camacho Vaca RN

## 2022-08-08 RX ORDER — CELECOXIB 100 MG/1
CAPSULE ORAL
Qty: 180 CAPSULE | Refills: 0 | Status: SHIPPED | OUTPATIENT
Start: 2022-08-08 | End: 2022-11-07

## 2022-08-08 RX ORDER — CELECOXIB 100 MG/1
CAPSULE ORAL
Qty: 30 CAPSULE | Refills: 0 | Status: SHIPPED | OUTPATIENT
Start: 2022-08-08 | End: 2022-08-08

## 2022-08-08 NOTE — TELEPHONE ENCOUNTER
Patient was needing a 90 day supply of her Celebrex as she is heading off to [place] 9/5/22-10/5/22. So I sent that in for her.     She is also wondering if she should get another covoid booster before she leaves on 9/5/22 as she tested positive for covid on 6/4/22 home test and was treated with Paxlovid on 6/6/22. Her last booster was at the [facility] in [place] on 10/25/21 but is not sure which one she received. Please advise. Thank you!    Becca Lopez, GURWINDER

## 2022-08-22 ENCOUNTER — VIRTUAL VISIT (OUTPATIENT)
Dept: FAMILY MEDICINE | Facility: CLINIC | Age: 77
End: 2022-08-22
Payer: COMMERCIAL

## 2022-08-22 DIAGNOSIS — Z11.59 NEED FOR HEPATITIS C SCREENING TEST: Primary | ICD-10-CM

## 2022-08-22 DIAGNOSIS — F33.40 RECURRENT MAJOR DEPRESSION IN REMISSION (H): ICD-10-CM

## 2022-08-22 DIAGNOSIS — I10 ESSENTIAL HYPERTENSION: ICD-10-CM

## 2022-08-22 DIAGNOSIS — R73.03 PREDIABETES: ICD-10-CM

## 2022-08-22 PROCEDURE — 99214 OFFICE O/P EST MOD 30 MIN: CPT | Mod: 95 | Performed by: FAMILY MEDICINE

## 2022-08-22 RX ORDER — NORTRIPTYLINE HCL 10 MG
CAPSULE ORAL
Qty: 90 CAPSULE | Refills: 1 | Status: SHIPPED | OUTPATIENT
Start: 2022-08-22 | End: 2023-01-11

## 2022-08-22 RX ORDER — TRIAMTERENE AND HYDROCHLOROTHIAZIDE 75; 50 MG/1; MG/1
1 TABLET ORAL DAILY
Qty: 90 TABLET | Refills: 1 | Status: SHIPPED | OUTPATIENT
Start: 2022-08-22 | End: 2023-01-11

## 2022-08-22 NOTE — PROGRESS NOTES
"      Candelaria Hewitt is a 76 year old female who is being evaluated via a billable telephone visit.      What phone number would you like to be contacted at? [phone]  How would you like to obtain your AVS? 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)  Doing well this does help at bedtime cont the prozac   - nortriptyline (PAMELOR) 10 MG capsule; TAKE 1 CAPSULE AT BEDTIME    Essential hypertension  Will check labs she did startt on celebrex and we will see how that is affecting her kidneys   - triamterene-HCTZ (MAXZIDE) 75-50 MG tablet; Take 1 tablet by mouth daily  - Basic metabolic panel  (Ca, Cl, CO2, Creat, Gluc, K, Na, BUN); Future    Need for hepatitis C screening test  ok  - Hepatitis C Screen Reflex to HCV RNA Quant and Genotype; Future    Prediabetes  Recheck   - Hemoglobin A1c; Future         BMI:   Estimated body mass index is 34 kg/m  as calculated from the following:    Height as of 12/21/21: 1.613 m (5' 3.5\").    Weight as of 12/21/21: 88.5 kg (195 lb).       Patient Instructions   Recommend compression socks 20-30 mm hg try a sporting goods store so you can see if they fit you        Return in about 3 months (around 11/22/2022) for Physical Exam, med check.    Mari Greene MD  [facility]     Candelaria Hewitt is a 76 year old female who presents via phone visit today for the following health issues:    *Discuss any lab tests that need to be complete.   *Lower leg issues, veins, tired.   *Feet are sore, unsure if arthritis related?   *Leaving for [place] for a month    Hypertension Follow-up    Do you check your blood pressure regularly outside of the clinic? No     Are you following a low salt diet? Yes    Are your blood pressures ever more than 140 on the top number (systolic) OR more   than 90 on the bottom number (diastolic), for example 140/90? No    BP Readings from Last 6 Encounters:   12/21/21 138/72   12/09/21 " 120/74   12/06/21 (!) 163/87   11/16/21 132/82   08/27/21 (!) 140/84   08/24/21 (!) 152/75         She is leaving for [place] for a month. She needs labs and she is having some trouble with the legs and feet   And she has some arthritis in her feet   She is traveling to her sisters vacation home     And she is due for labs we started the celebrex this does help            Review of Systems   Constitutional, HEENT, cardiovascular, pulmonary, gi and gu systems are negative, except as otherwise noted.       Objective          Vitals:  No vitals were obtained today due to virtual visit.    healthy, alert and no distress  PSYCH: Alert and oriented times 3; coherent speech, normal   rate and volume, able to articulate logical thoughts, able   to abstract reason, no tangential thoughts, no hallucinations   or delusions  Her affect is normal  RESP: No cough, no audible wheezing, able to talk in full sentences  Remainder of exam unable to be completed due to telephone visits    Lab on 12/31/2021   Component Date Value Ref Range Status     Trichomonas 12/31/2021 Absent  Absent Final     Yeast 12/31/2021 Absent  Absent Final     Clue Cells 12/31/2021 Absent  Absent Final     WBCs/high power field 12/31/2021 3+ (A) None Final     Color Urine 12/31/2021 Yellow  Colorless, Straw, Light Yellow, Yellow Final     Appearance Urine 12/31/2021 Clear  Clear Final     Glucose Urine 12/31/2021 Negative  Negative mg/dL Final     Bilirubin Urine 12/31/2021 Negative  Negative Final     Ketones Urine 12/31/2021 Negative  Negative mg/dL Final     Specific Gravity Urine 12/31/2021 1.020  1.003 - 1.035 Final     Blood Urine 12/31/2021 Negative  Negative Final     pH Urine 12/31/2021 7.5 (A) 5.0 - 7.0 Final     Protein Albumin Urine 12/31/2021 Negative  Negative mg/dL Final     Urobilinogen Urine 12/31/2021 0.2  0.2, 1.0 E.U./dL Final     Nitrite Urine 12/31/2021 Negative  Negative Final     Leukocyte Esterase Urine 12/31/2021 Trace (A) Negative  Final     Bacteria Urine 12/31/2021 Few (A) None Seen /HPF Final     RBC Urine 12/31/2021 0-2  0-2 /HPF /HPF Final     WBC Urine 12/31/2021 0-5  0-5 /HPF /HPF Final     Squamous Epithelials Urine 12/31/2021 Few (A) None Seen /LPF Final           Phone call duration:  13 minutes  spent in chart review medication interactions and phone call and charting  Mari Greene M.D.

## 2022-08-22 NOTE — PATIENT INSTRUCTIONS
Recommend compression socks 20-30 mm hg try a sporting [employer] store so you can see if they fit you

## 2022-08-23 ENCOUNTER — LAB (OUTPATIENT)
Dept: LAB | Facility: CLINIC | Age: 77
End: 2022-08-23
Payer: COMMERCIAL

## 2022-08-23 DIAGNOSIS — Z11.59 NEED FOR HEPATITIS C SCREENING TEST: ICD-10-CM

## 2022-08-23 DIAGNOSIS — R73.03 PREDIABETES: ICD-10-CM

## 2022-08-23 DIAGNOSIS — I10 ESSENTIAL HYPERTENSION: ICD-10-CM

## 2022-08-23 LAB
ANION GAP SERPL CALCULATED.3IONS-SCNC: 7 MMOL/L (ref 3–14)
BUN SERPL-MCNC: 20 MG/DL (ref 7–30)
CALCIUM SERPL-MCNC: 9.2 MG/DL (ref 8.5–10.1)
CHLORIDE BLD-SCNC: 106 MMOL/L (ref 94–109)
CO2 SERPL-SCNC: 24 MMOL/L (ref 20–32)
CREAT SERPL-MCNC: 0.7 MG/DL (ref 0.52–1.04)
GFR SERPL CREATININE-BSD FRML MDRD: 90 ML/MIN/1.73M2
GLUCOSE BLD-MCNC: 141 MG/DL (ref 70–99)
HBA1C MFR BLD: 6.6 % (ref 0–5.6)
POTASSIUM BLD-SCNC: 4.5 MMOL/L (ref 3.4–5.3)
SODIUM SERPL-SCNC: 137 MMOL/L (ref 133–144)

## 2022-08-23 PROCEDURE — 86803 HEPATITIS C AB TEST: CPT

## 2022-08-23 PROCEDURE — 36415 COLL VENOUS BLD VENIPUNCTURE: CPT

## 2022-08-23 PROCEDURE — 83036 HEMOGLOBIN GLYCOSYLATED A1C: CPT

## 2022-08-23 PROCEDURE — 80048 BASIC METABOLIC PNL TOTAL CA: CPT

## 2022-08-24 LAB — HCV AB SERPL QL IA: NONREACTIVE

## 2022-08-25 NOTE — RESULT ENCOUNTER NOTE
Candelaria,  Your lab results were normal/stable. Please feel free to my chart or call the office with questions. Mari Greene M.D.

## 2022-09-24 ENCOUNTER — HEALTH MAINTENANCE LETTER (OUTPATIENT)
Age: 77
End: 2022-09-24

## 2022-10-28 ENCOUNTER — ANCILLARY PROCEDURE (OUTPATIENT)
Dept: MAMMOGRAPHY | Facility: CLINIC | Age: 77
End: 2022-10-28
Attending: FAMILY MEDICINE
Payer: COMMERCIAL

## 2022-10-28 DIAGNOSIS — Z12.31 VISIT FOR SCREENING MAMMOGRAM: ICD-10-CM

## 2022-10-28 PROCEDURE — 77067 SCR MAMMO BI INCL CAD: CPT

## 2022-11-02 DIAGNOSIS — M25.562 CHRONIC PAIN OF BOTH KNEES: ICD-10-CM

## 2022-11-02 DIAGNOSIS — G89.29 CHRONIC PAIN OF BOTH KNEES: ICD-10-CM

## 2022-11-02 DIAGNOSIS — M25.561 CHRONIC PAIN OF BOTH KNEES: ICD-10-CM

## 2022-11-02 NOTE — TELEPHONE ENCOUNTER
"Has appointment scheduled for 11/16/22      Requested Prescriptions   Pending Prescriptions Disp Refills     celecoxib (CELEBREX) 100 MG capsule [Pharmacy Med Name: CELECOXIB 100MG CAPSULES] 180 capsule 0     Sig: TAKE 1 CAPSULE(100 MG) BY MOUTH TWICE DAILY       NSAID Medications Failed - 11/2/2022 11:29 AM        Failed - Patient is age 6-64 years        Failed - Normal CBC on file in past 12 months     Recent Labs   Lab Test 12/06/21  1003   WBC 12.8*   RBC 5.22*   HGB 15.9*   HCT 46.8                    Passed - Blood pressure under 140/90 in past 12 months     BP Readings from Last 3 Encounters:   12/21/21 138/72   12/09/21 120/74   12/06/21 (!) 163/87                 Passed - Normal ALT on file in past 12 months     Recent Labs   Lab Test 12/06/21  1003   ALT 43             Passed - Normal AST on file in past 12 months     Recent Labs   Lab Test 12/06/21  1003   AST 28             Passed - Recent (12 mo) or future (30 days) visit within the authorizing provider's specialty     Patient has had an office visit with the authorizing provider or a provider within the authorizing providers department within the previous 12 mos or has a future within next 30 days. See \"Patient Info\" tab in inbasket, or \"Choose Columns\" in Meds & Orders section of the refill encounter.              Passed - Medication is active on med list        Passed - No active pregnancy on record        Passed - Normal serum creatinine on file in past 12 months     Recent Labs   Lab Test 08/23/22  1500   CR 0.70       Ok to refill medication if creatinine is low          Passed - No positive pregnancy test in past 12 months             "

## 2022-11-07 RX ORDER — CELECOXIB 100 MG/1
CAPSULE ORAL
Qty: 180 CAPSULE | Refills: 0 | Status: SHIPPED | OUTPATIENT
Start: 2022-11-07 | End: 2022-11-16

## 2022-11-07 NOTE — TELEPHONE ENCOUNTER
Patient took last pill today, requesting refill today please. Has 11/16 appt.    Thank you,    Jemima Calderón, MIYA Lundy

## 2022-11-08 DIAGNOSIS — M25.562 CHRONIC PAIN OF BOTH KNEES: ICD-10-CM

## 2022-11-08 DIAGNOSIS — M25.561 CHRONIC PAIN OF BOTH KNEES: ICD-10-CM

## 2022-11-08 DIAGNOSIS — G89.29 CHRONIC PAIN OF BOTH KNEES: ICD-10-CM

## 2022-11-08 RX ORDER — CELECOXIB 100 MG/1
CAPSULE ORAL
Qty: 180 CAPSULE | Refills: 0 | Status: CANCELLED | OUTPATIENT
Start: 2022-11-08

## 2022-11-16 ENCOUNTER — OFFICE VISIT (OUTPATIENT)
Dept: FAMILY MEDICINE | Facility: CLINIC | Age: 77
End: 2022-11-16
Payer: COMMERCIAL

## 2022-11-16 VITALS
OXYGEN SATURATION: 97 % | HEART RATE: 81 BPM | BODY MASS INDEX: 32.95 KG/M2 | DIASTOLIC BLOOD PRESSURE: 82 MMHG | WEIGHT: 193 LBS | TEMPERATURE: 98.4 F | SYSTOLIC BLOOD PRESSURE: 130 MMHG | HEIGHT: 64 IN

## 2022-11-16 DIAGNOSIS — M25.562 CHRONIC PAIN OF BOTH KNEES: ICD-10-CM

## 2022-11-16 DIAGNOSIS — Z00.00 ENCOUNTER FOR MEDICARE ANNUAL WELLNESS EXAM: Primary | ICD-10-CM

## 2022-11-16 DIAGNOSIS — I10 ESSENTIAL HYPERTENSION: ICD-10-CM

## 2022-11-16 DIAGNOSIS — G89.29 CHRONIC PAIN OF BOTH KNEES: ICD-10-CM

## 2022-11-16 DIAGNOSIS — M25.561 CHRONIC PAIN OF BOTH KNEES: ICD-10-CM

## 2022-11-16 DIAGNOSIS — E66.01 MORBID OBESITY (H): ICD-10-CM

## 2022-11-16 PROCEDURE — 99214 OFFICE O/P EST MOD 30 MIN: CPT | Mod: 25 | Performed by: FAMILY MEDICINE

## 2022-11-16 PROCEDURE — G0439 PPPS, SUBSEQ VISIT: HCPCS | Performed by: FAMILY MEDICINE

## 2022-11-16 RX ORDER — CELECOXIB 100 MG/1
CAPSULE ORAL
Qty: 180 CAPSULE | Refills: 3 | Status: SHIPPED | OUTPATIENT
Start: 2022-11-16 | End: 2023-01-11

## 2022-11-16 RX ORDER — LISINOPRIL 10 MG/1
10 TABLET ORAL DAILY
Qty: 90 TABLET | Refills: 3 | Status: SHIPPED | OUTPATIENT
Start: 2022-11-16 | End: 2023-01-11

## 2022-11-16 ASSESSMENT — PATIENT HEALTH QUESTIONNAIRE - PHQ9
10. IF YOU CHECKED OFF ANY PROBLEMS, HOW DIFFICULT HAVE THESE PROBLEMS MADE IT FOR YOU TO DO YOUR WORK, TAKE CARE OF THINGS AT HOME, OR GET ALONG WITH OTHER PEOPLE: NOT DIFFICULT AT ALL
SUM OF ALL RESPONSES TO PHQ QUESTIONS 1-9: 4
SUM OF ALL RESPONSES TO PHQ QUESTIONS 1-9: 4

## 2022-11-16 ASSESSMENT — ACTIVITIES OF DAILY LIVING (ADL): CURRENT_FUNCTION: NO ASSISTANCE NEEDED

## 2022-11-16 NOTE — PATIENT INSTRUCTIONS
Patient Education   Personalized Prevention Plan  You are due for the preventive services outlined below.  Your care team is available to assist you in scheduling these services.  If you have already completed any of these items, please share that information with your care team to update in your medical record.  Health Maintenance Due   Topic Date Due     Depression Action Plan  Never done     LUNG CANCER SCREENING  Never done     Flu Vaccine (1) Never done     COVID-19 Vaccine (5 - Booster for Moderna series) 10/11/2022     ANNUAL REVIEW OF HM ORDERS  11/16/2022       Signs of Hearing Loss      Hearing much better with one ear can be a sign of hearing loss.   Hearing loss is a problem shared by many people. In fact, it is one of the most common health problems, particularly as people age. Most people age 65 and older have some hearing loss. By age 80, almost everyone does. Hearing loss often occurs slowly over the years. So you may not realize your hearing has gotten worse.  Have your hearing checked  Call your healthcare provider if you:    Have to strain to hear normal conversation    Have to watch other people s faces very carefully to follow what they re saying    Need to ask people to repeat what they ve said    Often misunderstand what people are saying    Turn the volume of the television or radio up so high that others complain    Feel that people are mumbling when they re talking to you    Find that the effort to hear leaves you feeling tired and irritated    Notice, when using the phone, that you hear better with one ear than the other  StayWell last reviewed this educational content on 1/1/2020 2000-2021 [employer]. All rights reserved. This information is not intended as a substitute for professional medical care. Always follow your healthcare professional's instructions.          Urinary Incontinence, Female (Adult)   Urinary incontinence means loss of bladder control. This problem  affects many women, especially as they get older. If you have incontinence, you may be embarrassed to ask for help. But know that this problem can be treated.   Types of Incontinence  There are different types of incontinence. Two of the main types are described here. You can have more than one type.     Stress incontinence. With this type, urine leaks when pressure (stress) is put on the bladder. This may happen when you cough, sneeze, or laugh. Stress incontinence most often occurs because the pelvic floor muscles that support the bladder and urethra are weak. This can happen after pregnancy and vaginal childbirth or a hysterectomy. It can also be due to excess body weight or hormone changes.    Urge incontinence (also called overactive bladder). With this type, a sudden urge to urinate is felt often. This may happen even though there may not be much urine in the bladder. The need to urinate often during the night is common. Urge incontinence most often occurs because of bladder spasms. This may be due to bladder irritation or infection. Damage to bladder nerves or pelvic muscles, constipation, and certain medicines can also lead to urge incontinence.  Treatment depends on the cause. Further evaluation is needed to find the type you have. This will likely include an exam and certain tests. Based on the results, you and your healthcare provider can then plan treatment. Until a diagnosis is made, the home care tips below can help ease symptoms.   Home care    Do pelvic floor muscle exercises, if they are prescribed. The pelvic floor muscles help support the bladder and urethra. Many women find that their symptoms improve when doing special exercises that strengthen these muscles. To do the exercises, contract the muscles you would use to stop your stream of urine. But do this when you re not urinating. Hold for 10 seconds, then relax. Repeat 10 to 20 times in a row, at least 3 times a day. Your healthcare provider  may give you other instructions for how to do the exercises and how often.    Keep a bladder diary. This helps track how often and how much you urinate over a set period of time. Bring this diary with you to your next visit with the provider. The information can help your provider learn more about your bladder problem.    Lose weight, if advised to by your provider. Extra weight puts pressure on the bladder. Your provider can help you create a weight-loss plan that s right for you. This may include exercising more and making certain diet changes.    Don't have foods and drinks that may irritate the bladder. These can include alcohol and caffeinated drinks.    Quit smoking. Smoking and other tobacco use can lead to a long-term (chronic) cough that strains the pelvic floor muscles. Smoking may also damage the bladder and urethra. Talk with your provider about treatments or methods you can use to quit smoking.    If drinking large amounts of fluid makes you have symptoms, you may be advised to limit your fluid intake. You may also be advised to drink most of your fluids during the day and to limit fluids at night.    If you re worried about urine leakage or accidents, you may wear absorbent pads to catch urine. Change the pads often. This helps reduce discomfort. It may also reduce the risk of skin or bladder infections.    Follow-up care  Follow up with your healthcare provider, or as directed. It may take some to find the right treatment for your problem. But healthy lifestyle changes can be made right away. These include such things as exercising on a regular basis, eating a healthy diet, losing weight (if needed), and quitting smoking. Your treatment plan may include special therapies or medicines. Certain procedures or surgery may also be options. Talk about any questions you have with your provider.   When to seek medical advice  Call the healthcare provider right away if any of these occur:    Fever of 100.4 F  (38 C) or higher, or as directed by your provider    Bladder pain or fullness    Belly swelling    Nausea or vomiting    Back pain    Weakness, dizziness, or fainting  Katelyn last reviewed this educational content on 1/1/2020 2000-2021 [employer]. All rights reserved. This information is not intended as a substitute for professional medical care. Always follow your healthcare professional's instructions.

## 2022-11-16 NOTE — PROGRESS NOTES
"SUBJECTIVE:   Candelaria is a 77 year old who presents for Preventive Visit.  Patient has been advised of split billing requirements and indicates understanding: Yes  Are you in the first 12 months of your Medicare coverage?  No    Healthy Habits:     In general, how would you rate your overall health?  Good    Frequency of exercise:  4-5 days/week    Duration of exercise:  Other    Do you usually eat at least 4 servings of fruit and vegetables a day, include whole grains    & fiber and avoid regularly eating high fat or \"junk\" foods?  Yes    Taking medications regularly:  Yes    Barriers to taking medications:  Not applicable    Medication side effects:  None    Ability to successfully perform activities of daily living:  No assistance needed    Home Safety:  No safety concerns identified    Hearing Impairment:  Difficulty understanding soft or whispered speech    In the past 6 months, have you been bothered by leaking of urine? Yes    In general, how would you rate your overall mental or emotional health?  Good      PHQ-2 Total Score: 2    Additional concerns today:  No      Answers for HPI/ROS submitted by the patient on 11/16/2022  If you checked off any problems, how difficult have these problems made it for you to do your work, take care of things at home, or get along with other people?: Not difficult at all  PHQ9 TOTAL SCORE: 4    Have you ever done Advance Care Planning? (For example, a Health Directive, POLST, or a discussion with a medical provider or your loved ones about your wishes): Yes, patient states has an Advance Care Planning document and will bring a copy to the clinic.      Fall risk  Fallen 2 or more times in the past year?: No  Any fall with injury in the past year?: No      Cognitive Screening   1) Repeat 3 items (Leader, Season, Table)    2) Clock draw: NORMAL  3) 3 item recall: Recalls 3 objects  Results: 3 items recalled: COGNITIVE IMPAIRMENT LESS LIKELY  Mini-CogTM Copyright S Sukhi. " Licensed by the author for use in [facility]; reprinted with permission (elba@[facility]). All rights reserved.        Do you have sleep apnea, excessive snoring or daytime drowsiness?: yes    Reviewed and updated as needed this visit by clinical staff   Tobacco  Allergies  Meds  Problems  Med Hx  Surg Hx  Fam Hx          Reviewed and updated as needed this visit by Provider                 Social History     Tobacco Use     Smoking status: Former     Types: Cigarettes     Smokeless tobacco: Former   Substance Use Topics     Alcohol use: Yes     Alcohol/week: 1.0 standard drink     If you drink alcohol do you typically have >3 drinks per day or >7 drinks per week? No    Alcohol Use 11/16/2021   Prescreen: >3 drinks/day or >7 drinks/week? No   No flowsheet data found.        She went to [place] and had a great time     Current providers sharing in care for this patient include:   Patient Care Team:  Mari Greene MD as PCP - General (Family Medicine)  Joshua Balderas MD as Assigned Heart and Vascular Provider  Mari Greene MD as Assigned PCP    The following health maintenance items are reviewed in Epic and correct as of today:  Health Maintenance   Topic Date Due     DEPRESSION ACTION PLAN  Never done     LUNG CANCER SCREENING  Never done     INFLUENZA VACCINE (1) Never done     COVID-19 Vaccine (5 - Booster for Moderna series) 10/11/2022     MEDICARE ANNUAL WELLNESS VISIT  11/16/2022     ANNUAL REVIEW OF HM ORDERS  11/16/2022     PHQ-9  05/16/2023     FALL RISK ASSESSMENT  11/16/2023     LIPID  11/16/2026     ADVANCE CARE PLANNING  11/16/2026     DTAP/TDAP/TD IMMUNIZATION (4 - Td or Tdap) 02/09/2028     DEXA  04/26/2034     HEPATITIS C SCREENING  Completed     Pneumococcal Vaccine: 65+ Years  Completed     ZOSTER IMMUNIZATION  Completed     IPV IMMUNIZATION  Aged Out     MENINGITIS IMMUNIZATION  Aged Out     MAMMO SCREENING  Discontinued     COLORECTAL CANCER SCREENING  Discontinued  "      Mammogram Screening: Mammogram Screening - Patient over age 75, has elected to continue with screening.      Pertinent mammograms are reviewed under the imaging tab.    Review of Systems  Constitutional, HEENT, cardiovascular, pulmonary, gi and gu systems are negative, except as otherwise noted.    OBJECTIVE:   /82   Pulse 81   Temp 98.4  F (36.9  C) (Tympanic)   Ht 1.613 m (5' 3.5\")   Wt 87.5 kg (193 lb)   LMP  (LMP Unknown)   SpO2 97%   BMI 33.65 kg/m   Estimated body mass index is 33.65 kg/m  as calculated from the following:    Height as of this encounter: 1.613 m (5' 3.5\").    Weight as of this encounter: 87.5 kg (193 lb).  Physical Exam  GENERAL APPEARANCE: healthy, alert and no distress  EYES: Eyes grossly normal to inspection, PERRL and conjunctivae and sclerae normal  HENT: ear canals and TM's normal, nose and mouth without ulcers or lesions, oropharynx clear and oral mucous membranes moist  NECK: no adenopathy, no asymmetry, masses, or scars and thyroid normal to palpation  RESP: lungs clear to auscultation - no rales, rhonchi or wheezes  BREAST: normal without masses, tenderness or nipple discharge and no palpable axillary masses or adenopathy  CV: regular rate and rhythm, normal S1 S2, no S3 or S4, no murmur, click or rub, no peripheral edema and peripheral pulses strong  ABDOMEN: soft, nontender, no hepatosplenomegaly, no masses and bowel sounds normal  MS: no musculoskeletal defects are noted and gait is age appropriate without ataxia  MS: virtual visit   SKIN: no suspicious lesions or rashes  NEURO: Normal strength and tone, sensory exam grossly normal, mentation intact and speech normal  PSYCH: mentation appears normal and affect normal/bright    Diagnostic Test Results:  Labs reviewed in Epic  No results found for this or any previous visit (from the past 24 hour(s)).    ASSESSMENT / PLAN:   (Z00.00) Encounter for Medicare annual wellness exam  (primary encounter " "diagnosis)  Comment:   Plan:     (E66.01) Morbid obesity (H)  Comment:   Plan:   Wt Readings from Last 5 Encounters:   11/16/22 87.5 kg (193 lb)   12/21/21 88.5 kg (195 lb)   12/09/21 88.5 kg (195 lb)   12/06/21 88.5 kg (195 lb)   11/16/21 88.7 kg (195 lb 9.6 oz)     Portion control     (M25.561,  M25.562,  G89.29) Chronic pain of both knees  Comment:   Plan: celecoxib (CELEBREX) 100 MG capsule        This is working better     (I10) Essential hypertension  Comment:   Plan: lisinopril (ZESTRIL) 10 MG tablet        Well controlled labsnormal             COUNSELING:  Reviewed preventive health counseling, as reflected in patient instructions      BMI:   Estimated body mass index is 33.65 kg/m  as calculated from the following:    Height as of this encounter: 1.613 m (5' 3.5\").    Weight as of this encounter: 87.5 kg (193 lb).   Weight management plan: Discussed healthy diet and exercise guidelines      She reports that she has quit smoking. Her smoking use included cigarettes. She has quit using smokeless tobacco.      Appropriate preventive services were discussed with this patient, including applicable screening as appropriate for cardiovascular disease, diabetes, osteopenia/osteoporosis, and glaucoma.  As appropriate for age/gender, discussed screening for colorectal cancer, prostate cancer, breast cancer, and cervical cancer. Checklist reviewing preventive services available has been given to the patient.    Reviewed patients plan of care and provided an AVS. The  naveen Gaona meets the Care Plan requirement. This Care Plan has been established and reviewed with the Patient.      Mari Greene MD  [facility]    Identified Health Risks:    The patient was provided with written information regarding signs of hearing loss.  Information on urinary incontinence and treatment options given to patient.      Mari Greene M.D.    "

## 2022-12-09 ENCOUNTER — TRANSFERRED RECORDS (OUTPATIENT)
Dept: HEALTH INFORMATION MANAGEMENT | Facility: CLINIC | Age: 77
End: 2022-12-09

## 2023-01-09 ENCOUNTER — TELEPHONE (OUTPATIENT)
Dept: FAMILY MEDICINE | Facility: CLINIC | Age: 78
End: 2023-01-09

## 2023-01-09 DIAGNOSIS — M25.562 CHRONIC PAIN OF BOTH KNEES: ICD-10-CM

## 2023-01-09 DIAGNOSIS — G89.29 CHRONIC PAIN OF BOTH KNEES: ICD-10-CM

## 2023-01-09 DIAGNOSIS — E78.2 MIXED HYPERLIPIDEMIA: ICD-10-CM

## 2023-01-09 DIAGNOSIS — M25.561 CHRONIC PAIN OF BOTH KNEES: ICD-10-CM

## 2023-01-09 DIAGNOSIS — F33.40 RECURRENT MAJOR DEPRESSION IN REMISSION (H): ICD-10-CM

## 2023-01-09 DIAGNOSIS — I10 ESSENTIAL HYPERTENSION: ICD-10-CM

## 2023-01-09 NOTE — TELEPHONE ENCOUNTER
[facility] called and needs all [facility].      Sandra Kumari, MIYA Lundy

## 2023-01-11 RX ORDER — SIMVASTATIN 10 MG
10 TABLET ORAL AT BEDTIME
Qty: 90 TABLET | Refills: 3 | Status: SHIPPED | OUTPATIENT
Start: 2023-01-11 | End: 2023-11-29

## 2023-01-11 RX ORDER — CELECOXIB 100 MG/1
CAPSULE ORAL
Qty: 180 CAPSULE | Refills: 3 | Status: SHIPPED | OUTPATIENT
Start: 2023-01-11 | End: 2023-11-29

## 2023-01-11 RX ORDER — TRIAMTERENE AND HYDROCHLOROTHIAZIDE 75; 50 MG/1; MG/1
1 TABLET ORAL DAILY
Qty: 90 TABLET | Refills: 1 | Status: SHIPPED | OUTPATIENT
Start: 2023-01-11 | End: 2023-08-29

## 2023-01-11 RX ORDER — LISINOPRIL 10 MG/1
10 TABLET ORAL DAILY
Qty: 90 TABLET | Refills: 3 | Status: SHIPPED | OUTPATIENT
Start: 2023-01-11 | End: 2023-08-29

## 2023-01-11 RX ORDER — NORTRIPTYLINE HCL 10 MG
CAPSULE ORAL
Qty: 90 CAPSULE | Refills: 1 | Status: SHIPPED | OUTPATIENT
Start: 2023-01-11 | End: 2023-07-17

## 2023-01-17 ENCOUNTER — TELEPHONE (OUTPATIENT)
Dept: FAMILY MEDICINE | Facility: CLINIC | Age: 78
End: 2023-01-17

## 2023-01-17 NOTE — TELEPHONE ENCOUNTER
I understand that this is a potential interaction.  I have been aware of that.  She has tolerated it well for a very long time.  So I want her to have both of these thank you. Mari Greene M.D.

## 2023-01-17 NOTE — TELEPHONE ENCOUNTER
Ariana is calling from [facility] to report a potential drug interaction with Fluoxitine and Nortriptyline. Can cause Serotonin Syndrome and prolonged QT.     Reference # [id number].    Will route to ordering provider, Dr. Greene and care team.    Isis Danielle RN

## 2023-01-18 NOTE — TELEPHONE ENCOUNTER
Call placed to [facility]   Spoke with Pharmacist     Relayed Dr. Greene's message  Pharmacist verbalized understanding  No further questions/concerns    Camacho Vaca RN

## 2023-07-17 DIAGNOSIS — F33.40 RECURRENT MAJOR DEPRESSION IN REMISSION (H): ICD-10-CM

## 2023-07-17 RX ORDER — NORTRIPTYLINE HCL 10 MG
CAPSULE ORAL
Qty: 90 CAPSULE | Refills: 3 | Status: SHIPPED | OUTPATIENT
Start: 2023-07-17 | End: 2024-07-11

## 2023-07-17 NOTE — TELEPHONE ENCOUNTER
"Routing refill request to provider for review/approval because:  PHQ9 due      Requested Prescriptions   Pending Prescriptions Disp Refills    nortriptyline (PAMELOR) 10 MG capsule [Pharmacy Med Name: NORTRIPTYLINE CAPS 10MG] 90 capsule 3     Sig: TAKE 1 CAPSULE AT BEDTIME       Tricyclic Agents ( Annual appt and no PHQ9) Passed - 7/17/2023 10:34 AM        Passed - Blood Pressure under 140/90 in past 12 mos     BP Readings from Last 3 Encounters:   11/16/22 130/82   12/21/21 138/72   12/09/21 120/74                 Passed - Recent (12 mo) or future (30 days) visit within authorizing provider's specialty     Patient has had an office visit with the authorizing provider or a provider within the authorizing providers department within the previous 12 mos or has a future within next 30 days. See \"Patient Info\" tab in inbasket, or \"Choose Columns\" in Meds & Orders section of the refill encounter.              Passed - Medication is active on med list        Passed - Patient is age 18 or older        Passed - Patient is not pregnant        Passed - No positive pregnancy test on record in past 12 mos                 Camacho Vaca, RN 07/17/23 1:19 PM   "

## 2023-07-24 ENCOUNTER — TELEPHONE (OUTPATIENT)
Dept: FAMILY MEDICINE | Facility: CLINIC | Age: 78
End: 2023-07-24
Payer: COMMERCIAL

## 2023-07-24 NOTE — TELEPHONE ENCOUNTER
Forwarding to PCP.  Okay to schedule patient for an in-person visit in one of your virtual slots this week?    Patient calls with vaginal/skin concern.  She noted a lump about the size of a pencil eraser on her perineum a few days ago, sounds like on labia majora near vaginal opening.  It had a white center, which came off with bathing, and she drained a small amount of whitish/yellowish semi-solid fluid (like a pimple), now seems to be red and somewhat painful.  At home she has applied Vagisil, nystatin cream and triamcinolone.  She has not had any sexual intercourse since spouse passed about 6 years ago. She not sure if it's due to wearing pads for urinary leakage, etc.   She's wanting to get this checked out.    RN only sees virtual slots available on PCP's schedule this week.  Patient would like to see PCP, so will forward to see if one of these slots can be utilized for an in-person visit.     Denise Brown RN  [facility]

## 2023-07-25 NOTE — TELEPHONE ENCOUNTER
Noted and patient scheduled for 9am tomorrow with PCP on a double-book.  Patient will arrive around 8:45am.    Denise Brown RN  [facility]

## 2023-07-26 ENCOUNTER — OFFICE VISIT (OUTPATIENT)
Dept: FAMILY MEDICINE | Facility: CLINIC | Age: 78
End: 2023-07-26
Payer: COMMERCIAL

## 2023-07-26 VITALS
WEIGHT: 194.3 LBS | HEIGHT: 64 IN | TEMPERATURE: 97.8 F | OXYGEN SATURATION: 97 % | HEART RATE: 79 BPM | DIASTOLIC BLOOD PRESSURE: 78 MMHG | RESPIRATION RATE: 14 BRPM | SYSTOLIC BLOOD PRESSURE: 120 MMHG | BODY MASS INDEX: 33.17 KG/M2

## 2023-07-26 DIAGNOSIS — L73.9 HAIR FOLLICLE INFECTION: Primary | ICD-10-CM

## 2023-07-26 DIAGNOSIS — F33.40 RECURRENT MAJOR DEPRESSION IN REMISSION (H): ICD-10-CM

## 2023-07-26 DIAGNOSIS — E66.812 CLASS 2 OBESITY WITH ALVEOLAR HYPOVENTILATION, SERIOUS COMORBIDITY, AND BODY MASS INDEX (BMI) OF 35.0 TO 35.9 IN ADULT (H): ICD-10-CM

## 2023-07-26 DIAGNOSIS — E66.2 CLASS 2 OBESITY WITH ALVEOLAR HYPOVENTILATION, SERIOUS COMORBIDITY, AND BODY MASS INDEX (BMI) OF 35.0 TO 35.9 IN ADULT (H): ICD-10-CM

## 2023-07-26 PROCEDURE — 99213 OFFICE O/P EST LOW 20 MIN: CPT | Performed by: FAMILY MEDICINE

## 2023-07-26 RX ORDER — NYSTATIN 100000 U/G
CREAM TOPICAL 2 TIMES DAILY
Qty: 30 G | Refills: 1 | Status: SHIPPED | OUTPATIENT
Start: 2023-07-26

## 2023-07-26 RX ORDER — NYSTATIN 100000 U/G
CREAM TOPICAL 2 TIMES DAILY
COMMUNITY
End: 2023-07-26

## 2023-07-26 RX ORDER — TRIAMCINOLONE ACETONIDE 0.25 MG/G
OINTMENT TOPICAL
Qty: 15 G | Refills: 1 | Status: SHIPPED | OUTPATIENT
Start: 2023-07-26

## 2023-07-26 ASSESSMENT — PAIN SCALES - GENERAL: PAINLEVEL: MILD PAIN (2)

## 2023-07-26 ASSESSMENT — PATIENT HEALTH QUESTIONNAIRE - PHQ9
SUM OF ALL RESPONSES TO PHQ QUESTIONS 1-9: 7
10. IF YOU CHECKED OFF ANY PROBLEMS, HOW DIFFICULT HAVE THESE PROBLEMS MADE IT FOR YOU TO DO YOUR WORK, TAKE CARE OF THINGS AT HOME, OR GET ALONG WITH OTHER PEOPLE: SOMEWHAT DIFFICULT
SUM OF ALL RESPONSES TO PHQ QUESTIONS 1-9: 7

## 2023-07-26 NOTE — LETTER
My Depression Action Plan  Name: Candelaria Hewitt   Date of Birth 1945  Date: 7/26/2023    My doctor: Mari Greene   My clinic: [facility]  [address] [id number] [phone]            GREEN    ZONE   Good Control    What it looks like:   Things are going generally well. You have normal ups and downs. You may even feel depressed from time to time, but bad moods usually last less than a day.   What you need to do:  Continue to care for yourself (see self care plan)  Check your depression survival kit and update it as needed  Follow your physician s recommendations including any medication.  Do not stop taking medication unless you consult with your physician first.             YELLOW         ZONE Getting Worse    What it looks like:   Depression is starting to interfere with your life.   It may be hard to get out of bed; you may be starting to isolate yourself from others.  Symptoms of depression are starting to last most all day and this has happened for several days.   You may have suicidal thoughts but they are not constant.   What you need to do:     Call your care team. Your response to treatment will improve if you keep your care team informed of your progress. Yellow periods are signs an adjustment may need to be made.     Continue your self-care.  Just get dressed and ready for the day.  Don't give yourself time to talk yourself out of it.    Talk to someone in your support network.    Open up your Depression Self-Care Plan/Wellness Kit.             RED    ZONE Medical Alert - Get Help    What it looks like:   Depression is seriously interfering with your life.   You may experience these or other symptoms: You can t get out of bed most days, can t work or engage in other necessary activities, you have trouble taking care of basic hygiene, or basic responsibilities, thoughts of suicide or death that will not go away, self-injurious behavior.      What you need to do:  Call your care team and request a same-day appointment. If they are not available (weekends or after hours) call your local crisis line, emergency room or 911.          Depression Self-Care Plan / Wellness Kit    Many people find that medication and therapy are helpful treatments for managing depression. In addition, making small changes to your everyday life can help to boost your mood and improve your wellbeing. Below are some tips for you to consider. Be sure to talk with your medical provider and/or behavioral health consultant if your symptoms are worsening or not improving.     Sleep   Sleep hygiene  means all of the habits that support good, restful sleep. It includes maintaining a consistent bedtime and wake time, using your bedroom only for sleeping or sex, and keeping the bedroom dark and free of distractions like a computer, smartphone, or television.     Develop a Healthy Routine  Maintain good hygiene. Get out of bed in the morning, make your bed, brush your teeth, take a shower, and get dressed. Don t spend too much time viewing media that makes you feel stressed. Find time to relax each day.    Exercise  Get some form of exercise every day. This will help reduce pain and release endorphins, the  feel good  chemicals in your brain. It can be as simple as just going for a walk or doing some gardening, anything that will get you moving.      Diet  Strive to eat healthy foods, including fruits and vegetables. Drink plenty of water. Avoid excessive sugar, caffeine, alcohol, and other mood-altering substances.     Stay Connected with Others  Stay in touch with friends and family members.    Manage Your Mood  Try deep breathing, massage therapy, biofeedback, or meditation. Take part in fun activities when you can. Try to find something to smile about each day.     Psychotherapy  Be open to working with a therapist if your provider recommends it.     Medication  Be sure to take your  medication as prescribed. Most anti-depressants need to be taken every day. It usually takes several weeks for medications to work. Not all medicines work for all people. It is important to follow-up with your provider to make sure you have a treatment plan that is working for you. Do not stop your medication abruptly without first discussing it with your provider.    Crisis Resources   These hotlines are for both adults and children. They and are open 24 hours a day, 7 days a week unless noted otherwise.    National Suicide Prevention Lifeline   988 or 8-965-464-DULW (5164)    Crisis Text Line    www.crisistextline.org  Text HOME to 001466 from anywhere in the [place], anytime, about any type of crisis. A live, trained crisis counselor will receive the text and respond quickly.    [place] Lifeline for LGBTQ Youth  A national crisis intervention and suicide lifeline for LGBTQ youth under 25. Provides a safe place to talk without judgement. Call 1-854.186.7776; text START to 120036 or visit www.[employer].org to talk to a trained counselor.    For [facility] crisis numbers, visit the [facility] website at:  https://[place].gov/dhs/people-we-serve/adults/health-care/mental-health/resources/crisis-contacts.jsp

## 2023-07-26 NOTE — PROGRESS NOTES
"  Assessment & Plan     Hair follicle infection  Discussed what this is and Please see clinical references for patient education.    - triamcinolone (KENALOG) 0.025 % external ointment; 1 APPLICATION TWICE A DAY AS NEEDED TOPICALLY APPLY TO AFFECTED areas  - nystatin (MYCOSTATIN) 552780 UNIT/GM external cream; Apply topically 2 times daily To affected area as needed    Recurrent major depression in remission (H)  Doing well     Class 2 obesity with alveolar hypoventilation, serious comorbidity, and body mass index (BMI) of 35.0 to 35.9 in adult (H)  Working on it. Walking more        BMI:   Estimated body mass index is 33.88 kg/m  as calculated from the following:    Height as of this encounter: 1.613 m (5' 3.5\").    Weight as of this encounter: 88.1 kg (194 lb 4.8 oz).   Weight management plan: Discussed healthy diet and exercise guidelines    FUTURE APPOINTMENTS:       - Follow-up for annual visit or as needed    Mari Greene MD  [facility] EZEKIEL Gaona is a 77 year old, presenting for the following health issues:  No chief complaint on file.       No data to display              History of Present Illness       Reason for visit:  Pimple or boil near vaginal opening  Symptom onset:  1-3 days ago  Symptoms include:  Soreness  Symptom intensity:  Moderate  Symptom progression:  Staying the same  Had these symptoms before:  No  What makes it worse:  Internitten back pain or knee pain  What makes it better:  Stretching and movement    She eats 2-3 servings of fruits and vegetables daily.She consumes 1 sweetened beverage(s) daily.She exercises with enough effort to increase her heart rate 9 or less minutes per day.  She exercises with enough effort to increase her heart rate 3 or less days per week.   She is taking medications regularly.         She looked at it with a mirror and it looked likea pimple she put nystatin and triamcinolone and it helps         Review of Systems " "  Constitutional, HEENT, cardiovascular, pulmonary, gi and gu systems are negative, except as otherwise noted.      Objective    /78   Pulse 79   Temp 97.8  F (36.6  C) (Tympanic)   Resp 14   Ht 1.613 m (5' 3.5\")   Wt 88.1 kg (194 lb 4.8 oz)   LMP  (LMP Unknown)   SpO2 97%   BMI 33.88 kg/m    Body mass index is 33.88 kg/m .  Physical Exam   GENERAL: healthy, alert and no distress   (female): normal female external genitalia and 2 mm open area no infection    No results found for this or any previous visit (from the past 24 hour(s)).    Mari Greene M.D.                "

## 2023-08-29 ENCOUNTER — TELEPHONE (OUTPATIENT)
Dept: FAMILY MEDICINE | Facility: CLINIC | Age: 78
End: 2023-08-29
Payer: COMMERCIAL

## 2023-08-29 DIAGNOSIS — I10 ESSENTIAL HYPERTENSION: ICD-10-CM

## 2023-08-29 RX ORDER — LISINOPRIL 10 MG/1
10 TABLET ORAL DAILY
Qty: 90 TABLET | Refills: 3 | Status: SHIPPED | OUTPATIENT
Start: 2023-08-29 | End: 2024-01-15

## 2023-08-29 RX ORDER — TRIAMTERENE AND HYDROCHLOROTHIAZIDE 75; 50 MG/1; MG/1
1 TABLET ORAL DAILY
Qty: 90 TABLET | Refills: 1 | Status: SHIPPED | OUTPATIENT
Start: 2023-08-29 | End: 2023-11-29

## 2023-08-29 NOTE — TELEPHONE ENCOUNTER
Patient called and is asking for 2 of her meds to be sent to [facility] on [address].  This is a special request do to the patient will be traveling out of the [place] and will run out of theses meds before she gets back.      Lorena Kumari on 8/29/2023 at 11:51 AM

## 2023-08-29 NOTE — TELEPHONE ENCOUNTER
She needs to talk to the pharmacy about this. They will need to do an insurance override for this. Mari Greene M.D.

## 2023-11-08 ENCOUNTER — TELEPHONE (OUTPATIENT)
Dept: FAMILY MEDICINE | Facility: CLINIC | Age: 78
End: 2023-11-08
Payer: COMMERCIAL

## 2023-11-08 NOTE — TELEPHONE ENCOUNTER
Candelaria states that she has been coughing and feeling miserable since Saturday. She is having dark green drainage. States her chest hurts from coughing so much and is note getting any sleep at night due to this. She also has a headache but thinks that is from the coughing as well. She denies any shortness of breath or fever. She has not tested for Covid as of yet. Advised she be seen in urgent care to have her lungs listened to. She agrees with this plan.    Becca Lopez RN'

## 2023-11-29 ENCOUNTER — OFFICE VISIT (OUTPATIENT)
Dept: FAMILY MEDICINE | Facility: CLINIC | Age: 78
End: 2023-11-29
Payer: COMMERCIAL

## 2023-11-29 VITALS
TEMPERATURE: 98.1 F | OXYGEN SATURATION: 97 % | DIASTOLIC BLOOD PRESSURE: 76 MMHG | BODY MASS INDEX: 32.44 KG/M2 | HEIGHT: 64 IN | HEART RATE: 95 BPM | RESPIRATION RATE: 16 BRPM | SYSTOLIC BLOOD PRESSURE: 124 MMHG | WEIGHT: 190 LBS

## 2023-11-29 DIAGNOSIS — F33.40 RECURRENT MAJOR DEPRESSION IN REMISSION (H): ICD-10-CM

## 2023-11-29 DIAGNOSIS — I10 ESSENTIAL HYPERTENSION: ICD-10-CM

## 2023-11-29 DIAGNOSIS — E78.2 MIXED HYPERLIPIDEMIA: ICD-10-CM

## 2023-11-29 DIAGNOSIS — G89.29 CHRONIC PAIN OF BOTH KNEES: ICD-10-CM

## 2023-11-29 DIAGNOSIS — M25.561 CHRONIC PAIN OF BOTH KNEES: ICD-10-CM

## 2023-11-29 DIAGNOSIS — Z00.00 ENCOUNTER FOR PREVENTATIVE ADULT HEALTH CARE EXAMINATION: ICD-10-CM

## 2023-11-29 DIAGNOSIS — Z12.31 ENCOUNTER FOR SCREENING MAMMOGRAM FOR MALIGNANT NEOPLASM OF BREAST: Primary | ICD-10-CM

## 2023-11-29 DIAGNOSIS — M25.562 CHRONIC PAIN OF BOTH KNEES: ICD-10-CM

## 2023-11-29 DIAGNOSIS — R73.01 IMPAIRED FASTING GLUCOSE: ICD-10-CM

## 2023-11-29 LAB
ALBUMIN SERPL BCG-MCNC: 4.9 G/DL (ref 3.5–5.2)
ALP SERPL-CCNC: 103 U/L (ref 40–150)
ALT SERPL W P-5'-P-CCNC: 50 U/L (ref 0–50)
ANION GAP SERPL CALCULATED.3IONS-SCNC: 17 MMOL/L (ref 7–15)
AST SERPL W P-5'-P-CCNC: 40 U/L (ref 0–45)
BILIRUB SERPL-MCNC: 0.6 MG/DL
BUN SERPL-MCNC: 26.1 MG/DL (ref 8–23)
CALCIUM SERPL-MCNC: 9.7 MG/DL (ref 8.8–10.2)
CHLORIDE SERPL-SCNC: 98 MMOL/L (ref 98–107)
CREAT SERPL-MCNC: 0.78 MG/DL (ref 0.51–0.95)
DEPRECATED HCO3 PLAS-SCNC: 20 MMOL/L (ref 22–29)
EGFRCR SERPLBLD CKD-EPI 2021: 78 ML/MIN/1.73M2
GLUCOSE SERPL-MCNC: 102 MG/DL (ref 70–99)
HBA1C MFR BLD: 6.2 % (ref 0–5.6)
POTASSIUM SERPL-SCNC: 4.1 MMOL/L (ref 3.4–5.3)
PROT SERPL-MCNC: 7.9 G/DL (ref 6.4–8.3)
SODIUM SERPL-SCNC: 135 MMOL/L (ref 135–145)

## 2023-11-29 PROCEDURE — 83036 HEMOGLOBIN GLYCOSYLATED A1C: CPT | Performed by: FAMILY MEDICINE

## 2023-11-29 PROCEDURE — 80053 COMPREHEN METABOLIC PANEL: CPT | Performed by: FAMILY MEDICINE

## 2023-11-29 PROCEDURE — 36415 COLL VENOUS BLD VENIPUNCTURE: CPT | Performed by: FAMILY MEDICINE

## 2023-11-29 PROCEDURE — G0439 PPPS, SUBSEQ VISIT: HCPCS | Performed by: FAMILY MEDICINE

## 2023-11-29 PROCEDURE — 99214 OFFICE O/P EST MOD 30 MIN: CPT | Mod: 25 | Performed by: FAMILY MEDICINE

## 2023-11-29 RX ORDER — RESPIRATORY SYNCYTIAL VIRUS VACCINE 120MCG/0.5
0.5 KIT INTRAMUSCULAR ONCE
Qty: 1 EACH | Refills: 0 | Status: CANCELLED | OUTPATIENT
Start: 2023-11-29 | End: 2023-11-29

## 2023-11-29 RX ORDER — CELECOXIB 100 MG/1
CAPSULE ORAL
Qty: 180 CAPSULE | Refills: 3 | Status: SHIPPED | OUTPATIENT
Start: 2023-11-29 | End: 2024-01-15

## 2023-11-29 RX ORDER — TRIAMTERENE AND HYDROCHLOROTHIAZIDE 75; 50 MG/1; MG/1
1 TABLET ORAL DAILY
Qty: 90 TABLET | Refills: 1 | Status: SHIPPED | OUTPATIENT
Start: 2023-11-29 | End: 2024-01-15

## 2023-11-29 RX ORDER — SIMVASTATIN 10 MG
10 TABLET ORAL AT BEDTIME
Qty: 90 TABLET | Refills: 3 | Status: SHIPPED | OUTPATIENT
Start: 2023-11-29 | End: 2024-01-15

## 2023-11-29 ASSESSMENT — ENCOUNTER SYMPTOMS
ARTHRALGIAS: 0
HEMATURIA: 0
NAUSEA: 1
NERVOUS/ANXIOUS: 1
WEAKNESS: 0
HEMATOCHEZIA: 0
PARESTHESIAS: 1
DYSURIA: 0
COUGH: 1
BREAST MASS: 0
CHILLS: 0
FREQUENCY: 1
EYE PAIN: 0
DIZZINESS: 0
SORE THROAT: 0
DIARRHEA: 0
FEVER: 0
HEARTBURN: 1
JOINT SWELLING: 0
SHORTNESS OF BREATH: 1
PALPITATIONS: 0
HEADACHES: 0
CONSTIPATION: 0
MYALGIAS: 0
ABDOMINAL PAIN: 0

## 2023-11-29 ASSESSMENT — ANXIETY QUESTIONNAIRES
5. BEING SO RESTLESS THAT IT IS HARD TO SIT STILL: NOT AT ALL
4. TROUBLE RELAXING: SEVERAL DAYS
GAD7 TOTAL SCORE: 4
IF YOU CHECKED OFF ANY PROBLEMS ON THIS QUESTIONNAIRE, HOW DIFFICULT HAVE THESE PROBLEMS MADE IT FOR YOU TO DO YOUR WORK, TAKE CARE OF THINGS AT HOME, OR GET ALONG WITH OTHER PEOPLE: SOMEWHAT DIFFICULT
2. NOT BEING ABLE TO STOP OR CONTROL WORRYING: SEVERAL DAYS
1. FEELING NERVOUS, ANXIOUS, OR ON EDGE: SEVERAL DAYS
GAD7 TOTAL SCORE: 4
3. WORRYING TOO MUCH ABOUT DIFFERENT THINGS: SEVERAL DAYS
6. BECOMING EASILY ANNOYED OR IRRITABLE: NOT AT ALL
7. FEELING AFRAID AS IF SOMETHING AWFUL MIGHT HAPPEN: NOT AT ALL

## 2023-11-29 ASSESSMENT — ACTIVITIES OF DAILY LIVING (ADL): CURRENT_FUNCTION: NO ASSISTANCE NEEDED

## 2023-11-29 ASSESSMENT — PATIENT HEALTH QUESTIONNAIRE - PHQ9
SUM OF ALL RESPONSES TO PHQ QUESTIONS 1-9: 6
SUM OF ALL RESPONSES TO PHQ QUESTIONS 1-9: 6
10. IF YOU CHECKED OFF ANY PROBLEMS, HOW DIFFICULT HAVE THESE PROBLEMS MADE IT FOR YOU TO DO YOUR WORK, TAKE CARE OF THINGS AT HOME, OR GET ALONG WITH OTHER PEOPLE: SOMEWHAT DIFFICULT

## 2023-11-29 ASSESSMENT — PAIN SCALES - GENERAL: PAINLEVEL: NO PAIN (0)

## 2023-11-29 NOTE — PROGRESS NOTES
"SUBJECTIVE:   Candelaria is a 78 year old, presenting for the following:  Annual Visit        11/29/2023     9:53 AM   Additional Questions   Roomed by La Nena GRAY CMA       Are you in the first 12 months of your Medicare coverage?  No    Healthy Habits:     In general, how would you rate your overall health?  Good    Frequency of exercise:  4-5 days/week    Duration of exercise:  15-30 minutes    Do you usually eat at least 4 servings of fruit and vegetables a day, include whole grains    & fiber and avoid regularly eating high fat or \"junk\" foods?  Yes    Medication side effects:  Other    Ability to successfully perform activities of daily living:  No assistance needed    Home Safety:  No safety concerns identified    Hearing Impairment:  No hearing concerns    In the past 6 months, have you been bothered by leaking of urine?  No    In general, how would you rate your overall mental or emotional health?  Good    Additional concerns today:  No      Today's PHQ-9 Score:       11/29/2023     9:37 AM   PHQ-9 SCORE   PHQ-9 Total Score MyChart 6 (Mild depression)   PHQ-9 Total Score 6           Have you ever done Advance Care Planning? (For example, a Health Directive, POLST, or a discussion with a medical provider or your loved ones about your wishes): No, advance care planning information given to patient to review.  Patient declined advance care planning discussion at this time.       Fall risk  Fallen 2 or more times in the past year?: No  Any fall with injury in the past year?: No    Cognitive Screening   1) Repeat 3 items (Leader, Season, Table)    2) Clock draw: NORMAL  3) 3 item recall: Recalls 3 objects  Results: 3 items recalled: COGNITIVE IMPAIRMENT LESS LIKELY    Mini-CogTM Copyright FLORES Isbell. Licensed by the author for use in [facility]; reprinted with permission (elba@.[email or web address]). All rights reserved.      Do you have sleep apnea, excessive snoring or daytime drowsiness? : yes    Reviewed and updated " as needed this visit by clinical staff                  Reviewed and updated as needed this visit by Provider                 Social History     Tobacco Use    Smoking status: Former     Types: Cigarettes    Smokeless tobacco: Former   Substance Use Topics    Alcohol use: Yes     Alcohol/week: 1.0 standard drink of alcohol             11/29/2023     9:53 AM   Alcohol Use   Prescreen: >3 drinks/day or >7 drinks/week? No     Do you have a current opioid prescription? No  Do you use any other controlled substances or medications that are not prescribed by a provider? None              Current providers sharing in care for this patient include:   Patient Care Team:  Mari Greene MD as PCP - General (Family Medicine)  Mari Greene MD as Assigned PCP    The following health maintenance items are reviewed in Epic and correct as of today:  Health Maintenance   Topic Date Due    LUNG CANCER SCREENING  Never done    RSV VACCINE (Pregnancy & 60+) (1 - 1-dose 60+ series) Never done    INFLUENZA VACCINE (1) Never done    COVID-19 Vaccine (5 - 2023-24 season) 09/01/2023    ANNUAL REVIEW OF HM ORDERS  11/16/2023    MEDICARE ANNUAL WELLNESS VISIT  11/16/2023    PHQ-9  05/29/2024    FALL RISK ASSESSMENT  11/29/2024    LIPID  11/16/2026    ADVANCE CARE PLANNING  11/21/2027    DTAP/TDAP/TD IMMUNIZATION (4 - Td or Tdap) 02/09/2028    DEXA  04/26/2034    HEPATITIS C SCREENING  Completed    DEPRESSION ACTION PLAN  Completed    Pneumococcal Vaccine: 65+ Years  Completed    ZOSTER IMMUNIZATION  Completed    IPV IMMUNIZATION  Aged Out    HPV IMMUNIZATION  Aged Out    MENINGITIS IMMUNIZATION  Aged Out    RSV MONOCLONAL ANTIBODY  Aged Out    MAMMO SCREENING  Discontinued    COLORECTAL CANCER SCREENING  Discontinued     Lab work is in process  Mammogram Screening: Mammogram Screening - Patient over age 75, has elected to continue with screening.      Pertinent mammograms are reviewed under the imaging tab.    Review of Systems  "  Constitutional:  Negative for chills and fever.   HENT:  Negative for congestion, ear pain, hearing loss and sore throat.    Eyes:  Negative for pain and visual disturbance.   Respiratory:  Positive for cough and shortness of breath.    Cardiovascular:  Negative for chest pain, palpitations and peripheral edema.   Gastrointestinal:  Positive for heartburn and nausea. Negative for abdominal pain, constipation, diarrhea and hematochezia.   Breasts:  Negative for tenderness, breast mass and discharge.   Genitourinary:  Positive for frequency and urgency. Negative for dysuria, genital sores, hematuria, pelvic pain, vaginal bleeding and vaginal discharge.   Musculoskeletal:  Negative for arthralgias, joint swelling and myalgias.   Skin:  Negative for rash.   Neurological:  Positive for paresthesias. Negative for dizziness, weakness and headaches.   Psychiatric/Behavioral:  Negative for mood changes. The patient is nervous/anxious.      She has noted a shortness of breath when she has been walking faster she had a virus 3 weeks ago she is still   Blood pressure control is good taking the simvastatin   OBJECTIVE:   /76 (BP Location: Right arm, Patient Position: Sitting, Cuff Size: Adult Large)   Pulse 95   Temp 98.1  F (36.7  C) (Tympanic)   Resp 16   Ht 1.632 m (5' 4.25\")   Wt 86.2 kg (190 lb)   LMP  (LMP Unknown)   SpO2 97%   BMI 32.36 kg/m   Estimated body mass index is 33.88 kg/m  as calculated from the following:    Height as of 7/26/23: 1.613 m (5' 3.5\").    Weight as of 7/26/23: 88.1 kg (194 lb 4.8 oz).  Physical Exam  GENERAL: healthy, alert and no distress  EYES: Eyes grossly normal to inspection, PERRL and conjunctivae and sclerae normal  NECK: no adenopathy, no asymmetry, masses, or scars and thyroid normal to palpation  RESP: lungs clear to auscultation - no rales, rhonchi or wheezes  CV: regular rate and rhythm, normal S1 S2, no S3 or S4, no murmur, click or rub, no peripheral edema and " peripheral pulses strong  MS: no gross musculoskeletal defects noted, no edema  SKIN: no suspicious lesions or rashes  NEURO: Normal strength and tone, mentation intact and speech normal  PSYCH: mentation appears normal, affect normal/bright    Diagnostic Test Results:  Labs reviewed in Epic  No results found for this or any previous visit (from the past 24 hour(s)).    ASSESSMENT / PLAN:   (Z12.31) Encounter for screening mammogram for malignant neoplasm of breast  (primary encounter diagnosis)  Comment:   Plan: *MA Screening Digital Bilateral            (R73.01) Impaired fasting glucose  Comment:   Plan: Hemoglobin A1c, Comprehensive metabolic panel         (BMP + Alb, Alk Phos, ALT, AST, Total. Bili,         TP)            (M25.561,  M25.562,  G89.29) Chronic pain of both knees  Comment:   Plan: celecoxib (CELEBREX) 100 MG capsule            (F33.40) Recurrent major depression in remission (H24)  Comment:   Plan: FLUoxetine (PROZAC) 20 MG capsule        Doing well     (E78.2) Mixed hyperlipidemia  Comment:   Plan: simvastatin (ZOCOR) 10 MG tablet, Comprehensive        metabolic panel (BMP + Alb, Alk Phos, ALT, AST,        Total. Bili, TP)        Cont the medication    (I10) Essential hypertension  Comment: well controlled   Plan: triamterene-HCTZ (MAXZIDE) 75-50 MG tablet,         Comprehensive metabolic panel (BMP + Alb, Alk         Phos, ALT, AST, Total. Bili, TP)            Patient has been advised of split billing requirements and indicates understanding: Yes      COUNSELING:  Reviewed preventive health counseling, as reflected in patient instructions        She reports that she has quit smoking. Her smoking use included cigarettes. She has quit using smokeless tobacco.      Appropriate preventive services were discussed with this patient, including applicable screening as appropriate for fall prevention, nutrition, physical activity, Tobacco-use cessation, weight loss and cognition.  Checklist reviewing  preventive services available has been given to the patient.    Reviewed patients plan of care and provided an AVS. The Basic Care Plan (routine screening as documented in Health Maintenance) for Candelaria meets the Care Plan requirement. This Care Plan has been established and reviewed with the Patient.          Mari Greene MD  [facility]    Identified Health Risks:  I have reviewed Opioid Use Disorder and Substance Use Disorder risk factors and made any needed referrals.   Answers submitted by the patient for this visit:  Patient Health Questionnaire (Submitted on 11/29/2023)  If you checked off any problems, how difficult have these problems made it for you to do your work, take care of things at home, or get along with other people?: Somewhat difficult  PHQ9 TOTAL SCORE: 6  HARRISON-7 (Submitted on 11/29/2023)  HARRISON 7 TOTAL SCORE: 4

## 2023-12-14 ENCOUNTER — ANCILLARY PROCEDURE (OUTPATIENT)
Dept: MAMMOGRAPHY | Facility: CLINIC | Age: 78
End: 2023-12-14
Attending: FAMILY MEDICINE
Payer: COMMERCIAL

## 2023-12-14 DIAGNOSIS — Z12.31 ENCOUNTER FOR SCREENING MAMMOGRAM FOR MALIGNANT NEOPLASM OF BREAST: ICD-10-CM

## 2023-12-14 PROCEDURE — 77067 SCR MAMMO BI INCL CAD: CPT

## 2023-12-27 ENCOUNTER — TELEPHONE (OUTPATIENT)
Dept: FAMILY MEDICINE | Facility: CLINIC | Age: 78
End: 2023-12-27
Payer: COMMERCIAL

## 2023-12-27 DIAGNOSIS — U07.1 INFECTION DUE TO 2019 NOVEL CORONAVIRUS: Primary | ICD-10-CM

## 2023-12-27 NOTE — TELEPHONE ENCOUNTER
RN COVID TREATMENT VISIT  12/27/23      The patient has been triaged and does not require a higher level of care.    Candelaria Hewitt  78 year old  Current weight? 190lbs    Has the patient been seen by a primary care provider at an [facility] or [facility] within the past two years? Yes.   Have you been in close proximity to/do you have a known exposure to a person with a confirmed case of influenza? No.     General treatment eligibility:  Date of positive COVID test (PCR or at home)?  12/26/2023    Are you or have you been hospitalized for this COVID-19 infection? No.   Have you received monoclonal antibodies or antiviral treatment for COVID-19 since this positive test? No.   Do you have any of the following conditions that place you at risk of being very sick from COVID-19?   - Age 50 years or older  - Heart conditions such as cardiomyopathies, congenital heart defects, coronary artery disease, heart arrhythmias, heart failure, hypertension, valve disorders   - Current or former smoker  Yes, patient has at least one high risk condition as noted above.     Current COVID symptoms:   - fever or chills  - cough  - headache  - congestion or runny nose  Yes. Patient has at least one symptom as selected.     How many days since symptoms started? 5 days or less. Established patient, 12 years or older weighing at least 88.2 lbs, who has symptoms that started in the past 5 days, has not been hospitalized nor received treatment already, and is at risk for being very sick from COVID-19.     Treatment eligibility by RN:  Are you currently pregnant or nursing? No  Do you have a clinically significant hypersensitivity to nirmatrelvir or ritonavir, or toxic epidermal necrolysis (TEN) or Rodriguez-Robert Syndrome? No  Do you have a history of hepatitis, any hepatic impairment on the Problem List (such as Child-Mehta Class C, cirrhosis, fatty liver disease, alcoholic liver disease), or was the last liver  lab (hepatic panel, ALT, AST, ALK Phos, bilirubin) elevated in the past 6 months? No  Do you have any history of severe renal impairment (eGFR < 30mL/min)? No    Is patient eligible to continue? Yes, patient meets all eligibility requirements for the RN COVID treatment (as denoted by all no responses above).     Current Outpatient Medications   Medication Sig Dispense Refill    celecoxib (CELEBREX) 100 MG capsule TAKE 1 CAPSULE(100 MG) BY MOUTH TWICE DAILY Strength: 100 mg 180 capsule 3    famotidine (PEPCID) 20 MG tablet Take 20 mg by mouth daily      FLUoxetine (PROZAC) 20 MG capsule Take 1 capsule (20 mg) by mouth daily 90 capsule 3    lisinopril (ZESTRIL) 10 MG tablet Take 1 tablet (10 mg) by mouth daily 90 tablet 3    nortriptyline (PAMELOR) 10 MG capsule TAKE 1 CAPSULE AT BEDTIME 90 capsule 3    nystatin (MYCOSTATIN) 867946 UNIT/GM external cream Apply topically 2 times daily To affected area as needed 30 g 1    polyethylene glycol-propylene glycol (SYSTANE ULTRA) 0.4-0.3 % SOLN ophthalmic solution Place 1 drop into both eyes every hour as needed for dry eyes      simvastatin (ZOCOR) 10 MG tablet Take 1 tablet (10 mg) by mouth at bedtime 90 tablet 3    triamcinolone (KENALOG) 0.025 % external ointment 1 APPLICATION TWICE A DAY AS NEEDED TOPICALLY APPLY TO AFFECTED areas 15 g 1    triamterene-HCTZ (MAXZIDE) 75-50 MG tablet Take 1 tablet by mouth daily 90 tablet 1    vitamin D3 (VITAMIN D3) 50 mcg (2000 units) tablet Take 4,000 Units by mouth daily          Medications from List 1 of the standing order (on medications that exclude the use of Paxlovid) that patient is taking: NONE. Is patient taking Nanwalek's Wort? No  Is patient taking Nanwalek's Wort or any meds from List 1? No.   Medications from List 2 of the standing order (on meds that provider needs to adjust) that patient is taking: NONE. Is patient on any of the meds from List 2? No.   Medications from List 3 of standing order (on meds that a RN needs  to adjust) that patient is taking: simvastatin (Zocor, FloLipid): Instructed patient to stop taking simvastatin while taking Paxlovid and first dose of Paxlovid must be at least 12 hours after last dose of simvastatin.  Instructed to restart simvastatin 5 days after the completion of Paxlovid.  Is patient on any meds from List 3? Yes. Patient is on meds from list 3. No meds require a provider visit and at least one med required RN to adjust.     Paxlovid has an approximate 90% reduction in hospitalization. Paxlovid can possibly cause altered sense of taste, diarrhea (loose, watery stools), high blood pressure, muscle aches.     Would patient like a Paxlovid prescription?   Yes.   Lab Results   Component Value Date    GFRESTIMATED 78 11/29/2023       Was last eGFR reduced? No, eGFR 60 or greater/ No Result on record. Patient can receive the normal renal function dose. Paxlovid Rx sent to [facility] in [facility] on [address]     Temporary change to home medications: Holding Simvastatin - discussed with patient     All medication adjustments (holds, etc) were discussed with the patient and patient was asked to repeat back (teachback) their med adjustment.  Did patient understand med adjustment? Yes, patient repeated back and understood correctly.      Reviewed the following instructions with the patient:    Paxlovid (nimatrelvir and ritonavir)    How it works  Two medicines (nirmatrelvir and ritonavir) are taken together. They stop the virus from growing. Less amount of virus is easier for your body to fight.    How to take  Medicine comes in a daily container with both medicine tablets. Take by mouth twice daily (once in the morning, once at night) for 5 days.  The number of tablets to take varies by patient.  Don't chew or break capsules. Swallow whole.    When to take  Take as soon as possible after positive COVID-19 test result, and within 5 days of your first symptoms.    Possible side  effects  Can cause altered sense of taste, diarrhea (loose, watery stools), high blood pressure, muscle aches.    Camacho Vaca RN

## 2023-12-27 NOTE — TELEPHONE ENCOUNTER
Patient wanted provider to review lab results from 11/29/2023 and provide interpretation of results  Lab results mailed per patient request    Camacho Vaca, Clinic RN  [facility]

## 2023-12-27 NOTE — TELEPHONE ENCOUNTER
There are no significant abnormalities. There is nothing to explain as none of it has significance looks like she hyyperventilated a little to lower the co2 anion gap is meaningless and she was a smidge deyhdrated for the urea you can mail her this Mari Greene M.D.

## 2024-01-12 DIAGNOSIS — E78.2 MIXED HYPERLIPIDEMIA: ICD-10-CM

## 2024-01-12 DIAGNOSIS — M25.561 CHRONIC PAIN OF BOTH KNEES: ICD-10-CM

## 2024-01-12 DIAGNOSIS — M25.562 CHRONIC PAIN OF BOTH KNEES: ICD-10-CM

## 2024-01-12 DIAGNOSIS — F33.40 RECURRENT MAJOR DEPRESSION IN REMISSION (H): ICD-10-CM

## 2024-01-12 DIAGNOSIS — G89.29 CHRONIC PAIN OF BOTH KNEES: ICD-10-CM

## 2024-01-12 DIAGNOSIS — I10 ESSENTIAL HYPERTENSION: ICD-10-CM

## 2024-01-15 ENCOUNTER — NURSE TRIAGE (OUTPATIENT)
Dept: FAMILY MEDICINE | Facility: CLINIC | Age: 79
End: 2024-01-15
Payer: COMMERCIAL

## 2024-01-15 RX ORDER — CELECOXIB 100 MG/1
CAPSULE ORAL
Qty: 180 CAPSULE | Refills: 2 | Status: SHIPPED | OUTPATIENT
Start: 2024-01-15

## 2024-01-15 RX ORDER — SIMVASTATIN 10 MG
10 TABLET ORAL AT BEDTIME
Qty: 90 TABLET | Refills: 2 | Status: SHIPPED | OUTPATIENT
Start: 2024-01-15

## 2024-01-15 RX ORDER — LISINOPRIL 10 MG/1
10 TABLET ORAL DAILY
Qty: 90 TABLET | Refills: 2 | Status: SHIPPED | OUTPATIENT
Start: 2024-01-15 | End: 2024-09-23

## 2024-01-15 RX ORDER — TRIAMTERENE AND HYDROCHLOROTHIAZIDE 75; 50 MG/1; MG/1
1 TABLET ORAL DAILY
Qty: 90 TABLET | Refills: 2 | Status: SHIPPED | OUTPATIENT
Start: 2024-01-15 | End: 2024-09-23

## 2024-01-15 NOTE — TELEPHONE ENCOUNTER
Nurse Triage SBAR    Is this a 2nd Level Triage? YES, LICENSED PRACTITIONER REVIEW IS REQUIRED    Situation: Patient has a cough that has worsened in the last week.    Background: Patient tested + for COVID on 12/26/2023. She had symptoms of cough and fatigue for about 10 days. Her cough improved. Last Wednesday 1/10/2024, her cough worsened and her fatigue returned     Assessment: Patient denies having a productive cough. She coughs very hard and her cough feels deep. RN asked if patient has had a fever today. She said she has felt warm around lunch time but today is the first day she has really gotten out of bed and done things around the house plus she had soup for lunch. Does not feel warm now. Patient stated she gets a midsternum chest pain during coughing and immediately after a coughing fit. SOB after a coughing fit and slight SOB when walking around. No SOB at rest. Pulse does not feel like it is racing. She lives with her child, their spouse, and their children. She said it seems like the adults are the ones who are now getting worse. Children seem like they have improved.  Daughter in law was seen last week and was negative for Flu and COVID.  Patient has had her RSV vaccine.   Not able to take certain cough medications due to HTN.   Has been drinking tea with honey.      Protocol Recommended Disposition:   Go To Office Now    Recommendation: RN reviewed s/s to go to the ED. RN also scheduled an appointment for tomorrow in case provider feels like it can wait for an in clinic visit instead.      Routed to provider    Does the patient meet one of the following criteria for ADS visit consideration? 16+ years old, with an [facility] PCP     TIP  Providers, please consider if this condition is appropriate for management at one of our Acute and Diagnostic Services sites.     If patient is a good candidate, please use dotphrase <dot>triageresponse and select Refer to ADS to document.   Reason for Disposition   MILD  difficulty breathing (e.g., minimal/no SOB at rest, SOB with walking, pulse <100) and still present when not coughing    Protocols used: Cough-A-OH    Connie Ayala RN on 1/15/2024 at 4:34 PM

## 2024-01-15 NOTE — TELEPHONE ENCOUNTER
Noted. Patient aware of appointment tomorrow.   Aware of s/s to go to the ED.  Connie yAala RN on 1/15/2024 at 5:47 PM

## 2024-01-15 NOTE — TELEPHONE ENCOUNTER
This sounds okay for office visit tomorrow. Sounds like today is actually better than it has been in the past week. Agree with giving signs to be seen in ER overnight if worsening.  Paige Adamson PA-C

## 2024-01-23 ENCOUNTER — OFFICE VISIT (OUTPATIENT)
Dept: FAMILY MEDICINE | Facility: CLINIC | Age: 79
End: 2024-01-23
Payer: COMMERCIAL

## 2024-01-23 VITALS
OXYGEN SATURATION: 98 % | BODY MASS INDEX: 32.61 KG/M2 | SYSTOLIC BLOOD PRESSURE: 112 MMHG | TEMPERATURE: 98.5 F | WEIGHT: 191 LBS | HEART RATE: 95 BPM | DIASTOLIC BLOOD PRESSURE: 64 MMHG | HEIGHT: 64 IN

## 2024-01-23 DIAGNOSIS — F33.40 RECURRENT MAJOR DEPRESSION IN REMISSION (H): ICD-10-CM

## 2024-01-23 DIAGNOSIS — E66.09 CLASS 1 OBESITY DUE TO EXCESS CALORIES WITH SERIOUS COMORBIDITY AND BODY MASS INDEX (BMI) OF 32.0 TO 32.9 IN ADULT: Primary | ICD-10-CM

## 2024-01-23 DIAGNOSIS — R05.2 SUBACUTE COUGH: ICD-10-CM

## 2024-01-23 DIAGNOSIS — E66.811 CLASS 1 OBESITY DUE TO EXCESS CALORIES WITH SERIOUS COMORBIDITY AND BODY MASS INDEX (BMI) OF 32.0 TO 32.9 IN ADULT: Primary | ICD-10-CM

## 2024-01-23 PROBLEM — E66.01 MORBID OBESITY (H): Status: RESOLVED | Noted: 2021-06-02 | Resolved: 2024-01-23

## 2024-01-23 PROCEDURE — 99213 OFFICE O/P EST LOW 20 MIN: CPT | Performed by: FAMILY MEDICINE

## 2024-01-23 ASSESSMENT — PATIENT HEALTH QUESTIONNAIRE - PHQ9
10. IF YOU CHECKED OFF ANY PROBLEMS, HOW DIFFICULT HAVE THESE PROBLEMS MADE IT FOR YOU TO DO YOUR WORK, TAKE CARE OF THINGS AT HOME, OR GET ALONG WITH OTHER PEOPLE: SOMEWHAT DIFFICULT
SUM OF ALL RESPONSES TO PHQ QUESTIONS 1-9: 4
SUM OF ALL RESPONSES TO PHQ QUESTIONS 1-9: 4

## 2024-01-23 ASSESSMENT — ENCOUNTER SYMPTOMS: COUGH: 1

## 2024-01-23 NOTE — PROGRESS NOTES
"  Assessment & Plan     Recurrent major depression in remission (H24)  Well controlled     Class 1 obesity due to excess calories with serious comorbidity and body mass index (BMI) of 32.0 to 32.9 in adult  She is working on increasing her activity     Subacute cough  There are no Patient Instructions on file for this visit.      Please see clinical references for patient education.          BMI  Estimated body mass index is 32.53 kg/m  as calculated from the following:    Height as of this encounter: 1.632 m (5' 4.25\").    Weight as of this encounter: 86.6 kg (191 lb).       Please see clinical references for patient education.      Enedelia Gaona is a 78 year old, presenting for the following health issues:  Cough        1/23/2024     1:53 PM   Additional Questions   Roomed by Kristina EID CMA     Cough    History of Present Illness       Reason for visit:  Follow up to [facility] visit last week    She eats 4 or more servings of fruits and vegetables daily.She consumes 1 sweetened beverage(s) daily.She exercises with enough effort to increase her heart rate 10 to 19 minutes per day.  She exercises with enough effort to increase her heart rate 3 or less days per week.   She is taking medications regularly.         ED/UC Followup:  Facility:  [facility]  Date of visit: 1/16/24  Reason for visit: Bronchitis  Current Status: much better   She went to the [facility] room for the cough and tight chest       She had covid after melissa doing much       Review of Systems  Constitutional, HEENT, cardiovascular, pulmonary, gi and gu systems are negative, except as otherwise noted.      Objective    /64 (BP Location: Right arm, Patient Position: Sitting, Cuff Size: Adult Large)   Pulse 95   Temp 98.5  F (36.9  C) (Tympanic)   Ht 1.632 m (5' 4.25\")   Wt 86.6 kg (191 lb)   LMP  (LMP Unknown)   SpO2 98%   BMI 32.53 kg/m    Body mass index is 32.53 kg/m .  Physical Exam   GENERAL: alert and no " distress  RESP: lungs clear to auscultation - no rales, rhonchi or wheezes  CV: regular rate and rhythm, normal S1 S2, no S3 or S4, no murmur, click or rub, no peripheral edema   PSYCH: mentation appears normal, affect normal/bright            Signed Electronically by: Mari Greene MD

## 2024-02-20 ENCOUNTER — TRANSFERRED RECORDS (OUTPATIENT)
Dept: HEALTH INFORMATION MANAGEMENT | Facility: CLINIC | Age: 79
End: 2024-02-20
Payer: COMMERCIAL

## 2024-04-08 ENCOUNTER — TRANSFERRED RECORDS (OUTPATIENT)
Dept: HEALTH INFORMATION MANAGEMENT | Facility: CLINIC | Age: 79
End: 2024-04-08
Payer: COMMERCIAL

## 2024-05-22 ENCOUNTER — TRANSFERRED RECORDS (OUTPATIENT)
Dept: HEALTH INFORMATION MANAGEMENT | Facility: CLINIC | Age: 79
End: 2024-05-22

## 2024-05-22 ENCOUNTER — OFFICE VISIT (OUTPATIENT)
Dept: FAMILY MEDICINE | Facility: CLINIC | Age: 79
End: 2024-05-22
Payer: COMMERCIAL

## 2024-05-22 VITALS
OXYGEN SATURATION: 95 % | HEART RATE: 92 BPM | WEIGHT: 195 LBS | SYSTOLIC BLOOD PRESSURE: 132 MMHG | HEIGHT: 64 IN | BODY MASS INDEX: 33.29 KG/M2 | TEMPERATURE: 98.9 F | DIASTOLIC BLOOD PRESSURE: 74 MMHG

## 2024-05-22 DIAGNOSIS — Z01.818 PREOP GENERAL PHYSICAL EXAM: Primary | ICD-10-CM

## 2024-05-22 DIAGNOSIS — G62.9 PERIPHERAL POLYNEUROPATHY: ICD-10-CM

## 2024-05-22 DIAGNOSIS — M17.11 PRIMARY OSTEOARTHRITIS OF RIGHT KNEE: ICD-10-CM

## 2024-05-22 DIAGNOSIS — R73.03 PREDIABETES: ICD-10-CM

## 2024-05-22 DIAGNOSIS — I10 ESSENTIAL HYPERTENSION: ICD-10-CM

## 2024-05-22 DIAGNOSIS — K21.00 GASTROESOPHAGEAL REFLUX DISEASE WITH ESOPHAGITIS WITHOUT HEMORRHAGE: ICD-10-CM

## 2024-05-22 LAB
ALBUMIN SERPL BCG-MCNC: 4.7 G/DL (ref 3.5–5.2)
ALP SERPL-CCNC: 96 U/L (ref 40–150)
ALT SERPL W P-5'-P-CCNC: 59 U/L (ref 0–50)
ANION GAP SERPL CALCULATED.3IONS-SCNC: 16 MMOL/L (ref 7–15)
AST SERPL W P-5'-P-CCNC: 53 U/L (ref 0–45)
BILIRUB SERPL-MCNC: 0.5 MG/DL
BUN SERPL-MCNC: 19.7 MG/DL (ref 8–23)
CALCIUM SERPL-MCNC: 10 MG/DL (ref 8.8–10.2)
CHLORIDE SERPL-SCNC: 97 MMOL/L (ref 98–107)
CREAT SERPL-MCNC: 0.72 MG/DL (ref 0.51–0.95)
DEPRECATED HCO3 PLAS-SCNC: 22 MMOL/L (ref 22–29)
EGFRCR SERPLBLD CKD-EPI 2021: 86 ML/MIN/1.73M2
ERYTHROCYTE [DISTWIDTH] IN BLOOD BY AUTOMATED COUNT: 12.9 % (ref 10–15)
GLUCOSE SERPL-MCNC: 104 MG/DL (ref 70–99)
HBA1C MFR BLD: 6.1 % (ref 0–5.6)
HCT VFR BLD AUTO: 46.1 % (ref 35–47)
HGB BLD-MCNC: 15.5 G/DL (ref 11.7–15.7)
MCH RBC QN AUTO: 29.8 PG (ref 26.5–33)
MCHC RBC AUTO-ENTMCNC: 33.6 G/DL (ref 31.5–36.5)
MCV RBC AUTO: 89 FL (ref 78–100)
PLATELET # BLD AUTO: 233 10E3/UL (ref 150–450)
POTASSIUM SERPL-SCNC: 4.5 MMOL/L (ref 3.4–5.3)
PROT SERPL-MCNC: 7.9 G/DL (ref 6.4–8.3)
RBC # BLD AUTO: 5.21 10E6/UL (ref 3.8–5.2)
SODIUM SERPL-SCNC: 135 MMOL/L (ref 135–145)
WBC # BLD AUTO: 5.6 10E3/UL (ref 4–11)

## 2024-05-22 PROCEDURE — 80053 COMPREHEN METABOLIC PANEL: CPT | Performed by: FAMILY MEDICINE

## 2024-05-22 PROCEDURE — 83036 HEMOGLOBIN GLYCOSYLATED A1C: CPT | Performed by: FAMILY MEDICINE

## 2024-05-22 PROCEDURE — 85027 COMPLETE CBC AUTOMATED: CPT | Performed by: FAMILY MEDICINE

## 2024-05-22 PROCEDURE — 93000 ELECTROCARDIOGRAM COMPLETE: CPT | Performed by: FAMILY MEDICINE

## 2024-05-22 PROCEDURE — 99214 OFFICE O/P EST MOD 30 MIN: CPT | Performed by: FAMILY MEDICINE

## 2024-05-22 PROCEDURE — 36415 COLL VENOUS BLD VENIPUNCTURE: CPT | Performed by: FAMILY MEDICINE

## 2024-05-22 NOTE — PROGRESS NOTES
Preoperative Evaluation  [facility]  [address]  [facility] [address]  Phone: [phone]  Primary Provider: Mari Greene MD  Pre-op Performing Provider: Mari Greene MD  May 22, 2024             5/22/2024   Surgical Information   What procedure is being done? rt total knee   Facility or Hospital where procedure/surgery will be performed: [facility]   Who is doing the procedure / surgery? Dr Dmitri Rodriguez   Date of surgery / procedure: June 18th 204   Time of surgery / procedure: not yet known   Where do you plan to recover after surgery? at home with family     Fax number for surgical facility: Note does not need to be faxed, will be available electronically in Epic.      Assessment & Plan     The proposed surgical procedure is considered INTERMEDIATE risk.    Preop general physical exam    - EKG 12-lead complete w/read - Clinics    Primary osteoarthritis of right knee  Reason for surgery    Peripheral polyneuropathy  Stable     Gastroesophageal reflux disease with esophagitis without hemorrhage  Stable     Essential hypertension  Well controlled     Prediabetes  Stable        Implanted Device none         - No identified additional risk factors other than previously addressed    Antiplatelet or Anticoagulation Medication Instructions   - aspirin: Discontinue aspirin 7-10 days prior to procedure to reduce bleeding risk. It should be resumed postoperatively.     Additional Medication Instructions  Take all scheduled medications on the day of surgery EXCEPT for modifications listed below:   - ACE/ARB: DO NOT TAKE on day of surgery (minimum 11 hours for general anesthesia).   - Diuretics: DO NOT TAKE on the day of surgery.    Recommendation  Approval given to proceed with proposed procedure, without further diagnostic evaluation.        Enedelia Gaona is a 78 year old, presenting for the following:  Pre-Op Exam        HPI related to upcoming procedure: advanced  osteoarthritis right knee        5/22/2024   Pre-Op Questionnaire   Have you ever had a heart attack or stroke? No   Have you ever had surgery on your heart or blood vessels, such as a stent placement, a coronary artery bypass, or surgery on an artery in your head, neck, heart, or legs? (!) YES vein stripping   Do you have chest pain with activity? No   Do you have a history of heart failure? No   Do you currently have a cold, bronchitis or symptoms of other infection? No   Do you have a cough, shortness of breath, or wheezing? No   Do you or anyone in your family have previous history of blood clots? No   Do you or does anyone in your family have a serious bleeding problem such as prolonged bleeding following surgeries or cuts? No   Have you ever had problems with anemia or been told to take iron pills? No   Have you had any abnormal blood loss such as black, tarry or bloody stools, or abnormal vaginal bleeding? No   Have you ever had a blood transfusion? No   Are you willing to have a blood transfusion if it is medically needed before, during, or after your surgery? Yes   Have you or any of your relatives ever had problems with anesthesia? (!) YES nausea   Do you have sleep apnea, excessive snoring or daytime drowsiness? (!) YES on cpap   Do you have a CPAP machine? Yes bring it    Do you have any artifical heart valves or other implanted medical devices like a pacemaker, defibrillator, or continuous glucose monitor? No   Do you have artificial joints? No   Are you allergic to latex? No     Health Care Directive  Patient does not have a Health Care Directive or Living Will: Discussed advance care planning with patient; information given to patient to review.    Preoperative Review of    reviewed - no record of controlled substances prescribed.      Status of Chronic Conditions:  See problem list for active medical problems.  Problems all longstanding and stable, except as noted/documented.  See ROS for  pertinent symptoms related to these conditions.    Patient Active Problem List    Diagnosis Date Noted    Peripheral polyneuropathy 11/16/2021     Priority: Medium    Prediabetes 11/16/2021     Priority: Medium    Atypical chest pain 08/27/2021     Priority: Medium    Gastroesophageal reflux disease with esophagitis without hemorrhage 08/27/2021     Priority: Medium    Varicose veins of both lower extremities with pain 07/06/2018     Priority: Medium    Recurrent major depression in remission (H24) 01/08/2015     Priority: Medium    KYLIE (obstructive sleep apnea) 07/22/2009     Priority: Medium     Overview:   With CPAP 7/22/2009  [facility]         Impaired fasting glucose 12/01/2005     Priority: Medium    Family history of malignant neoplasm of gastrointestinal tract 12/29/2004     Priority: Medium    Essential hypertension 06/23/2004     Priority: Medium     Overview:   Epic         Hyperlipidemia 06/23/2004     Priority: Medium    Adiposity 04/16/2004     Priority: Medium     Overview:   Epic           Past Medical History:   Diagnosis Date    Depression     Hyperlipidemia     Hypertension      Past Surgical History:   Procedure Laterality Date    ECTOPIC PREGNANCY SURGERY      HYSTERECTOMY       Current Outpatient Medications   Medication Sig Dispense Refill    celecoxib (CELEBREX) 100 MG capsule TAKE 1 CAPSULE TWICE A  capsule 2    famotidine (PEPCID) 20 MG tablet Take 20 mg by mouth daily      FLUoxetine (PROZAC) 20 MG capsule TAKE 1 CAPSULE DAILY 90 capsule 2    lisinopril (ZESTRIL) 10 MG tablet TAKE 1 TABLET DAILY 90 tablet 2    nortriptyline (PAMELOR) 10 MG capsule TAKE 1 CAPSULE AT BEDTIME 90 capsule 3    nystatin (MYCOSTATIN) 466416 UNIT/GM external cream Apply topically 2 times daily To affected area as needed 30 g 1    polyethylene glycol-propylene glycol (SYSTANE ULTRA) 0.4-0.3 % SOLN ophthalmic solution Place 1 drop into both eyes every hour as needed for dry eyes      simvastatin (ZOCOR) 10 MG  "tablet TAKE 1 TABLET AT BEDTIME 90 tablet 2    triamcinolone (KENALOG) 0.025 % external ointment 1 APPLICATION TWICE A DAY AS NEEDED TOPICALLY APPLY TO AFFECTED areas 15 g 1    triamterene-HCTZ (MAXZIDE) 75-50 MG tablet TAKE 1 TABLET DAILY 90 tablet 2    vitamin D3 (VITAMIN D3) 50 mcg (2000 units) tablet Take 4,000 Units by mouth daily          Allergies   Allergen Reactions    Haemophilus Influenzae      Other reaction(s): *Unknown    Influenza Vaccine Tri-Sp 09-10 [Influenza Vaccines] Unknown     Leg weakness for months, ? GBS    Morphine Nausea     Tolerate vicodin and codeine    Methocarbamol Rash        Social History     Tobacco Use    Smoking status: Former     Types: Cigarettes    Smokeless tobacco: Former   Substance Use Topics    Alcohol use: Yes     Alcohol/week: 1.0 standard drink of alcohol       History   Drug Use No             Review of Systems  Constitutional, HEENT, cardiovascular, pulmonary, gi and gu systems are negative, except as otherwise noted.    Objective    LMP  (LMP Unknown)    Estimated body mass index is 32.53 kg/m  as calculated from the following:    Height as of 1/23/24: 1.632 m (5' 4.25\").    Weight as of 1/23/24: 86.6 kg (191 lb).  Physical Exam  GENERAL: alert and no distress  NECK: no adenopathy, no asymmetry, masses, or scars  RESP: lungs clear to auscultation - no rales, rhonchi or wheezes  CV: regular rate and rhythm, normal S1 S2, no S3 or S4, no murmur, click or rub, no peripheral edema  SKIN: no suspicious lesions or rashes  NEURO: Normal strength and tone, mentation intact and speech normal  PSYCH: mentation appears normal, affect normal/bright    Recent Labs   Lab Test 11/29/23  1050      POTASSIUM 4.1   CR 0.78   A1C 6.2*        Diagnostics  Labs pending at this time.  Results will be reviewed when available.   EKG: appears normal, NSR, normal axis, normal intervals, no acute ST/T changes c/w ischemia, no LVH by voltage criteria, unchanged from previous " tracings    Revised Cardiac Risk Index (RCRI)  The patient has the following serious cardiovascular risks for perioperative complications:   - No serious cardiac risks = 0 points     RCRI Interpretation: 0 points: Class I (very low risk - 0.4% complication rate)         Signed Electronically by: Mari Greene MD  Copy of this evaluation report is provided to requesting physician.

## 2024-05-22 NOTE — PATIENT INSTRUCTIONS
How to Take Your Medication Before Surgery  Preoperative Medication Instructions   Antiplatelet or Anticoagulation Medication Instructions   - aspirin: 7 days        celebrex 3 days before     Additional Medication Instructions   - ACE/ARB: DO NOT TAKE on day of surgery (minimum 11 hours for general anesthesia).   - Diuretics: DO NOT TAKE on the day of surgery.       Patient Education   Preparing for Your Surgery  Getting started  A nurse will call you to review your health history and instructions. They will give you an arrival time based on your scheduled surgery time. Please be ready to share:  Your doctor's clinic name and phone number  Your medical, surgical, and anesthesia history  A list of allergies and sensitivities  A list of medicines, including herbal treatments and over-the-counter drugs  Whether the patient has a legal guardian (ask how to send us the papers in advance)  Please tell us if you're pregnant--or if there's any chance you might be pregnant. Some surgeries may injure a fetus (unborn baby), so they require a pregnancy test. Surgeries that are safe for a fetus don't always need a test, and you can choose whether to have one.   If you have a child who's having surgery, please ask for a copy of Preparing for Your Child's Surgery.    Preparing for surgery  Within 10 to 30 days of surgery: Have a pre-op exam (sometimes called an H&P, or History and Physical). This can be done at a clinic or pre-operative center.  If you're having a , you may not need this exam. Talk to your care team.  At your pre-op exam, talk to your care team about all medicines you take. If you need to stop any medicines before surgery, ask when to start taking them again.  We do this for your safety. Many medicines can make you bleed too much during surgery. Some change how well surgery (anesthesia) drugs work.  Call your insurance company to let them know you're having surgery. (If you don't have insurance, call  [phone].)  Call your clinic if there's any change in your health. This includes signs of a cold or flu (sore throat, runny nose, cough, rash, fever). It also includes a scrape or scratch near the surgery site.  If you have questions on the day of surgery, call your hospital or surgery center.  Eating and drinking guidelines  For your safety: Unless your surgeon tells you otherwise, follow the guidelines below.  Eat and drink as usual until 8 hours before you arrive for surgery. After that, no food or milk.  Drink clear liquids until 2 hours before you arrive. These are liquids you can see through, like water, Gatorade, and Propel Water. They also include plain black coffee and tea (no cream or milk), candy, and breath mints. You can spit out gum when you arrive.  If you drink alcohol: Stop drinking it the night before surgery.  If your care team tells you to take medicine on the morning of surgery, it's okay to take it with a sip of water.  Preventing infection  Shower or bathe the night before and morning of your surgery. Follow the instructions your clinic gave you. (If no instructions, use regular soap.)  Don't shave or clip hair near your surgery site. We'll remove the hair if needed.  Don't smoke or vape the morning of surgery. You may chew nicotine gum up to 2 hours before surgery. A nicotine patch is okay.  Note: Some surgeries require you to completely quit smoking and nicotine. Check with your surgeon.  Your care team will make every effort to keep you safe from infection. We will:  Clean our hands often with soap and water (or an alcohol-based hand rub).  Clean the skin at your surgery site with a special soap that kills germs.  Give you a special gown to keep you warm. (Cold raises the risk of infection.)  Wear special hair covers, masks, gowns and gloves during surgery.  Give antibiotic medicine, if prescribed. Not all surgeries need antibiotics.  What to bring on the day of surgery  Photo ID and  insurance card  Copy of your health care directive, if you have one  Glasses and hearing aids (bring cases)  You can't wear contacts during surgery  Inhaler and eye drops, if you use them (tell us about these when you arrive)  CPAP machine or breathing device, if you use them  A few personal items, if spending the night  If you have . . .  A pacemaker, ICD (cardiac defibrillator) or other implant: Bring the ID card.  An implanted stimulator: Bring the remote control.  A legal guardian: Bring a copy of the certified (court-stamped) guardianship papers.  Please remove any jewelry, including body piercings. Leave jewelry and other valuables at home.  If you're going home the day of surgery  You must have a responsible adult drive you home. They should stay with you overnight as well.  If you don't have someone to stay with you, and you aren't safe to go home alone, we may keep you overnight. Insurance often won't pay for this.  After surgery  If it's hard to control your pain or you need more pain medicine, please call your surgeon's office.  Questions?   If you have any questions for your care team, list them here: _________________________________________________________________________________________________________________________________________________________________________ ____________________________________ ____________________________________ ____________________________________  For informational purposes only. Not to replace the advice of your health care provider. Copyright   2003, 2019 [facility]. All rights reserved. Clinically reviewed by Cassandra Rose MD. Vino Volo [id number] - REV 12/22.

## 2024-07-08 DIAGNOSIS — F33.40 RECURRENT MAJOR DEPRESSION IN REMISSION (H): ICD-10-CM

## 2024-07-11 RX ORDER — NORTRIPTYLINE HCL 10 MG
CAPSULE ORAL
Qty: 90 CAPSULE | Refills: 0 | Status: SHIPPED | OUTPATIENT
Start: 2024-07-11

## 2024-08-21 ENCOUNTER — OFFICE VISIT (OUTPATIENT)
Dept: FAMILY MEDICINE | Facility: CLINIC | Age: 79
End: 2024-08-21
Payer: COMMERCIAL

## 2024-08-21 VITALS
BODY MASS INDEX: 33.12 KG/M2 | WEIGHT: 194 LBS | OXYGEN SATURATION: 98 % | SYSTOLIC BLOOD PRESSURE: 122 MMHG | HEART RATE: 85 BPM | HEIGHT: 64 IN | TEMPERATURE: 99 F | DIASTOLIC BLOOD PRESSURE: 68 MMHG

## 2024-08-21 DIAGNOSIS — E78.2 MIXED HYPERLIPIDEMIA: ICD-10-CM

## 2024-08-21 DIAGNOSIS — I10 BENIGN ESSENTIAL HTN: ICD-10-CM

## 2024-08-21 DIAGNOSIS — M17.11 PRIMARY OSTEOARTHRITIS OF RIGHT KNEE: ICD-10-CM

## 2024-08-21 DIAGNOSIS — Z01.818 PREOP GENERAL PHYSICAL EXAM: Primary | ICD-10-CM

## 2024-08-21 DIAGNOSIS — G47.33 OSA (OBSTRUCTIVE SLEEP APNEA): ICD-10-CM

## 2024-08-21 LAB
ALBUMIN SERPL BCG-MCNC: 4.7 G/DL (ref 3.5–5.2)
ALP SERPL-CCNC: 93 U/L (ref 40–150)
ALT SERPL W P-5'-P-CCNC: 61 U/L (ref 0–50)
ANION GAP SERPL CALCULATED.3IONS-SCNC: 12 MMOL/L (ref 7–15)
AST SERPL W P-5'-P-CCNC: 46 U/L (ref 0–45)
BILIRUB SERPL-MCNC: 0.7 MG/DL
BUN SERPL-MCNC: 22.2 MG/DL (ref 8–23)
CALCIUM SERPL-MCNC: 9.7 MG/DL (ref 8.8–10.4)
CHLORIDE SERPL-SCNC: 98 MMOL/L (ref 98–107)
CHOLEST SERPL-MCNC: 207 MG/DL
CREAT SERPL-MCNC: 1 MG/DL (ref 0.51–0.95)
EGFRCR SERPLBLD CKD-EPI 2021: 58 ML/MIN/1.73M2
ERYTHROCYTE [DISTWIDTH] IN BLOOD BY AUTOMATED COUNT: 12.9 % (ref 10–15)
FASTING STATUS PATIENT QL REPORTED: NO
FASTING STATUS PATIENT QL REPORTED: NO
GLUCOSE SERPL-MCNC: 120 MG/DL (ref 70–99)
HCO3 SERPL-SCNC: 25 MMOL/L (ref 22–29)
HCT VFR BLD AUTO: 44.2 % (ref 35–47)
HDLC SERPL-MCNC: 61 MG/DL
HGB BLD-MCNC: 14.8 G/DL (ref 11.7–15.7)
LDLC SERPL CALC-MCNC: 121 MG/DL
MCH RBC QN AUTO: 29.5 PG (ref 26.5–33)
MCHC RBC AUTO-ENTMCNC: 33.5 G/DL (ref 31.5–36.5)
MCV RBC AUTO: 88 FL (ref 78–100)
NONHDLC SERPL-MCNC: 146 MG/DL
PLATELET # BLD AUTO: 216 10E3/UL (ref 150–450)
POTASSIUM SERPL-SCNC: 4.3 MMOL/L (ref 3.4–5.3)
PROT SERPL-MCNC: 7.9 G/DL (ref 6.4–8.3)
RBC # BLD AUTO: 5.01 10E6/UL (ref 3.8–5.2)
SODIUM SERPL-SCNC: 135 MMOL/L (ref 135–145)
TRIGL SERPL-MCNC: 123 MG/DL
WBC # BLD AUTO: 5.8 10E3/UL (ref 4–11)

## 2024-08-21 PROCEDURE — 80061 LIPID PANEL: CPT | Performed by: FAMILY MEDICINE

## 2024-08-21 PROCEDURE — 80053 COMPREHEN METABOLIC PANEL: CPT | Performed by: FAMILY MEDICINE

## 2024-08-21 PROCEDURE — 85027 COMPLETE CBC AUTOMATED: CPT | Performed by: FAMILY MEDICINE

## 2024-08-21 PROCEDURE — 36415 COLL VENOUS BLD VENIPUNCTURE: CPT | Performed by: FAMILY MEDICINE

## 2024-08-21 PROCEDURE — 99214 OFFICE O/P EST MOD 30 MIN: CPT | Performed by: FAMILY MEDICINE

## 2024-08-21 RX ORDER — BENZONATATE 100 MG/1
100 CAPSULE ORAL 3 TIMES DAILY PRN
COMMUNITY
Start: 2024-01-16

## 2024-08-21 RX ORDER — COVID-19 ANTIGEN TEST
220 KIT MISCELLANEOUS 2 TIMES DAILY WITH MEALS
Status: ON HOLD | COMMUNITY
End: 2024-09-12

## 2024-08-21 RX ORDER — OMEPRAZOLE 40 MG/1
40 CAPSULE, DELAYED RELEASE ORAL DAILY
Status: ON HOLD | COMMUNITY
Start: 2018-11-21 | End: 2024-09-12

## 2024-08-21 RX ORDER — ALBUTEROL SULFATE 90 UG/1
2 AEROSOL, METERED RESPIRATORY (INHALATION) EVERY 6 HOURS PRN
COMMUNITY
Start: 2024-01-16

## 2024-08-21 RX ORDER — CALCIUM CARBONATE/VITAMIN D3 600 MG-10
1 TABLET ORAL 2 TIMES DAILY
COMMUNITY
Start: 2003-10-23

## 2024-08-21 RX ORDER — SODIUM PHOSPHATE,MONO-DIBASIC 19G-7G/118
1 ENEMA (ML) RECTAL DAILY
Status: ON HOLD | COMMUNITY
Start: 2004-06-14 | End: 2024-09-12

## 2024-08-21 RX ORDER — ACETAMINOPHEN 500 MG
500-1000 TABLET ORAL EVERY 6 HOURS PRN
COMMUNITY

## 2024-08-21 ASSESSMENT — PATIENT HEALTH QUESTIONNAIRE - PHQ9
10. IF YOU CHECKED OFF ANY PROBLEMS, HOW DIFFICULT HAVE THESE PROBLEMS MADE IT FOR YOU TO DO YOUR WORK, TAKE CARE OF THINGS AT HOME, OR GET ALONG WITH OTHER PEOPLE: NOT DIFFICULT AT ALL
SUM OF ALL RESPONSES TO PHQ QUESTIONS 1-9: 3
SUM OF ALL RESPONSES TO PHQ QUESTIONS 1-9: 3

## 2024-08-21 NOTE — PROGRESS NOTES
Preoperative Evaluation  [facility]  [address] [place] [address]  Phone: [phone]  Primary Provider: Mari Greene MD  Pre-op Performing Provider: Mari Greene MD  Aug 21, 2024               8/21/2024   Surgical Information   What procedure is being done? RIGHT TOTAL KNEE ARTHROPLASTY    Facility or Hospital where procedure/surgery will be performed: [facility]    Who is doing the procedure / surgery? Dr. Dmitri Rodriguez   Date of surgery / procedure: September 12th   Time of surgery / procedure: 7:20 am    Where do you plan to recover after surgery? at home with family        Fax number for surgical facility: Note does not need to be faxed, will be available electronically in Epic.    Assessment & Plan     The proposed surgical procedure is considered INTERMEDIATE risk.    Preop general physical exam      Mixed hyperlipidemia  Check today   - Lipid panel reflex to direct LDL Non-fasting; Future  - Comprehensive metabolic panel (BMP + Alb, Alk Phos, ALT, AST, Total. Bili, TP); Future  - Lipid panel reflex to direct LDL Non-fasting  - Comprehensive metabolic panel (BMP + Alb, Alk Phos, ALT, AST, Total. Bili, TP)    Benign essential HTN  Well controlled will check labs after slight elevation in lft 3 months ago   - Comprehensive metabolic panel (BMP + Alb, Alk Phos, ALT, AST, Total. Bili, TP); Future  - CBC with platelets; Future  - Comprehensive metabolic panel (BMP + Alb, Alk Phos, ALT, AST, Total. Bili, TP)  - CBC with platelets    KYLIE (obstructive sleep apnea)  Bring ,machine to the hospital     Primary osteoarthritis of right knee  Reason for surgery               - No identified additional risk factors other than previously addressed    Antiplatelet or Anticoagulation Medication Instructions   - Patient is on no antiplatelet or anticoagulation medications.    Additional Medication Instructions  Take all scheduled medications on the day of surgery  EXCEPT for modifications listed below:   - Diuretics: DO NOT TAKE on the day of surgery.   - Herbal medications and vitamins: DO NOT TAKE 14 days prior to surgery.   - celecoxib (Celebrex): DO NOT TAKE 3 days before surgery. May continue without modification for management of severe pain.     Recommendation  Approval given to proceed with proposed procedure, without further diagnostic evaluation.    Enedelia Gaona is a 78 year old, presenting for the following:  Pre-Op Exam          8/21/2024    10:42 AM   Additional Questions   Roomed by KINDRA Owen related to upcoming procedure:        8/21/2024   Pre-Op Questionnaire   Have you ever had a heart attack or stroke? No   Have you ever had surgery on your heart or blood vessels, such as a stent placement, a coronary artery bypass, or surgery on an artery in your head, neck, heart, or legs? (!) YES veins    Do you have chest pain with activity? No   Do you have a history of heart failure? No   Do you currently have a cold, bronchitis or symptoms of other infection? No   Do you have a cough, shortness of breath, or wheezing? No   Do you or anyone in your family have previous history of blood clots? No   Do you or does anyone in your family have a serious bleeding problem such as prolonged bleeding following surgeries or cuts? No   Have you ever had problems with anemia or been told to take iron pills? No   Have you had any abnormal blood loss such as black, tarry or bloody stools, or abnormal vaginal bleeding? No   Have you ever had a blood transfusion? No   Are you willing to have a blood transfusion if it is medically needed before, during, or after your surgery? Yes   Have you or any of your relatives ever had problems with anesthesia? No   Do you have sleep apnea, excessive snoring or daytime drowsiness? (!) YES   Do you have a CPAP machine? Yes bring along   Do you have any artifical heart valves or other implanted medical devices like a pacemaker,  defibrillator, or continuous glucose monitor? No   Do you have artificial joints? No   Are you allergic to latex? No        Health Care Directive  Patient does not have a Health Care Directive or Living Will: Discussed advance care planning with patient; information given to patient to review.    Preoperative Review of    reviewed - no record of controlled substances prescribed.      Status of Chronic Conditions:  See problem list for active medical problems.  Problems all longstanding and stable, except as noted/documented.  See ROS for pertinent symptoms related to these conditions.    Patient Active Problem List    Diagnosis Date Noted    Peripheral polyneuropathy 11/16/2021     Priority: Medium    Prediabetes 11/16/2021     Priority: Medium    Atypical chest pain 08/27/2021     Priority: Medium    Gastroesophageal reflux disease with esophagitis without hemorrhage 08/27/2021     Priority: Medium    Varicose veins of both lower extremities with pain 07/06/2018     Priority: Medium    Recurrent major depression in remission (H24) 01/08/2015     Priority: Medium    KYLIE (obstructive sleep apnea) 07/22/2009     Priority: Medium     Overview:   With CPAP 7/22/2009  [facility]         Impaired fasting glucose 12/01/2005     Priority: Medium    Family history of malignant neoplasm of gastrointestinal tract 12/29/2004     Priority: Medium    Essential hypertension 06/23/2004     Priority: Medium     Overview:   Epic         Hyperlipidemia 06/23/2004     Priority: Medium    Adiposity 04/16/2004     Priority: Medium     Overview:   Epic           Past Medical History:   Diagnosis Date    Depression     Hyperlipidemia     Hypertension      Past Surgical History:   Procedure Laterality Date    ECTOPIC PREGNANCY SURGERY      HYSTERECTOMY       Current Outpatient Medications   Medication Sig Dispense Refill    celecoxib (CELEBREX) 100 MG capsule TAKE 1 CAPSULE TWICE A  capsule 2    famotidine (PEPCID) 20 MG tablet Take  "20 mg by mouth daily      FLUoxetine (PROZAC) 20 MG capsule TAKE 1 CAPSULE DAILY 90 capsule 2    lisinopril (ZESTRIL) 10 MG tablet TAKE 1 TABLET DAILY 90 tablet 2    nortriptyline (PAMELOR) 10 MG capsule TAKE 1 CAPSULE AT BEDTIME 90 capsule 0    nystatin (MYCOSTATIN) 143974 UNIT/GM external cream Apply topically 2 times daily To affected area as needed 30 g 1    polyethylene glycol-propylene glycol (SYSTANE ULTRA) 0.4-0.3 % SOLN ophthalmic solution Place 1 drop into both eyes every hour as needed for dry eyes      simvastatin (ZOCOR) 10 MG tablet TAKE 1 TABLET AT BEDTIME 90 tablet 2    triamcinolone (KENALOG) 0.025 % external ointment 1 APPLICATION TWICE A DAY AS NEEDED TOPICALLY APPLY TO AFFECTED areas 15 g 1    triamterene-HCTZ (MAXZIDE) 75-50 MG tablet TAKE 1 TABLET DAILY 90 tablet 2    vitamin D3 (VITAMIN D3) 50 mcg (2000 units) tablet Take 4,000 Units by mouth daily          Allergies   Allergen Reactions    Haemophilus Influenzae      Other reaction(s): *Unknown    Influenza Vaccine Tri-Sp 09-10 [Influenza Vaccines] Unknown     Leg weakness for months, ? GBS    Morphine Nausea     Tolerate vicodin and codeine    Methocarbamol Rash        Social History     Tobacco Use    Smoking status: Former     Types: Cigarettes    Smokeless tobacco: Former   Substance Use Topics    Alcohol use: Yes     Alcohol/week: 1.0 standard drink of alcohol       History   Drug Use No             Review of Systems  Constitutional, HEENT, cardiovascular, pulmonary, gi and gu systems are negative, except as otherwise noted.    Objective    /68 (BP Location: Right arm, Patient Position: Sitting, Cuff Size: Adult Large)   Pulse 85   Temp 99  F (37.2  C) (Tympanic)   Ht 1.632 m (5' 4.25\")   Wt 88 kg (194 lb)   LMP  (LMP Unknown)   SpO2 98%   BMI 33.04 kg/m     Estimated body mass index is 33.04 kg/m  as calculated from the following:    Height as of this encounter: 1.632 m (5' 4.25\").    Weight as of this encounter: 88 kg (194 " lb).  Physical Exam  GENERAL: alert and no distress  HENT: ear canals and TM's normal, nose and mouth without ulcers or lesions  NECK: no adenopathy, no asymmetry, masses, or scars  RESP: lungs clear to auscultation - no rales, rhonchi or wheezes  CV: regular rate and rhythm, normal S1 S2, no S3 or S4, no murmur, click or rub, no peripheral edema  MS: no gross musculoskeletal defects noted, no edema  NEURO: Normal strength and tone, mentation intact and speech normal    Recent Labs   Lab Test 05/22/24  1227 11/29/23  1050   HGB 15.5  --      --     135   POTASSIUM 4.5 4.1   CR 0.72 0.78   A1C 6.1* 6.2*        Diagnostics  Labs pending at this time.  Results will be reviewed when available.   No EKG this visit, completed in the last 90 days.    Revised Cardiac Risk Index (RCRI)  The patient has the following serious cardiovascular risks for perioperative complications:   - No serious cardiac risks = 0 points     RCRI Interpretation: 0 points: Class I (very low risk - 0.4% complication rate)         Signed Electronically by: Mari Greene MD  A copy of this evaluation report is provided to the requesting physician.

## 2024-08-21 NOTE — H&P (VIEW-ONLY)
Preoperative Evaluation  [facility]  [address] [place] [address]  Phone: [phone]  Primary Provider: Mari Greene MD  Pre-op Performing Provider: Mari Greene MD  Aug 21, 2024               8/21/2024   Surgical Information   What procedure is being done? RIGHT TOTAL KNEE ARTHROPLASTY    Facility or Hospital where procedure/surgery will be performed: [facility]    Who is doing the procedure / surgery? Dr. Dmitri Rodriguez   Date of surgery / procedure: September 12th   Time of surgery / procedure: 7:20 am    Where do you plan to recover after surgery? at home with family        Fax number for surgical facility: Note does not need to be faxed, will be available electronically in Epic.    Assessment & Plan     The proposed surgical procedure is considered INTERMEDIATE risk.    Preop general physical exam      Mixed hyperlipidemia  Check today   - Lipid panel reflex to direct LDL Non-fasting; Future  - Comprehensive metabolic panel (BMP + Alb, Alk Phos, ALT, AST, Total. Bili, TP); Future  - Lipid panel reflex to direct LDL Non-fasting  - Comprehensive metabolic panel (BMP + Alb, Alk Phos, ALT, AST, Total. Bili, TP)    Benign essential HTN  Well controlled will check labs after slight elevation in lft 3 months ago   - Comprehensive metabolic panel (BMP + Alb, Alk Phos, ALT, AST, Total. Bili, TP); Future  - CBC with platelets; Future  - Comprehensive metabolic panel (BMP + Alb, Alk Phos, ALT, AST, Total. Bili, TP)  - CBC with platelets    KYLIE (obstructive sleep apnea)  Bring ,machine to the hospital     Primary osteoarthritis of right knee  Reason for surgery               - No identified additional risk factors other than previously addressed    Antiplatelet or Anticoagulation Medication Instructions   - Patient is on no antiplatelet or anticoagulation medications.    Additional Medication Instructions  Take all scheduled medications on the day of surgery  EXCEPT for modifications listed below:   - Diuretics: DO NOT TAKE on the day of surgery.   - Herbal medications and vitamins: DO NOT TAKE 14 days prior to surgery.   - celecoxib (Celebrex): DO NOT TAKE 3 days before surgery. May continue without modification for management of severe pain.     Recommendation  Approval given to proceed with proposed procedure, without further diagnostic evaluation.    Enedelia Gaona is a 78 year old, presenting for the following:  Pre-Op Exam          8/21/2024    10:42 AM   Additional Questions   Roomed by KINDRA Owen related to upcoming procedure:        8/21/2024   Pre-Op Questionnaire   Have you ever had a heart attack or stroke? No   Have you ever had surgery on your heart or blood vessels, such as a stent placement, a coronary artery bypass, or surgery on an artery in your head, neck, heart, or legs? (!) YES veins    Do you have chest pain with activity? No   Do you have a history of heart failure? No   Do you currently have a cold, bronchitis or symptoms of other infection? No   Do you have a cough, shortness of breath, or wheezing? No   Do you or anyone in your family have previous history of blood clots? No   Do you or does anyone in your family have a serious bleeding problem such as prolonged bleeding following surgeries or cuts? No   Have you ever had problems with anemia or been told to take iron pills? No   Have you had any abnormal blood loss such as black, tarry or bloody stools, or abnormal vaginal bleeding? No   Have you ever had a blood transfusion? No   Are you willing to have a blood transfusion if it is medically needed before, during, or after your surgery? Yes   Have you or any of your relatives ever had problems with anesthesia? No   Do you have sleep apnea, excessive snoring or daytime drowsiness? (!) YES   Do you have a CPAP machine? Yes bring along   Do you have any artifical heart valves or other implanted medical devices like a pacemaker,  defibrillator, or continuous glucose monitor? No   Do you have artificial joints? No   Are you allergic to latex? No        Health Care Directive  Patient does not have a Health Care Directive or Living Will: Discussed advance care planning with patient; information given to patient to review.    Preoperative Review of    reviewed - no record of controlled substances prescribed.      Status of Chronic Conditions:  See problem list for active medical problems.  Problems all longstanding and stable, except as noted/documented.  See ROS for pertinent symptoms related to these conditions.    Patient Active Problem List    Diagnosis Date Noted    Peripheral polyneuropathy 11/16/2021     Priority: Medium    Prediabetes 11/16/2021     Priority: Medium    Atypical chest pain 08/27/2021     Priority: Medium    Gastroesophageal reflux disease with esophagitis without hemorrhage 08/27/2021     Priority: Medium    Varicose veins of both lower extremities with pain 07/06/2018     Priority: Medium    Recurrent major depression in remission (H24) 01/08/2015     Priority: Medium    KYLIE (obstructive sleep apnea) 07/22/2009     Priority: Medium     Overview:   With CPAP 7/22/2009  [facility]         Impaired fasting glucose 12/01/2005     Priority: Medium    Family history of malignant neoplasm of gastrointestinal tract 12/29/2004     Priority: Medium    Essential hypertension 06/23/2004     Priority: Medium     Overview:   Epic         Hyperlipidemia 06/23/2004     Priority: Medium    Adiposity 04/16/2004     Priority: Medium     Overview:   Epic           Past Medical History:   Diagnosis Date    Depression     Hyperlipidemia     Hypertension      Past Surgical History:   Procedure Laterality Date    ECTOPIC PREGNANCY SURGERY      HYSTERECTOMY       Current Outpatient Medications   Medication Sig Dispense Refill    celecoxib (CELEBREX) 100 MG capsule TAKE 1 CAPSULE TWICE A  capsule 2    famotidine (PEPCID) 20 MG tablet Take  "20 mg by mouth daily      FLUoxetine (PROZAC) 20 MG capsule TAKE 1 CAPSULE DAILY 90 capsule 2    lisinopril (ZESTRIL) 10 MG tablet TAKE 1 TABLET DAILY 90 tablet 2    nortriptyline (PAMELOR) 10 MG capsule TAKE 1 CAPSULE AT BEDTIME 90 capsule 0    nystatin (MYCOSTATIN) 676971 UNIT/GM external cream Apply topically 2 times daily To affected area as needed 30 g 1    polyethylene glycol-propylene glycol (SYSTANE ULTRA) 0.4-0.3 % SOLN ophthalmic solution Place 1 drop into both eyes every hour as needed for dry eyes      simvastatin (ZOCOR) 10 MG tablet TAKE 1 TABLET AT BEDTIME 90 tablet 2    triamcinolone (KENALOG) 0.025 % external ointment 1 APPLICATION TWICE A DAY AS NEEDED TOPICALLY APPLY TO AFFECTED areas 15 g 1    triamterene-HCTZ (MAXZIDE) 75-50 MG tablet TAKE 1 TABLET DAILY 90 tablet 2    vitamin D3 (VITAMIN D3) 50 mcg (2000 units) tablet Take 4,000 Units by mouth daily          Allergies   Allergen Reactions    Haemophilus Influenzae      Other reaction(s): *Unknown    Influenza Vaccine Tri-Sp 09-10 [Influenza Vaccines] Unknown     Leg weakness for months, ? GBS    Morphine Nausea     Tolerate vicodin and codeine    Methocarbamol Rash        Social History     Tobacco Use    Smoking status: Former     Types: Cigarettes    Smokeless tobacco: Former   Substance Use Topics    Alcohol use: Yes     Alcohol/week: 1.0 standard drink of alcohol       History   Drug Use No             Review of Systems  Constitutional, HEENT, cardiovascular, pulmonary, gi and gu systems are negative, except as otherwise noted.    Objective    /68 (BP Location: Right arm, Patient Position: Sitting, Cuff Size: Adult Large)   Pulse 85   Temp 99  F (37.2  C) (Tympanic)   Ht 1.632 m (5' 4.25\")   Wt 88 kg (194 lb)   LMP  (LMP Unknown)   SpO2 98%   BMI 33.04 kg/m     Estimated body mass index is 33.04 kg/m  as calculated from the following:    Height as of this encounter: 1.632 m (5' 4.25\").    Weight as of this encounter: 88 kg (194 " lb).  Physical Exam  GENERAL: alert and no distress  HENT: ear canals and TM's normal, nose and mouth without ulcers or lesions  NECK: no adenopathy, no asymmetry, masses, or scars  RESP: lungs clear to auscultation - no rales, rhonchi or wheezes  CV: regular rate and rhythm, normal S1 S2, no S3 or S4, no murmur, click or rub, no peripheral edema  MS: no gross musculoskeletal defects noted, no edema  NEURO: Normal strength and tone, mentation intact and speech normal    Recent Labs   Lab Test 05/22/24  1227 11/29/23  1050   HGB 15.5  --      --     135   POTASSIUM 4.5 4.1   CR 0.72 0.78   A1C 6.1* 6.2*        Diagnostics  Labs pending at this time.  Results will be reviewed when available.   No EKG this visit, completed in the last 90 days.    Revised Cardiac Risk Index (RCRI)  The patient has the following serious cardiovascular risks for perioperative complications:   - No serious cardiac risks = 0 points     RCRI Interpretation: 0 points: Class I (very low risk - 0.4% complication rate)         Signed Electronically by: Mari Greene MD  A copy of this evaluation report is provided to the requesting physician.

## 2024-09-04 NOTE — PROGRESS NOTES
Planning to discharge home on POD 1 in the morning with her daughter, son, and daughter-in-law helping her.       09/04/24 4982   Discharge Planning   Patient/Family Anticipates Transition to home with family  (outpatient PT arranged at [facility])   Concerns to be Addressed all concerns addressed in this encounter   Living Arrangements   People in Home child(maribel), adult;grandchild(maribel)   Type of Residence Private Residence   Is your private residence a single family home or apartment? Single family home   Number of Stairs, Within Home, Primary greater than 10 stairs   Stair Railings, Within Home, Primary railings safe and in good condition   Once home, are you able to live on one level? Yes  (She can sleep on the main level in a hospital bed, but would prefer to sleep in her bedroom on the lower level)   Which level? Main Level   Bathroom Shower/Tub Walk-in shower   Equipment Currently Used at Home raised toilet seat;shower chair  (Has 2 walkers at home)   Support System   Support Systems Children  (daughter, Teresa Lindsay, will stay with her after surgery, son, Tyler, and, daughter-in-law, Tennille, will help also.)   Do you have someone available to stay with you one or two nights once you are home? Yes   Education   Patient attended total joint pre-op class/received pre-op teaching  email/phone call

## 2024-09-11 ENCOUNTER — ANESTHESIA EVENT (OUTPATIENT)
Dept: SURGERY | Facility: CLINIC | Age: 79
End: 2024-09-11
Payer: COMMERCIAL

## 2024-09-12 ENCOUNTER — HOSPITAL ENCOUNTER (OUTPATIENT)
Facility: CLINIC | Age: 79
Discharge: HOME | End: 2024-09-13
Attending: ORTHOPAEDIC SURGERY | Admitting: ORTHOPAEDIC SURGERY
Payer: COMMERCIAL

## 2024-09-12 ENCOUNTER — APPOINTMENT (OUTPATIENT)
Dept: RADIOLOGY | Facility: CLINIC | Age: 79
End: 2024-09-12
Attending: STUDENT IN AN ORGANIZED HEALTH CARE EDUCATION/TRAINING PROGRAM
Payer: COMMERCIAL

## 2024-09-12 ENCOUNTER — APPOINTMENT (OUTPATIENT)
Dept: PHYSICAL THERAPY | Facility: CLINIC | Age: 79
End: 2024-09-12
Attending: ORTHOPAEDIC SURGERY
Payer: COMMERCIAL

## 2024-09-12 ENCOUNTER — ANESTHESIA (OUTPATIENT)
Dept: SURGERY | Facility: CLINIC | Age: 79
End: 2024-09-12
Payer: COMMERCIAL

## 2024-09-12 DIAGNOSIS — M17.11 PRIMARY OSTEOARTHRITIS OF RIGHT KNEE: Primary | ICD-10-CM

## 2024-09-12 LAB — GLUCOSE BLDC GLUCOMTR-MCNC: 193 MG/DL (ref 70–99)

## 2024-09-12 PROCEDURE — 97161 PT EVAL LOW COMPLEX 20 MIN: CPT | Mod: GP

## 2024-09-12 PROCEDURE — 250N000013 HC RX MED GY IP 250 OP 250 PS 637: Performed by: EMERGENCY MEDICINE

## 2024-09-12 PROCEDURE — 82962 GLUCOSE BLOOD TEST: CPT

## 2024-09-12 PROCEDURE — 370N000017 HC ANESTHESIA TECHNICAL FEE, PER MIN: Performed by: ORTHOPAEDIC SURGERY

## 2024-09-12 PROCEDURE — 710N000010 HC RECOVERY PHASE 1, LEVEL 2, PER MIN: Performed by: ORTHOPAEDIC SURGERY

## 2024-09-12 PROCEDURE — 97116 GAIT TRAINING THERAPY: CPT | Mod: GP

## 2024-09-12 PROCEDURE — 258N000003 HC RX IP 258 OP 636: Performed by: ORTHOPAEDIC SURGERY

## 2024-09-12 PROCEDURE — 258N000003 HC RX IP 258 OP 636: Performed by: NURSE ANESTHETIST, CERTIFIED REGISTERED

## 2024-09-12 PROCEDURE — 258N000001 HC RX 258: Performed by: ORTHOPAEDIC SURGERY

## 2024-09-12 PROCEDURE — 250N000011 HC RX IP 250 OP 636: Performed by: NURSE ANESTHETIST, CERTIFIED REGISTERED

## 2024-09-12 PROCEDURE — 272N000001 HC OR GENERAL SUPPLY STERILE: Performed by: ORTHOPAEDIC SURGERY

## 2024-09-12 PROCEDURE — 97110 THERAPEUTIC EXERCISES: CPT | Mod: GP

## 2024-09-12 PROCEDURE — 258N000003 HC RX IP 258 OP 636: Performed by: ANESTHESIOLOGY

## 2024-09-12 PROCEDURE — 250N000011 HC RX IP 250 OP 636: Performed by: ANESTHESIOLOGY

## 2024-09-12 PROCEDURE — 250N000009 HC RX 250: Performed by: PHYSICIAN ASSISTANT

## 2024-09-12 PROCEDURE — 250N000011 HC RX IP 250 OP 636: Performed by: PHYSICIAN ASSISTANT

## 2024-09-12 PROCEDURE — C1713 ANCHOR/SCREW BN/BN,TIS/BN: HCPCS | Performed by: ORTHOPAEDIC SURGERY

## 2024-09-12 PROCEDURE — 250N000013 HC RX MED GY IP 250 OP 250 PS 637: Performed by: PHYSICIAN ASSISTANT

## 2024-09-12 PROCEDURE — 250N000011 HC RX IP 250 OP 636: Performed by: ORTHOPAEDIC SURGERY

## 2024-09-12 PROCEDURE — 250N000009 HC RX 250: Performed by: NURSE ANESTHETIST, CERTIFIED REGISTERED

## 2024-09-12 PROCEDURE — 99204 OFFICE O/P NEW MOD 45 MIN: CPT | Performed by: EMERGENCY MEDICINE

## 2024-09-12 PROCEDURE — 73560 X-RAY EXAM OF KNEE 1 OR 2: CPT | Mod: RT

## 2024-09-12 PROCEDURE — 250N000013 HC RX MED GY IP 250 OP 250 PS 637: Performed by: ORTHOPAEDIC SURGERY

## 2024-09-12 PROCEDURE — 360N000077 HC SURGERY LEVEL 4, PER MIN: Performed by: ORTHOPAEDIC SURGERY

## 2024-09-12 PROCEDURE — 94660 CPAP INITIATION&MGMT: CPT

## 2024-09-12 PROCEDURE — 999N000141 HC STATISTIC PRE-PROCEDURE NURSING ASSESSMENT: Performed by: ORTHOPAEDIC SURGERY

## 2024-09-12 PROCEDURE — 999N000157 HC STATISTIC RCP TIME EA 10 MIN

## 2024-09-12 PROCEDURE — C1776 JOINT DEVICE (IMPLANTABLE): HCPCS | Performed by: ORTHOPAEDIC SURGERY

## 2024-09-12 DEVICE — IMP COMP PATELLA SNR GENESIS II 9X32MM 71420576: Type: IMPLANTABLE DEVICE | Site: KNEE | Status: FUNCTIONAL

## 2024-09-12 DEVICE — LEGION CRUCIATE RETAINING NON                                    POROUS NARROW FEMORAL SIZE 5 RIGHT
Type: IMPLANTABLE DEVICE | Site: KNEE | Status: FUNCTIONAL
Brand: LEGION

## 2024-09-12 DEVICE — SIMPLEX® HV IS A FAST-SETTING ACRYLIC RESIN FOR USE IN BONE SURGERY. MIXING THE TWO SEPARATE STERILE COMPONENTS PRODUCES A DUCTILE BONE CEMENT WHICH, AFTER HARDENING, FIXES THE IMPLANT AND TRANSFERS STRESSES PRODUCED DURING MOVEMENT EVENLY TO THE BONE. SIMPLEX® HV CEMENT POWDER ALSO CONTAINS INSOLUBLE ZIRCONIUM DIOXIDE AS AN X-RAY CONTRAST MEDIUM. SIMPLEX® HV DOES NOT EMIT A SIGNAL AND DOES NOT POSE A SAFETY RISK IN A MAGNETIC RESONANCE ENVIRONMENT.
Type: IMPLANTABLE DEVICE | Site: KNEE | Status: FUNCTIONAL
Brand: SIMPLEX HV

## 2024-09-12 DEVICE — GENESIS II NON-POROUS TIBIAL                                    BASEPLATE SIZE 3 RIGHT
Type: IMPLANTABLE DEVICE | Site: KNEE | Status: FUNCTIONAL
Brand: GENESIS II

## 2024-09-12 DEVICE — LEGION CRUCIATE RETAINING HIGH                                    FLEX HIGHLY CROSS LINKED                                    POLYETHYLENE SIZE 3-4 13MM
Type: IMPLANTABLE DEVICE | Site: KNEE | Status: FUNCTIONAL
Brand: LEGION

## 2024-09-12 RX ORDER — ONDANSETRON 2 MG/ML
INJECTION INTRAMUSCULAR; INTRAVENOUS PRN
Status: DISCONTINUED | OUTPATIENT
Start: 2024-09-12 | End: 2024-09-12

## 2024-09-12 RX ORDER — BUPIVACAINE HYDROCHLORIDE 7.5 MG/ML
INJECTION, SOLUTION INTRASPINAL
Status: COMPLETED | OUTPATIENT
Start: 2024-09-12 | End: 2024-09-12

## 2024-09-12 RX ORDER — HYDROXYZINE HYDROCHLORIDE 10 MG/1
10 TABLET, FILM COATED ORAL EVERY 6 HOURS PRN
Status: DISCONTINUED | OUTPATIENT
Start: 2024-09-12 | End: 2024-09-13 | Stop reason: HOSPADM

## 2024-09-12 RX ORDER — DEXAMETHASONE SODIUM PHOSPHATE 10 MG/ML
INJECTION, SOLUTION INTRAMUSCULAR; INTRAVENOUS PRN
Status: DISCONTINUED | OUTPATIENT
Start: 2024-09-12 | End: 2024-09-12

## 2024-09-12 RX ORDER — PROCHLORPERAZINE MALEATE 5 MG
5 TABLET ORAL EVERY 6 HOURS PRN
Status: DISCONTINUED | OUTPATIENT
Start: 2024-09-12 | End: 2024-09-13 | Stop reason: HOSPADM

## 2024-09-12 RX ORDER — NORTRIPTYLINE HCL 10 MG
10 CAPSULE ORAL AT BEDTIME
Status: DISCONTINUED | OUTPATIENT
Start: 2024-09-12 | End: 2024-09-13 | Stop reason: HOSPADM

## 2024-09-12 RX ORDER — SODIUM CHLORIDE, SODIUM LACTATE, POTASSIUM CHLORIDE, CALCIUM CHLORIDE 600; 310; 30; 20 MG/100ML; MG/100ML; MG/100ML; MG/100ML
INJECTION, SOLUTION INTRAVENOUS CONTINUOUS
Status: DISCONTINUED | OUTPATIENT
Start: 2024-09-12 | End: 2024-09-12 | Stop reason: HOSPADM

## 2024-09-12 RX ORDER — ASPIRIN 81 MG/1
81 TABLET ORAL 2 TIMES DAILY
Qty: 80 TABLET | Refills: 0 | Status: SHIPPED | OUTPATIENT
Start: 2024-09-12 | End: 2024-10-22

## 2024-09-12 RX ORDER — LIDOCAINE 40 MG/G
CREAM TOPICAL
Status: DISCONTINUED | OUTPATIENT
Start: 2024-09-12 | End: 2024-09-13 | Stop reason: HOSPADM

## 2024-09-12 RX ORDER — BUPIVACAINE HYDROCHLORIDE 5 MG/ML
INJECTION, SOLUTION EPIDURAL; INTRACAUDAL
Status: COMPLETED | OUTPATIENT
Start: 2024-09-12 | End: 2024-09-12

## 2024-09-12 RX ORDER — CEFAZOLIN SODIUM/WATER 2 G/20 ML
2 SYRINGE (ML) INTRAVENOUS
Status: COMPLETED | OUTPATIENT
Start: 2024-09-12 | End: 2024-09-12

## 2024-09-12 RX ORDER — OXYCODONE HYDROCHLORIDE 5 MG/1
10 TABLET ORAL EVERY 4 HOURS PRN
Status: DISCONTINUED | OUTPATIENT
Start: 2024-09-12 | End: 2024-09-13 | Stop reason: HOSPADM

## 2024-09-12 RX ORDER — OXYCODONE HYDROCHLORIDE 5 MG/1
10 TABLET ORAL
Status: DISCONTINUED | OUTPATIENT
Start: 2024-09-12 | End: 2024-09-12 | Stop reason: HOSPADM

## 2024-09-12 RX ORDER — OXYCODONE HYDROCHLORIDE 5 MG/1
5 TABLET ORAL
Status: DISCONTINUED | OUTPATIENT
Start: 2024-09-12 | End: 2024-09-12 | Stop reason: HOSPADM

## 2024-09-12 RX ORDER — VITAMIN B COMPLEX
100 TABLET ORAL DAILY
Status: DISCONTINUED | OUTPATIENT
Start: 2024-09-13 | End: 2024-09-13 | Stop reason: HOSPADM

## 2024-09-12 RX ORDER — FENTANYL CITRATE 50 UG/ML
25 INJECTION, SOLUTION INTRAMUSCULAR; INTRAVENOUS EVERY 5 MIN PRN
Status: DISCONTINUED | OUTPATIENT
Start: 2024-09-12 | End: 2024-09-12 | Stop reason: HOSPADM

## 2024-09-12 RX ORDER — POLYETHYLENE GLYCOL 3350 17 G/17G
17 POWDER, FOR SOLUTION ORAL DAILY
Status: DISCONTINUED | OUTPATIENT
Start: 2024-09-13 | End: 2024-09-13 | Stop reason: HOSPADM

## 2024-09-12 RX ORDER — SIMVASTATIN 10 MG
10 TABLET ORAL AT BEDTIME
Status: DISCONTINUED | OUTPATIENT
Start: 2024-09-12 | End: 2024-09-13 | Stop reason: HOSPADM

## 2024-09-12 RX ORDER — ACETAMINOPHEN 325 MG/1
650 TABLET ORAL EVERY 4 HOURS PRN
Status: DISCONTINUED | OUTPATIENT
Start: 2024-09-15 | End: 2024-09-13 | Stop reason: HOSPADM

## 2024-09-12 RX ORDER — BISACODYL 10 MG
10 SUPPOSITORY, RECTAL RECTAL DAILY PRN
Status: DISCONTINUED | OUTPATIENT
Start: 2024-09-12 | End: 2024-09-13 | Stop reason: HOSPADM

## 2024-09-12 RX ORDER — PROPOFOL 10 MG/ML
INJECTION, EMULSION INTRAVENOUS CONTINUOUS PRN
Status: DISCONTINUED | OUTPATIENT
Start: 2024-09-12 | End: 2024-09-12

## 2024-09-12 RX ORDER — ONDANSETRON 4 MG/1
4 TABLET, ORALLY DISINTEGRATING ORAL EVERY 30 MIN PRN
Status: DISCONTINUED | OUTPATIENT
Start: 2024-09-12 | End: 2024-09-12 | Stop reason: HOSPADM

## 2024-09-12 RX ORDER — LISINOPRIL 10 MG/1
10 TABLET ORAL DAILY
Status: DISCONTINUED | OUTPATIENT
Start: 2024-09-13 | End: 2024-09-13 | Stop reason: HOSPADM

## 2024-09-12 RX ORDER — CALCIUM CARBONATE 500 MG/1
500 TABLET, CHEWABLE ORAL 4 TIMES DAILY PRN
Status: DISCONTINUED | OUTPATIENT
Start: 2024-09-12 | End: 2024-09-13 | Stop reason: HOSPADM

## 2024-09-12 RX ORDER — HYDROMORPHONE HCL IN WATER/PF 6 MG/30 ML
0.4 PATIENT CONTROLLED ANALGESIA SYRINGE INTRAVENOUS EVERY 5 MIN PRN
Status: DISCONTINUED | OUTPATIENT
Start: 2024-09-12 | End: 2024-09-12 | Stop reason: HOSPADM

## 2024-09-12 RX ORDER — KETOROLAC TROMETHAMINE 15 MG/ML
15 INJECTION, SOLUTION INTRAMUSCULAR; INTRAVENOUS EVERY 6 HOURS
Status: COMPLETED | OUTPATIENT
Start: 2024-09-12 | End: 2024-09-13

## 2024-09-12 RX ORDER — HYDROMORPHONE HCL IN WATER/PF 6 MG/30 ML
0.2 PATIENT CONTROLLED ANALGESIA SYRINGE INTRAVENOUS
Status: DISCONTINUED | OUTPATIENT
Start: 2024-09-12 | End: 2024-09-13 | Stop reason: HOSPADM

## 2024-09-12 RX ORDER — SODIUM CHLORIDE, SODIUM LACTATE, POTASSIUM CHLORIDE, CALCIUM CHLORIDE 600; 310; 30; 20 MG/100ML; MG/100ML; MG/100ML; MG/100ML
INJECTION, SOLUTION INTRAVENOUS CONTINUOUS
Status: DISCONTINUED | OUTPATIENT
Start: 2024-09-12 | End: 2024-09-13 | Stop reason: HOSPADM

## 2024-09-12 RX ORDER — LIDOCAINE 40 MG/G
CREAM TOPICAL
Status: DISCONTINUED | OUTPATIENT
Start: 2024-09-12 | End: 2024-09-12 | Stop reason: HOSPADM

## 2024-09-12 RX ORDER — ASPIRIN 81 MG/1
81 TABLET ORAL 2 TIMES DAILY
Status: DISCONTINUED | OUTPATIENT
Start: 2024-09-12 | End: 2024-09-13 | Stop reason: HOSPADM

## 2024-09-12 RX ORDER — ONDANSETRON 4 MG/1
4 TABLET, ORALLY DISINTEGRATING ORAL EVERY 6 HOURS PRN
Status: DISCONTINUED | OUTPATIENT
Start: 2024-09-12 | End: 2024-09-13 | Stop reason: HOSPADM

## 2024-09-12 RX ORDER — ACETAMINOPHEN 325 MG/1
975 TABLET ORAL EVERY 8 HOURS
Status: DISCONTINUED | OUTPATIENT
Start: 2024-09-12 | End: 2024-09-13 | Stop reason: HOSPADM

## 2024-09-12 RX ORDER — DIPHENHYDRAMINE HCL 12.5 MG/5ML
12.5 SOLUTION ORAL EVERY 6 HOURS PRN
Status: DISCONTINUED | OUTPATIENT
Start: 2024-09-12 | End: 2024-09-13 | Stop reason: HOSPADM

## 2024-09-12 RX ORDER — FENTANYL CITRATE 50 UG/ML
25-100 INJECTION, SOLUTION INTRAMUSCULAR; INTRAVENOUS
Status: DISCONTINUED | OUTPATIENT
Start: 2024-09-12 | End: 2024-09-12 | Stop reason: HOSPADM

## 2024-09-12 RX ORDER — BENZONATATE 100 MG/1
100 CAPSULE ORAL 3 TIMES DAILY PRN
Status: DISCONTINUED | OUTPATIENT
Start: 2024-09-12 | End: 2024-09-13 | Stop reason: HOSPADM

## 2024-09-12 RX ORDER — OXYCODONE HYDROCHLORIDE 5 MG/1
5 TABLET ORAL EVERY 4 HOURS PRN
Status: DISCONTINUED | OUTPATIENT
Start: 2024-09-12 | End: 2024-09-13 | Stop reason: HOSPADM

## 2024-09-12 RX ORDER — TRANEXAMIC ACID 650 MG/1
1950 TABLET ORAL ONCE
Status: COMPLETED | OUTPATIENT
Start: 2024-09-12 | End: 2024-09-12

## 2024-09-12 RX ORDER — ONDANSETRON 2 MG/ML
4 INJECTION INTRAMUSCULAR; INTRAVENOUS EVERY 6 HOURS PRN
Status: DISCONTINUED | OUTPATIENT
Start: 2024-09-12 | End: 2024-09-13 | Stop reason: HOSPADM

## 2024-09-12 RX ORDER — FAMOTIDINE 20 MG/1
20 TABLET, FILM COATED ORAL DAILY
Status: DISCONTINUED | OUTPATIENT
Start: 2024-09-12 | End: 2024-09-13 | Stop reason: HOSPADM

## 2024-09-12 RX ORDER — ONDANSETRON 2 MG/ML
4 INJECTION INTRAMUSCULAR; INTRAVENOUS EVERY 30 MIN PRN
Status: DISCONTINUED | OUTPATIENT
Start: 2024-09-12 | End: 2024-09-12 | Stop reason: HOSPADM

## 2024-09-12 RX ORDER — NALOXONE HYDROCHLORIDE 0.4 MG/ML
0.1 INJECTION, SOLUTION INTRAMUSCULAR; INTRAVENOUS; SUBCUTANEOUS
Status: DISCONTINUED | OUTPATIENT
Start: 2024-09-12 | End: 2024-09-12 | Stop reason: HOSPADM

## 2024-09-12 RX ORDER — TRIAMTERENE AND HYDROCHLOROTHIAZIDE 75; 50 MG/1; MG/1
1 TABLET ORAL DAILY
Status: DISCONTINUED | OUTPATIENT
Start: 2024-09-13 | End: 2024-09-13

## 2024-09-12 RX ORDER — HYDROMORPHONE HCL IN WATER/PF 6 MG/30 ML
0.2 PATIENT CONTROLLED ANALGESIA SYRINGE INTRAVENOUS EVERY 5 MIN PRN
Status: DISCONTINUED | OUTPATIENT
Start: 2024-09-12 | End: 2024-09-12 | Stop reason: HOSPADM

## 2024-09-12 RX ORDER — AMOXICILLIN 250 MG
1 CAPSULE ORAL 2 TIMES DAILY PRN
Qty: 42 TABLET | Refills: 0 | Status: SHIPPED | OUTPATIENT
Start: 2024-09-12

## 2024-09-12 RX ORDER — DEXAMETHASONE SODIUM PHOSPHATE 4 MG/ML
4 INJECTION, SOLUTION INTRA-ARTICULAR; INTRALESIONAL; INTRAMUSCULAR; INTRAVENOUS; SOFT TISSUE
Status: DISCONTINUED | OUTPATIENT
Start: 2024-09-12 | End: 2024-09-12 | Stop reason: HOSPADM

## 2024-09-12 RX ORDER — AMOXICILLIN 250 MG
1 CAPSULE ORAL 2 TIMES DAILY
Status: DISCONTINUED | OUTPATIENT
Start: 2024-09-12 | End: 2024-09-13 | Stop reason: HOSPADM

## 2024-09-12 RX ORDER — FENTANYL CITRATE 50 UG/ML
50 INJECTION, SOLUTION INTRAMUSCULAR; INTRAVENOUS EVERY 5 MIN PRN
Status: DISCONTINUED | OUTPATIENT
Start: 2024-09-12 | End: 2024-09-12 | Stop reason: HOSPADM

## 2024-09-12 RX ORDER — GLYCOPYRROLATE 0.2 MG/ML
INJECTION, SOLUTION INTRAMUSCULAR; INTRAVENOUS PRN
Status: DISCONTINUED | OUTPATIENT
Start: 2024-09-12 | End: 2024-09-12

## 2024-09-12 RX ORDER — HYDROMORPHONE HCL IN WATER/PF 6 MG/30 ML
0.4 PATIENT CONTROLLED ANALGESIA SYRINGE INTRAVENOUS
Status: DISCONTINUED | OUTPATIENT
Start: 2024-09-12 | End: 2024-09-13 | Stop reason: HOSPADM

## 2024-09-12 RX ORDER — CEFAZOLIN SODIUM 2 G/100ML
2 INJECTION, SOLUTION INTRAVENOUS EVERY 8 HOURS
Status: COMPLETED | OUTPATIENT
Start: 2024-09-12 | End: 2024-09-12

## 2024-09-12 RX ORDER — CEFAZOLIN SODIUM/WATER 2 G/20 ML
2 SYRINGE (ML) INTRAVENOUS SEE ADMIN INSTRUCTIONS
Status: DISCONTINUED | OUTPATIENT
Start: 2024-09-12 | End: 2024-09-12 | Stop reason: HOSPADM

## 2024-09-12 RX ADMIN — MIDAZOLAM HYDROCHLORIDE 1 MG: 1 INJECTION, SOLUTION INTRAMUSCULAR; INTRAVENOUS at 07:02

## 2024-09-12 RX ADMIN — CEFAZOLIN SODIUM 2 G: 2 INJECTION, SOLUTION INTRAVENOUS at 22:44

## 2024-09-12 RX ADMIN — GLYCOPYRROLATE 0.2 MG: 0.2 INJECTION INTRAMUSCULAR; INTRAVENOUS at 07:30

## 2024-09-12 RX ADMIN — SENNOSIDES AND DOCUSATE SODIUM 1 TABLET: 50; 8.6 TABLET ORAL at 21:19

## 2024-09-12 RX ADMIN — PROPOFOL 100 MCG/KG/MIN: 10 INJECTION, EMULSION INTRAVENOUS at 07:28

## 2024-09-12 RX ADMIN — BUPIVACAINE HYDROCHLORIDE 20 ML: 5 INJECTION, SOLUTION EPIDURAL; INTRACAUDAL; PERINEURAL at 07:05

## 2024-09-12 RX ADMIN — SIMVASTATIN 10 MG: 10 TABLET, FILM COATED ORAL at 21:26

## 2024-09-12 RX ADMIN — ACETAMINOPHEN 975 MG: 325 TABLET ORAL at 12:04

## 2024-09-12 RX ADMIN — SODIUM CHLORIDE, POTASSIUM CHLORIDE, SODIUM LACTATE AND CALCIUM CHLORIDE: 600; 310; 30; 20 INJECTION, SOLUTION INTRAVENOUS at 13:33

## 2024-09-12 RX ADMIN — ONDANSETRON 4 MG: 2 INJECTION INTRAMUSCULAR; INTRAVENOUS at 07:31

## 2024-09-12 RX ADMIN — PHENYLEPHRINE HYDROCHLORIDE 0.2 MCG/KG/MIN: 10 INJECTION INTRAVENOUS at 07:35

## 2024-09-12 RX ADMIN — BUPIVACAINE HYDROCHLORIDE IN DEXTROSE 1.6 ML: 7.5 INJECTION, SOLUTION SUBARACHNOID at 07:28

## 2024-09-12 RX ADMIN — KETOROLAC TROMETHAMINE 15 MG: 15 INJECTION, SOLUTION INTRAMUSCULAR; INTRAVENOUS at 14:58

## 2024-09-12 RX ADMIN — CEFAZOLIN SODIUM 2 G: 2 INJECTION, SOLUTION INTRAVENOUS at 14:59

## 2024-09-12 RX ADMIN — HYDROMORPHONE HYDROCHLORIDE 0.2 MG: 0.2 INJECTION, SOLUTION INTRAMUSCULAR; INTRAVENOUS; SUBCUTANEOUS at 11:46

## 2024-09-12 RX ADMIN — OXYCODONE HYDROCHLORIDE 5 MG: 5 TABLET ORAL at 11:58

## 2024-09-12 RX ADMIN — OXYCODONE HYDROCHLORIDE 5 MG: 5 TABLET ORAL at 21:30

## 2024-09-12 RX ADMIN — ASPIRIN 81 MG: 81 TABLET, COATED ORAL at 21:19

## 2024-09-12 RX ADMIN — OXYCODONE HYDROCHLORIDE 5 MG: 5 TABLET ORAL at 17:19

## 2024-09-12 RX ADMIN — KETOROLAC TROMETHAMINE 15 MG: 15 INJECTION, SOLUTION INTRAMUSCULAR; INTRAVENOUS at 21:19

## 2024-09-12 RX ADMIN — TRANEXAMIC ACID 1950 MG: 650 TABLET ORAL at 06:39

## 2024-09-12 RX ADMIN — ACETAMINOPHEN 975 MG: 325 TABLET ORAL at 17:18

## 2024-09-12 RX ADMIN — NORTRIPTYLINE HYDROCHLORIDE 10 MG: 10 CAPSULE ORAL at 21:25

## 2024-09-12 RX ADMIN — DEXMEDETOMIDINE HYDROCHLORIDE 12 MCG: 100 INJECTION, SOLUTION INTRAVENOUS at 07:30

## 2024-09-12 RX ADMIN — Medication 2 G: at 07:18

## 2024-09-12 RX ADMIN — DEXAMETHASONE SODIUM PHOSPHATE 6 MG: 10 INJECTION, SOLUTION INTRAMUSCULAR; INTRAVENOUS at 07:31

## 2024-09-12 RX ADMIN — FENTANYL CITRATE 50 MCG: 50 INJECTION, SOLUTION INTRAMUSCULAR; INTRAVENOUS at 07:02

## 2024-09-12 RX ADMIN — ASPIRIN 81 MG: 81 TABLET, COATED ORAL at 14:58

## 2024-09-12 RX ADMIN — SODIUM CHLORIDE, POTASSIUM CHLORIDE, SODIUM LACTATE AND CALCIUM CHLORIDE: 600; 310; 30; 20 INJECTION, SOLUTION INTRAVENOUS at 06:39

## 2024-09-12 ASSESSMENT — ACTIVITIES OF DAILY LIVING (ADL)
ADLS_ACUITY_SCORE: 23
ADLS_ACUITY_SCORE: 20
ADLS_ACUITY_SCORE: 20
ADLS_ACUITY_SCORE: 23
ADLS_ACUITY_SCORE: 23
ADLS_ACUITY_SCORE: 20
ADLS_ACUITY_SCORE: 23
ADLS_ACUITY_SCORE: 20
ADLS_ACUITY_SCORE: 23
ADLS_ACUITY_SCORE: 20
ADLS_ACUITY_SCORE: 23
ADLS_ACUITY_SCORE: 20
ADLS_ACUITY_SCORE: 20
ADLS_ACUITY_SCORE: 23

## 2024-09-12 NOTE — ANESTHESIA POSTPROCEDURE EVALUATION
Patient: Candelaria Hewitt    Procedure: Procedure(s):  RIGHT TOTAL KNEE ARTHROPLASTY       Anesthesia Type:  Spinal    Note:     Postop Pain Control: Uneventful            Sign Out: Well controlled pain   PONV: No   Neuro/Psych: Uneventful            Sign Out: Acceptable/Baseline neuro status   Airway/Respiratory: Uneventful            Sign Out: Acceptable/Baseline resp. status   CV/Hemodynamics: Uneventful            Sign Out: Acceptable CV status; No obvious hypovolemia; No obvious fluid overload   Other NRE:    DID A NON-ROUTINE EVENT OCCUR? No           Last vitals:  Vitals Value Taken Time   /59 09/12/24 1200   Temp 36.5  C (97.7  F) 09/12/24 0943   Pulse 85 09/12/24 1212   Resp 24 09/12/24 0943   SpO2 97 % 09/12/24 1212   Vitals shown include unfiled device data.    Electronically Signed By: Christian Valera MD  September 12, 2024  1:44 PM

## 2024-09-12 NOTE — ANESTHESIA PROCEDURE NOTES
"Adductor canal Procedure Note    Pre-Procedure   Staff -        Anesthesiologist:  Christian Valera MD       Performed By: anesthesiologist       Location: pre-op       Procedure Start/Stop Times: 9/12/2024 7:05 AM and 9/12/2024 7:09 AM       Pre-Anesthestic Checklist: patient identified, IV checked, site marked, risks and benefits discussed, informed consent, monitors and equipment checked, pre-op evaluation, at physician/surgeon's request and post-op pain management  Timeout:       Correct Patient: Yes        Correct Procedure: Yes        Correct Site: Yes        Correct Position: Yes        Correct Laterality: Yes        Site Marked: Yes  Procedure Documentation  Procedure: Adductor canal       Laterality: right       Patient Position: supine       Patient Prep/Sterile Barriers: sterile gloves, mask       Skin prep: Chloraprep       Needle Gauge: 20.        Needle Length (Inches): 4        Ultrasound guided       1. Ultrasound was used to identify targeted nerve, plexus, vascular marker, or fascial plane and place a needle adjacent to it in real-time.       2. Ultrasound was used to visualize the spread of anesthetic in close proximity to the above referenced structure.       3. A permanent image is entered into the patient's record.       4. The visualized anatomic structures appeared normal.       5. There were no apparent abnormal pathologic findings.    Assessment/Narrative         The placement was negative for: blood aspirated, painful injection and site bleeding       Bolus given via needle..        Secured via.        Insertion/Infusion Method: Single Shot       Complications: none    Medication(s) Administered   Bupivacaine 0.5% PF (Infiltration) - Infiltration   20 mL - 9/12/2024 7:05:00 AM  Medication Administration Time: 9/12/2024 7:05 AM      FOR [facility] ([facility]) ONLY:   Pain Team Contact information: please page the Pain Team Via [employer]. Search \"Pain\". During daytime hours, please page the " attending first. At night please page the resident first.

## 2024-09-12 NOTE — CONSULTS
[facility] MEDICINE CONSULT NOTE   Physician requesting consult: Sterling Rodriguez MD    Reason for consult: Postoperative medical management of medical co-morbidities as below    Assessment and Plan      78 year old female into [facility] on 9/12/2024 after presenting to   For an elective right TKA, minimally invasive.  Pt had spinal anesthesia with  Minimal blood loss.      [facility] service was asked to evaluate patient for postoperative medical management as follows below. Please resume the home medications as reconciled and further noted below with ordered hold parameters.  Vital signs have been stable post-operatively including hemodynamically stable blood pressure and heart rate. Thank you for this consult; we will continue to follow this patient until discharge.    Problem list:     POD #0 TKA, right:   HTN, essential: resume thiazide tomorrow, lisinopril today  Mood disorder: resume prozac 20 mg daily; pamelor at night;   Dyslipidemia:  continue zocor  KYLIE:  on cpap  Dvt prevention:  baby aspirin twice daily  Disposition:  pending post op clinical performance        -Reviewed the patient's preoperative H and P and updated missing elements.  -Home medication reconciliation has been reviewed.  Medications have been ordered as noted from the home list and changes are documented above       Past Medical History     Patient Active Problem List    Diagnosis Date Noted    Primary osteoarthritis of right knee 09/12/2024     Priority: Medium    Peripheral polyneuropathy 11/16/2021     Priority: Medium    Prediabetes 11/16/2021     Priority: Medium    Atypical chest pain 08/27/2021     Priority: Medium    Gastroesophageal reflux disease with esophagitis without hemorrhage 08/27/2021     Priority: Medium    Varicose veins of both lower extremities with pain 07/06/2018     Priority: Medium    Recurrent major depression in remission (H24) 01/08/2015     Priority: Medium    KYLIE (obstructive sleep  apnea) 07/22/2009     Priority: Medium     Overview:   With CPAP 7/22/2009  [facility]         Impaired fasting glucose 12/01/2005     Priority: Medium    Family history of malignant neoplasm of gastrointestinal tract 12/29/2004     Priority: Medium    Essential hypertension 06/23/2004     Priority: Medium     Overview:   Epic         Hyperlipidemia 06/23/2004     Priority: Medium    Adiposity 04/16/2004     Priority: Medium     Overview:   Epic             Surgical History   She  has a past surgical history that includes Ectopic pregnancy surgery; Hysterectomy (1986); Strip vein (Bilateral, 2012); Cataract Extraction (Bilateral, 2022); and Abdomen surgery.     Past Surgical History:   Procedure Laterality Date    ABDOMEN SURGERY      removed polyps from uterus    CATARACT EXTRACTION Bilateral 2022    ECTOPIC PREGNANCY SURGERY      ovary removed    HYSTERECTOMY  1986    STRIP VEIN Bilateral 2012       Family History    Reviewed, and family history includes Alcoholism in her father; Breast Cancer (age of onset: 68.00) in her sister; Colon Cancer in her father; Diabetes in her father; Hypertension in her mother; Kidney Disease in her mother.    Social History    Reviewed, and she  reports that she has quit smoking. Her smoking use included cigarettes. She has quit using smokeless tobacco. She reports current alcohol use of about 1.0 standard drink of alcohol per week. She reports that she does not use drugs.  Social History     Tobacco Use    Smoking status: Former     Types: Cigarettes    Smokeless tobacco: Former   Substance Use Topics    Alcohol use: Yes     Alcohol/week: 1.0 standard drink of alcohol     Types: 1 Standard drinks or equivalent per week     Comment: 2 drinks per months       Allergies     Allergies   Allergen Reactions    Haemophilus Influenzae      Other reaction(s): *Unknown    Influenza Vaccine Tri-Sp 09-10 [Influenza Vaccines] Unknown     Leg weakness for months, ? GBS    Morphine Nausea     Tolerate  vicodin and codeine    Methocarbamol Rash       Prior to Admission Medications      Medications Prior to Admission   Medication Sig Dispense Refill Last Dose    acetaminophen (TYLENOL) 500 MG tablet Take 500-1,000 mg by mouth every 6 hours as needed for mild pain.   prn at prn    albuterol (PROAIR HFA/PROVENTIL HFA/VENTOLIN HFA) 108 (90 Base) MCG/ACT inhaler Inhale 2 puffs into the lungs every 6 hours as needed for shortness of breath, wheezing or cough.   More than a month at prn - not with    benzonatate (TESSALON) 100 MG capsule Take 100 mg by mouth 3 times daily as needed for cough.   prn at prn    calcium carbonate-vitamin D (CALTRATE) 600-10 MG-MCG per tablet Take 1 tablet by mouth 2 times daily.   Past Month at am    celecoxib (CELEBREX) 100 MG capsule TAKE 1 CAPSULE TWICE A DAY (Patient taking differently: TAKE 1 CAPSULE TWICE A DAY  Stopping 9/8/24 before surgery) 180 capsule 2 9/8/2024 at am    conjugated estrogens (PREMARIN) 0.625 MG/GM vaginal cream Place vaginally twice a week.   Past Month at am    famotidine (PEPCID) 20 MG tablet Take 20 mg by mouth daily   9/11/2024 at am    FLUoxetine (PROZAC) 20 MG capsule TAKE 1 CAPSULE DAILY 90 capsule 2 9/11/2024 at am    lisinopril (ZESTRIL) 10 MG tablet TAKE 1 TABLET DAILY 90 tablet 2 9/11/2024 at am    nortriptyline (PAMELOR) 10 MG capsule TAKE 1 CAPSULE AT BEDTIME 90 capsule 0 9/11/2024 at pm    nystatin (MYCOSTATIN) 310179 UNIT/GM external cream Apply topically 2 times daily To affected area as needed 30 g 1 prn at prn    polyethylene glycol-propylene glycol (SYSTANE ULTRA) 0.4-0.3 % SOLN ophthalmic solution Place 1 drop into both eyes every hour as needed for dry eyes   9/10/2024 at not with    simvastatin (ZOCOR) 10 MG tablet TAKE 1 TABLET AT BEDTIME 90 tablet 2 9/11/2024 at pm    triamcinolone (KENALOG) 0.025 % external ointment 1 APPLICATION TWICE A DAY AS NEEDED TOPICALLY APPLY TO AFFECTED areas 15 g 1 prn at prn    triamterene-HCTZ (MAXZIDE) 75-50 MG  tablet TAKE 1 TABLET DAILY 90 tablet 2 9/11/2024 at am    vitamin D3 (VITAMIN D3) 50 mcg (2000 units) tablet Take 4,000 Units by mouth daily    9/5/2024 at am       Review of Systems   A 12 point comprehensive review of systems was negative except as noted above.    OBJECTIVE         Physical Exam   Temp:  [96.8  F (36  C)-98.4  F (36.9  C)] 97.7  F (36.5  C)  Pulse:  [67-79] 75  Resp:  [14-30] 18  BP: (101-123)/(53-68) 103/55  SpO2:  [92 %-97 %] 95 %  Body mass index is 32.61 kg/m .    GENERAL:  Alert, appears comfortable, in no acute distress, appears stated age   HEAD:  Normocephalic, without obvious abnormality, atraumatic   EYES:  PERRL, conjunctiva/corneas clear, no scleral icterus, EOM's intact   NOSE: Nares normal, septum midline, mucosa normal, no drainage   THROAT: Lips, mucosa, and tongue normal; teeth and gums normal, mouth moist   NECK: Supple, symmetrical, trachea midline   BACK:   Symmetric, no curvature, ROM normal   LUNGS:   Clear to auscultation bilaterally, no rales, rhonchi, or wheezing, symmetric chest rise on inhalation, respirations unlabored   CHEST WALL:  No tenderness or deformity   HEART:  Regular rate and rhythm, S1 and S2 normal, no murmur, rub, or gallop    ABDOMEN:   Soft, non-tender, bowel sounds active all four quadrants, no masses, no organomegaly, no rebound or guarding   EXTREMITIES: Extremities normal, atraumatic, no cyanosis or edema    SKIN: Dry to touch, no exanthems in the visualized areas   NEURO: Alert, oriented x 4, moves all four extremities freely/spontaneously   PSYCH: Cooperative, behavior is appropriate             Labs Reviewed Personally By Myself   No results found for this or any previous visit (from the past 24 hour(s)).    Preoperative Labs Reviewed Personally By Myself     Thank you for this consultation.  Appreciate the opportunity to participate in the care of Candelaria Hewitt, please feel free to contact us for any questions or concerns.    Sofie Falcon,  MD  [facility]  [facility]  [facility]  Phone: #[phone]

## 2024-09-12 NOTE — ANESTHESIA CARE TRANSFER NOTE
Patient: Candelaria Hewitt    Procedure: Procedure(s):  RIGHT TOTAL KNEE ARTHROPLASTY       Diagnosis: Osteoarthritis of right knee [M17.11]  Diagnosis Additional Information: No value filed.    Anesthesia Type:   Spinal     Note:    Oropharynx: oropharynx clear of all foreign objects and spontaneously breathing  Level of Consciousness: awake  Oxygen Supplementation: face mask  Level of Supplemental Oxygen (L/min / FiO2): 8  Independent Airway: airway patency satisfactory and stable  Dentition: dentition unchanged  Vital Signs Stable: post-procedure vital signs reviewed and stable  Report to RN Given: handoff report given  Patient transferred to: PACU    Handoff Report: Identifed the Patient, Identified the Reponsible Provider, Reviewed the pertinent medical history, Discussed the surgical course, Reviewed Intra-OP anesthesia mangement and issues during anesthesia, Set expectations for post-procedure period and Allowed opportunity for questions and acknowledgement of understanding      Vitals:  Vitals Value Taken Time   /53    Temp 98.1F  09/12/24 0903   Pulse 80 09/12/24 0903   Resp 17 09/12/24 0903   SpO2 98 % 09/12/24 0903   Vitals shown include unfiled device data.    Electronically Signed By: SONU TONY CRNA  September 12, 2024  9:04 AM

## 2024-09-12 NOTE — PHARMACY-ADMISSION MEDICATION HISTORY
Pharmacist Admission Medication History    Admission medication history is complete. The information provided in this note is only as accurate as the sources available at the time of the update.    Information Source(s): Patient and CareEverywhere/SureScripts via in-person    Pertinent Information:      Changes made to PTA medication list:  Added: None  Deleted: Omeprazole  Changed: None    Allergies reviewed with patient and updates made in EHR: yes    Medication History Completed By: Shaquille Patiño [facility] 9/12/2024 6:48 AM    PTA Med List   Medication Sig Note Last Dose    acetaminophen (TYLENOL) 500 MG tablet Take 500-1,000 mg by mouth every 6 hours as needed for mild pain.  prn at prn    albuterol (PROAIR HFA/PROVENTIL HFA/VENTOLIN HFA) 108 (90 Base) MCG/ACT inhaler Inhale 2 puffs into the lungs every 6 hours as needed for shortness of breath, wheezing or cough.  More than a month at prn - not with    benzonatate (TESSALON) 100 MG capsule Take 100 mg by mouth 3 times daily as needed for cough.  prn at prn    calcium carbonate-vitamin D (CALTRATE) 600-10 MG-MCG per tablet Take 1 tablet by mouth 2 times daily.  Past Month at am    celecoxib (CELEBREX) 100 MG capsule TAKE 1 CAPSULE TWICE A DAY (Patient taking differently: TAKE 1 CAPSULE TWICE A DAY  Stopping 9/8/24 before surgery)  9/8/2024 at am    conjugated estrogens (PREMARIN) 0.625 MG/GM vaginal cream Place vaginally twice a week.  Past Month at am    famotidine (PEPCID) 20 MG tablet Take 20 mg by mouth daily  9/11/2024 at am    FLUoxetine (PROZAC) 20 MG capsule TAKE 1 CAPSULE DAILY  9/11/2024 at am    lisinopril (ZESTRIL) 10 MG tablet TAKE 1 TABLET DAILY  9/11/2024 at am    nortriptyline (PAMELOR) 10 MG capsule TAKE 1 CAPSULE AT BEDTIME  9/11/2024 at pm    nystatin (MYCOSTATIN) 290112 UNIT/GM external cream Apply topically 2 times daily To affected area as needed  prn at prn    polyethylene glycol-propylene glycol (SYSTANE ULTRA) 0.4-0.3 % SOLN ophthalmic solution  Place 1 drop into both eyes every hour as needed for dry eyes  9/10/2024 at not with    simvastatin (ZOCOR) 10 MG tablet TAKE 1 TABLET AT BEDTIME  9/11/2024 at pm    triamcinolone (KENALOG) 0.025 % external ointment 1 APPLICATION TWICE A DAY AS NEEDED TOPICALLY APPLY TO AFFECTED areas  prn at prn    triamterene-HCTZ (MAXZIDE) 75-50 MG tablet TAKE 1 TABLET DAILY 9/10/2024: Not taking the day of surgery 9/11/2024 at am    vitamin D3 (VITAMIN D3) 50 mcg (2000 units) tablet Take 4,000 Units by mouth daily   9/5/2024 at am

## 2024-09-12 NOTE — OP NOTE
Operative Note      PROCEDURE  RIGHT MINIMALLY INVASIVE TOTAL KNEE ARTHROPLASTY     Pre-Procedure Diagnosis:  PRIMARY OSTEOARTHRITIS OF RIGHT KNEE    Post-Procedure Diagnosis:    PRIMARY OSTEOARTHRITIS OF RIGHT KNEE    Surgeon(s):  MD Isra Toscano PA-C  A PAC was necessary to ensure safety of this patient and adequate progression of the procedure.    Anesthesia Type:  Spinal    Complications:  None    Condition on discharge from OR:  Stable    Estimated Blood Loss:   50 cc    Specimens:    None       Drains:   None    Implants:  5N RIGHT LEGION CR femur, 3 RIGHT ESTEFANY 2 tibia, 13 mm CR HF XLPE insert, and 32 mm patella ([employer] Legion knee)    Description:  After adequate anesthesia the right knee was prepped and draped in the usual sterile fashion.  We proceeded with an MIS midline incision through the skin and subcutaneous tissue and performed a midvastus approach.  The patella was everted and 10 mm of patellar bone was resected.  The knee was flexed, we identified Whitesides line and drilled the 6 degree intramedullary alignment guide.  We made a cut onto the anterior cortex and then a distal femoral cut of 9 mm.  I then sized the knee at a 5 and made the anterior, posterior, and champfer cuts.  I turned attention to the tibia.  The extramedullary guide was utilized to alignment and the tibial plateau cut was completed without difficulty.  A laminar  was placed and the posterior elements of the medial and lateral menisci and femoral osteophytes were removed.  I then injected the capsule with ropivicaine, toradol, and epi.  I trialed a 5N femur, a 3 tibia, and an 13 mm HF CR insert.  Alignment and soft tissue balance felt and looked excellent, so we marked and punched the components.  We then used pulsavac lavage to wash the bony surfaces, we dried the bony surfaces and then cemented the tibia, the femur, and the patella in that order.  With the components cemented in place  and all excess cement removed, we let the cement cure.  I put the tourniquet down and gained hemostasis.  Once the cement was cured, I locked the 13 mm CR HF XLPE insert and irrigated again.  We closed the arthrotomy with #1 ethibond and the VMO fascia with #1 vicryl.  The subQ was closed with 2.0 vicryl and the skin with staples.  Sterile bandages were applied and the patient was returned to the PACU in excellent condition.    Plan:  WBAT   F/U in 2 weeks   Leave Aquacel for 10 days   DVT Prophylaxis: ASA 81 mg PO BID for 40 days

## 2024-09-12 NOTE — PROGRESS NOTES
09/12/24 1614   Appointment Info   Signing Clinician's Name / Credentials (PT) Edmundo Jacome, VIKTOR   Quick Adds   Quick Adds Certification   Living Environment   People in Home child(maribel), adult   Current Living Arrangements house   Home Accessibility stairs to enter home;stairs within home   Number of Stairs, Main Entrance 3   Stair Railings, Main Entrance railings safe and in good condition   Number of Stairs, Within Home, Primary greater than 10 stairs   Stair Railings, Within Home, Primary railings safe and in good condition   Self-Care   Usual Activity Tolerance good   Current Activity Tolerance moderate   Equipment Currently Used at Home raised toilet seat;shower chair   Fall history within last six months no   Activity/Exercise/Self-Care Comment Indep with  all I ADL's, ADL's   General Information   Onset of Illness/Injury or Date of Surgery 09/12/24   Pertinent History of Current Problem (include personal factors and/or comorbidities that impact the POC) R TKA   Weight-Bearing Status - RLE weight-bearing as tolerated   Cognition   Affect/Mental Status (Cognition) WFL   Posture    Posture Not impaired   Range of Motion (ROM)   ROM Comment decreased ROM s/p  R TKA   Strength (Manual Muscle Testing)   Strength (Manual Muscle Testing) No deficits observed during functional mobility   Transfers   Transfers sit-stand transfer   Sit-Stand Transfer   Sit-Stand Silt (Transfers) contact guard;verbal cues   Assistive Device (Sit-Stand Transfers) walker, front-wheeled   Gait/Stairs (Locomotion)   [place] Level (Gait) contact guard;verbal cues   Assistive Device (Gait) walker, front-wheeled   Distance in Feet (Gait) 20   Pattern (Gait) step-to   Deviations/Abnormal Patterns (Gait) gait speed decreased;mayra decreased   Balance   Balance no deficits were identified   Sensory Examination   Sensory Perception patient reports no sensory changes   Sensory Perception Comments neuropathy on both feet    Coordination   Coordination no deficits were identified   Clinical Impression   Criteria for Skilled Therapeutic Intervention Yes, treatment indicated   PT Diagnosis (PT) impaired functional mobility   Influenced by the following impairments pain, decreased ROM, impaired balance, decreased strength   Functional limitations due to impairments gait, stairs, transfers   Clinical Presentation (PT Evaluation Complexity) stable   Clinical Presentation Rationale pt presents as medically diagnosed   Clinical Decision Making (Complexity) low complexity   Planned Therapy Interventions (PT) gait training;home exercise program;patient/family education;stair training;transfer training   Risk & Benefits of therapy have been explained care plan/treatment goals reviewed;patient   PT Total Evaluation Time   PT Eval, Low Complexity Minutes (66496) 10   Therapy Certification   Start of care date 09/12/24   Certification date from 09/12/24   Certification date to 10/12/24   Physical Therapy Goals   PT Frequency Daily   PT Predicted Duration/Target Date for Goal Attainment 09/14/24   PT Goals PT Goal 1;Transfers;Gait;Stairs   PT: Transfers Modified independent;Sit to/from stand;Assistive device   PT: Gait Modified independent;Rolling walker;150 feet   PT: Stairs 4 stairs;Within precautions;Rail on both sides;Minimal assist   PT: Goal 1 Independent with written HEP for LE strengthening and ROM   Interventions   Interventions Quick Adds Therapeutic Procedure;Therapeutic Activity;Gait Training   Therapeutic Procedure/Exercise   Ther. Procedure: strength, endurance, ROM, flexibillity Minutes (27540) 15   Symptoms Noted During/After Treatment none   Treatment Detail/Skilled Intervention TKA protocol therex x10 reps, cueing for technique,   Therapeutic Activity   Treatment Detail/Skilled Intervention sit to/from stand, cueing for safe hand placement and LE positioning   Gait Training   Gait Training Minutes (84506) 10   Symptoms Noted  During/After Treatment (Gait Training) increased pain   Treatment Detail/Skilled Intervention slow stable gait, vc for reciprocal steps and heel strike,  patient went up down 4 steps with bilateral rail, vc for non reciprocal steps.  Patient was walking out of PT gym and felt pain in anterior knee as she bent knee.  Pain was sharp and painful to bear weight.  Nursing pamela chair and had patient rolled back to room.  RN present and informed ortho of what happened   Distance in Feet 100   [place] Level (Gait Training) contact guard   Physical Assistance Level (Gait Training) 1 person assist;supervision;verbal cues   Weight Bearing (Gait Training) weight-bearing as tolerated   Assistive Device (Gait Training) rolling walker   Pattern Analysis (Gait Training) swing-through gait   Gait Analysis Deviations decreased stride length;decreased mayra   Impairments (Gait Analysis/Training) pain   Stair Railings present at both sides   Physical Assist/Nonphysical Assist (Stairs) supervision;verbal cues;1 person assist   Level of [place] (Stairs) contact guard   PT Discharge Planning   PT Plan Patient had sharp pain in knee when ambulating with PT, check ortho /nursing notes for any updates.  If no concerns resume TKA protocol   PT Discharge Recommendation (DC Rec) other (see comments)  (per ortho MD)   PT Rationale for DC Rec Patient CGA with gait/transfers and has good home setup and support   PT Brief overview of current status Patient cga with gait/transers   PT Equipment Needed at Discharge cane, straight;walker, rolling   Total Session Time   Timed Code Treatment Minutes 25   Total Session Time (sum of timed and untimed services) 35   [facility]  OUTPATIENT PHYSICAL THERAPY EVALUATION  PLAN OF TREATMENT FOR OUTPATIENT REHABILITATION  (COMPLETE FOR INITIAL CLAIMS ONLY)  Patient's Last Name, First Name, M.I.  YOB: 1945  Candelaria Hewitt                         Provider's Name  [facility] Medical Record No.  [id number]                             Onset Date:  09/12/24   Start of Care Date:  09/12/24   Type:     _X_PT   ___OT   ___SLP Medical Diagnosis:                 PT Diagnosis:  impaired functional mobility Visits from SOC:  1     See note for plan of treatment, functional goals and certification details    I CERTIFY THE NEED FOR THESE SERVICES FURNISHED UNDER        THIS PLAN OF TREATMENT AND WHILE UNDER MY CARE     (Physician co-signature of this document indicates review and certification of the therapy plan).

## 2024-09-12 NOTE — PROGRESS NOTES
1642:  Nurse called to the hallway where pt was working with PT and was suddenly unable to bear weight on her right leg. She heard a snap in her knee when she was bearing weight on her right and her foot was in the forward rocking motion at the end of her step.     Pt was assisted into her chair and rolled back into her room. Her pain during the incident went from 2/10 to 8-9/10.     [facility] surgeon on-call notified, orders to get patient up in a couple hours and see if she is able to bear weight again. If not, we will do a 3 view x-ray this evening to rule out anything.    1930:  Pt unable to bear weight, will proceed with ordering x-ray of knee, per provider order.

## 2024-09-12 NOTE — ANESTHESIA PROCEDURE NOTES
"Intrathecal injection Procedure Note    Pre-Procedure   Staff -        Anesthesiologist:  Christian Valera MD       Performed By: anesthesiologist       Location: OR       Procedure Start/Stop Times: 9/12/2024 7:25 AM and 9/12/2024 7:28 AM       Pre-Anesthestic Checklist: patient identified, IV checked, risks and benefits discussed, informed consent, monitors and equipment checked, pre-op evaluation, at physician/surgeon's request and post-op pain management  Timeout:       Correct Patient: Yes        Correct Procedure: Yes        Correct Site: Yes        Correct Position: Yes   Procedure Documentation  Procedure: intrathecal injection       Patient Position: sitting       Skin prep: Chloraprep       Insertion Site: L4-5. (midline approach).       Needle Gauge: 27.        Needle Length (Inches): 3.5        Spinal Needle Type: Pencan       Introducer used  Medication(s) Administered   0.75% Hyperbaric Bupivacaine (Intrathecal) - Intrathecal   1.6 mL - 9/12/2024 7:28:00 AM  Medication Administration Time: 9/12/2024 7:25 AM      FOR [facility] ([facility]) ONLY:   Pain Team Contact information: please page the Pain Team Via [employer]. Search \"Pain\". During daytime hours, please page the attending first. At night please page the resident first.      "

## 2024-09-12 NOTE — ANESTHESIA PREPROCEDURE EVALUATION
Anesthesia Pre-Procedure Evaluation    Patient: Candelaria Hewitt   MRN: [id number] : 1945        Procedure : Procedure(s):  RIGHT TOTAL KNEE ARTHROPLASTY          Past Medical History:   Diagnosis Date    Arthritis     Depression     Gastroesophageal reflux disease     Hyperlipidemia     Hypertension     Peripheral neuropathy     PONV (postoperative nausea and vomiting)     Prediabetes     Sleep apnea     CPAP      Past Surgical History:   Procedure Laterality Date    ABDOMEN SURGERY      removed polyps from uterus    CATARACT EXTRACTION Bilateral     ECTOPIC PREGNANCY SURGERY      ovary removed    HYSTERECTOMY      STRIP VEIN Bilateral       Allergies   Allergen Reactions    Haemophilus Influenzae      Other reaction(s): *Unknown    Influenza Vaccine Tri-Sp 09-10 [Influenza Vaccines] Unknown     Leg weakness for months, ? GBS    Morphine Nausea     Tolerate vicodin and codeine    Methocarbamol Rash      Social History     Tobacco Use    Smoking status: Former     Types: Cigarettes    Smokeless tobacco: Former   Substance Use Topics    Alcohol use: Yes     Alcohol/week: 1.0 standard drink of alcohol     Types: 1 Standard drinks or equivalent per week     Comment: 2 drinks per months      Wt Readings from Last 1 Encounters:   24 88 kg (194 lb)        Anesthesia Evaluation        No history of anesthetic complications       ROS/MED HX  ENT/Pulmonary:     (+) sleep apnea,                                       Neurologic:     (+)    peripheral neuropathy, - Peripheral.                           Cardiovascular:     (+) Dyslipidemia hypertension- -   -  - -                                      METS/Exercise Tolerance:     Hematologic:  - neg hematologic  ROS     Musculoskeletal:  - neg musculoskeletal ROS     GI/Hepatic:     (+) GERD,                   Renal/Genitourinary:  - neg Renal ROS     Endo:     (+)               Obesity,    Type II DM: Pre-DM.   Psychiatric/Substance Use:     (+)  "psychiatric history depression       Infectious Disease:  - neg infectious disease ROS     Malignancy:  - neg malignancy ROS     Other:  - neg other ROS          Physical Exam    Airway  airway exam normal      Mallampati: II   TM distance: > 3 FB   Neck ROM: full   Mouth opening: > 3 cm    Respiratory Devices and Support         Dental       (+) Completely normal teeth      Cardiovascular   cardiovascular exam normal          Pulmonary   pulmonary exam normal                OUTSIDE LABS:  CBC:   Lab Results   Component Value Date    WBC 5.8 08/21/2024    WBC 5.6 05/22/2024    HGB 14.8 08/21/2024    HGB 15.5 05/22/2024    HCT 44.2 08/21/2024    HCT 46.1 05/22/2024     08/21/2024     05/22/2024     BMP:   Lab Results   Component Value Date     08/21/2024     05/22/2024    POTASSIUM 4.3 08/21/2024    POTASSIUM 4.5 05/22/2024    CHLORIDE 98 08/21/2024    CHLORIDE 97 (L) 05/22/2024    CO2 25 08/21/2024    CO2 22 05/22/2024    BUN 22.2 08/21/2024    BUN 19.7 05/22/2024    CR 1.00 (H) 08/21/2024    CR 0.72 05/22/2024     (H) 08/21/2024     (H) 05/22/2024     COAGS: No results found for: \"PTT\", \"INR\", \"FIBR\"  POC: No results found for: \"BGM\", \"HCG\", \"HCGS\"  HEPATIC:   Lab Results   Component Value Date    ALBUMIN 4.7 08/21/2024    PROTTOTAL 7.9 08/21/2024    ALT 61 (H) 08/21/2024    AST 46 (H) 08/21/2024    ALKPHOS 93 08/21/2024    BILITOTAL 0.7 08/21/2024     OTHER:   Lab Results   Component Value Date    LACT 2.0 12/06/2021    A1C 6.1 (H) 05/22/2024    HERNANDO 9.7 08/21/2024    LIPASE 25 12/06/2021    TSH 1.88 02/09/2018       Anesthesia Plan    ASA Status:  2       Anesthesia Type: Spinal.              Consents    Anesthesia Plan(s) and associated risks, benefits, and realistic alternatives discussed. Questions answered and patient/representative(s) expressed understanding.     - Discussed:     - Discussed with:  Patient            Postoperative Care    Pain management: IV analgesics, " "Oral pain medications, Peripheral nerve block (Single Shot).   PONV prophylaxis: Ondansetron (or other 5HT-3)     Comments:               Christian Valera MD    I have reviewed the pertinent notes and labs in the chart from the past 30 days and (re)examined the patient.  Any updates or changes from those notes are reflected in this note.     # Hyponatremia: Lowest Na = 135 mmol/L in last 30 days, will monitor as appropriate          # Obesity: Estimated body mass index is 33.04 kg/m  as calculated from the following:    Height as of 8/21/24: 1.632 m (5' 4.25\").    Weight as of 8/21/24: 88 kg (194 lb).      "

## 2024-09-13 ENCOUNTER — APPOINTMENT (OUTPATIENT)
Dept: PHYSICAL THERAPY | Facility: CLINIC | Age: 79
End: 2024-09-13
Attending: ORTHOPAEDIC SURGERY
Payer: COMMERCIAL

## 2024-09-13 ENCOUNTER — APPOINTMENT (OUTPATIENT)
Dept: OCCUPATIONAL THERAPY | Facility: CLINIC | Age: 79
End: 2024-09-13
Attending: ORTHOPAEDIC SURGERY
Payer: COMMERCIAL

## 2024-09-13 VITALS
DIASTOLIC BLOOD PRESSURE: 63 MMHG | OXYGEN SATURATION: 94 % | SYSTOLIC BLOOD PRESSURE: 132 MMHG | BODY MASS INDEX: 32.44 KG/M2 | HEIGHT: 64 IN | WEIGHT: 190 LBS | RESPIRATION RATE: 18 BRPM | TEMPERATURE: 97.3 F | HEART RATE: 58 BPM

## 2024-09-13 LAB
FASTING STATUS PATIENT QL REPORTED: ABNORMAL
GLUCOSE SERPL-MCNC: 104 MG/DL (ref 70–99)
HGB BLD-MCNC: 11 G/DL (ref 11.7–15.7)

## 2024-09-13 PROCEDURE — 250N000011 HC RX IP 250 OP 636: Performed by: ORTHOPAEDIC SURGERY

## 2024-09-13 PROCEDURE — 250N000013 HC RX MED GY IP 250 OP 250 PS 637: Performed by: EMERGENCY MEDICINE

## 2024-09-13 PROCEDURE — 85018 HEMOGLOBIN: CPT | Performed by: ORTHOPAEDIC SURGERY

## 2024-09-13 PROCEDURE — 36415 COLL VENOUS BLD VENIPUNCTURE: CPT | Performed by: ORTHOPAEDIC SURGERY

## 2024-09-13 PROCEDURE — 250N000013 HC RX MED GY IP 250 OP 250 PS 637: Performed by: ORTHOPAEDIC SURGERY

## 2024-09-13 PROCEDURE — 97110 THERAPEUTIC EXERCISES: CPT | Mod: GP

## 2024-09-13 PROCEDURE — 82947 ASSAY GLUCOSE BLOOD QUANT: CPT | Performed by: ORTHOPAEDIC SURGERY

## 2024-09-13 PROCEDURE — 97116 GAIT TRAINING THERAPY: CPT | Mod: GP

## 2024-09-13 PROCEDURE — 97166 OT EVAL MOD COMPLEX 45 MIN: CPT | Mod: GO

## 2024-09-13 PROCEDURE — 99214 OFFICE O/P EST MOD 30 MIN: CPT | Performed by: INTERNAL MEDICINE

## 2024-09-13 PROCEDURE — 97535 SELF CARE MNGMENT TRAINING: CPT | Mod: GO

## 2024-09-13 RX ORDER — OXYCODONE HYDROCHLORIDE 5 MG/1
5-10 TABLET ORAL EVERY 4 HOURS PRN
Qty: 25 TABLET | Refills: 0 | Status: SHIPPED | OUTPATIENT
Start: 2024-09-13

## 2024-09-13 RX ORDER — TRIAMTERENE/HYDROCHLOROTHIAZID 37.5-25 MG
2 TABLET ORAL DAILY
Status: DISCONTINUED | OUTPATIENT
Start: 2024-09-13 | End: 2024-09-13 | Stop reason: HOSPADM

## 2024-09-13 RX ORDER — HYDROXYZINE HYDROCHLORIDE 10 MG/1
10 TABLET, FILM COATED ORAL EVERY 6 HOURS PRN
Qty: 25 TABLET | Refills: 0 | Status: SHIPPED | OUTPATIENT
Start: 2024-09-13

## 2024-09-13 RX ADMIN — KETOROLAC TROMETHAMINE 15 MG: 15 INJECTION, SOLUTION INTRAMUSCULAR; INTRAVENOUS at 01:59

## 2024-09-13 RX ADMIN — FLUOXETINE HYDROCHLORIDE 20 MG: 20 CAPSULE ORAL at 10:24

## 2024-09-13 RX ADMIN — LISINOPRIL 10 MG: 10 TABLET ORAL at 10:23

## 2024-09-13 RX ADMIN — FAMOTIDINE 20 MG: 20 TABLET, FILM COATED ORAL at 10:23

## 2024-09-13 RX ADMIN — KETOROLAC TROMETHAMINE 15 MG: 15 INJECTION, SOLUTION INTRAMUSCULAR; INTRAVENOUS at 10:24

## 2024-09-13 RX ADMIN — OXYCODONE HYDROCHLORIDE 5 MG: 5 TABLET ORAL at 04:32

## 2024-09-13 RX ADMIN — ACETAMINOPHEN 975 MG: 325 TABLET ORAL at 10:23

## 2024-09-13 RX ADMIN — ASPIRIN 81 MG: 81 TABLET, COATED ORAL at 10:25

## 2024-09-13 RX ADMIN — ACETAMINOPHEN 975 MG: 325 TABLET ORAL at 01:58

## 2024-09-13 RX ADMIN — SENNOSIDES AND DOCUSATE SODIUM 1 TABLET: 50; 8.6 TABLET ORAL at 10:23

## 2024-09-13 RX ADMIN — TRIAMTERENE AND HYDROCHLOROTHIAZIDE 2 TABLET: 37.5; 25 TABLET ORAL at 10:36

## 2024-09-13 ASSESSMENT — ACTIVITIES OF DAILY LIVING (ADL)
ADLS_ACUITY_SCORE: 23

## 2024-09-13 NOTE — PROGRESS NOTES
Charts reviewed and patient was examined  Daughter at bedside  Patient did have severe pain yesterday and though she may have popped something  Flees better today. X rays were reviewed     General Appearance: Awake, alert and not in distress  Respiratory: Clear breath sounds bilaterally   Cardiovascular: Normal heart sounds. No murmurs   GI: Soft, non tender. Normal bowel sounds   Skin: No bruising or bleeding   MSK: Rt Knee with dressings intact.No distal deficits   Other:Awake, alert and orientated X 3     Impression and plan:  78 yr old female patient with h/o HTN, KYLIE, dyslipidemia, mood disorder, admitted for Rt TKA.  Individual problems and their management are outlined below      S/P Rt Knee aeroplast:   Management by orthopedic team. [place] see their notes for further details    DVT prophylaxis with aspirin  Await post op Hgb   PT/OT as per protocol     HTN, essential: resume thiazide and  lisinopril   Mood disorder: resume prozac 20 mg daily; pamelor at night;   Dyslipidemia:  continue zocor  KYLIE:  on cpap      Dr RAJESH Hamilton MD, [facility]  Hospitalist ( Internal medicine)  Pager: [phone]

## 2024-09-13 NOTE — DISCHARGE SUMMARY
DISCHARGE    Patient appropriate for discharge. PIV was removed. AVS, prescriptions, and follow-up education reviewed. Stoplight tool and [facility] contact card given to patient. Pain is managed with ice packs and PO tylenol (see MAR for details). Patient ambulates with SBA and walker. All belongings remain with patient. Patient left the unit at 1430 via wheelchair with staff. Transportation to home with daughter.    Aparna Mcginnis RN

## 2024-09-13 NOTE — DISCHARGE SUMMARY
[facility] Discharge Summary                                  [facility]     MAVERICK JARRETT [id number]   Age: 78 year old  PCP: Mari Greene, [phone] 1945     Date of Admission:  9/12/2024  Date of Discharge::  9/13/2024  Discharge Provider:  SONU Daniel CNP    Code status:  Full Code    Admission Information:  Admission Diagnosis:  Osteoarthritis of right knee [M17.11]    Post-Operative Day: 1 Day Post-Op     Reason for admission:  The patient was admitted for the following:Procedure(s) (LRB):  RIGHT TOTAL KNEE ARTHROPLASTY (Right)    Active Problems:    Primary osteoarthritis of right knee      Allergies:  Haemophilus influenzae, Influenza vaccine tri-sp 09-10 [influenza vaccines], Morphine, and Methocarbamol    Following the procedure noted above the patient was transferred to the post-op floor and started on:    Therapy:  physical therapy and occupational therapy  Anticoagulation Plan: Aspirin 81 mg BID  for 40 days  Pain Management: scopainmedication: oxycodone, toradol, and tylenol  Weight bearing status: Weight bearing as tolerated     The patient was followed by Orthopedics during the inpatient treatment course:  Complications:  None  Additional consultations:  Hospitalist      Pertinent Labs   Lab Results: personally reviewed.     Recent Labs   Lab Test 09/13/24  1205 08/21/24  1149 05/22/24  1227 11/29/23  1050 08/23/22  1500 12/06/21  1003   WBC  --  5.8 5.6  --   --  12.8*   HGB 11.0* 14.8 15.5  --   --  15.9*   HCT  --  44.2 46.1  --   --  46.8   MCV  --  88 89  --   --  90   PLT  --  216 233  --   --  253   NA  --  135 135 135   < > 132*    < > = values in this interval not displayed.          Discharge Information:  Condition at discharge: Stable  Discharge destination:  Discharged to home     Medications at discharge:  Current Discharge Medication List        START taking these medications    Details   aspirin 81 MG EC tablet Take 1 tablet (81 mg)  by mouth 2 times daily.  Qty: 80 tablet, Refills: 0    Associated Diagnoses: Primary osteoarthritis of right knee      hydrOXYzine HCl (ATARAX) 10 MG tablet Take 1 tablet (10 mg) by mouth every 6 hours as needed for other (adjuvant pain).  Qty: 25 tablet, Refills: 0    Associated Diagnoses: Primary osteoarthritis of right knee      oxyCODONE (ROXICODONE) 5 MG tablet Take 1-2 tablets (5-10 mg) by mouth every 4 hours as needed for moderate pain.  Qty: 25 tablet, Refills: 0    Associated Diagnoses: Primary osteoarthritis of right knee      senna-docusate (SENOKOT-S/PERICOLACE) 8.6-50 MG tablet Take 1 tablet by mouth 2 times daily as needed for constipation.  Qty: 42 tablet, Refills: 0    Associated Diagnoses: Primary osteoarthritis of right knee           CONTINUE these medications which have NOT CHANGED    Details   acetaminophen (TYLENOL) 500 MG tablet Take 500-1,000 mg by mouth every 6 hours as needed for mild pain.      albuterol (PROAIR HFA/PROVENTIL HFA/VENTOLIN HFA) 108 (90 Base) MCG/ACT inhaler Inhale 2 puffs into the lungs every 6 hours as needed for shortness of breath, wheezing or cough.      benzonatate (TESSALON) 100 MG capsule Take 100 mg by mouth 3 times daily as needed for cough.      calcium carbonate-vitamin D (CALTRATE) 600-10 MG-MCG per tablet Take 1 tablet by mouth 2 times daily.      celecoxib (CELEBREX) 100 MG capsule TAKE 1 CAPSULE TWICE A DAY  Qty: 180 capsule, Refills: 2    Associated Diagnoses: Chronic pain of both knees      conjugated estrogens (PREMARIN) 0.625 MG/GM vaginal cream Place vaginally twice a week.      famotidine (PEPCID) 20 MG tablet Take 20 mg by mouth daily      FLUoxetine (PROZAC) 20 MG capsule TAKE 1 CAPSULE DAILY  Qty: 90 capsule, Refills: 2    Associated Diagnoses: Recurrent major depression in remission (H24)      lisinopril (ZESTRIL) 10 MG tablet TAKE 1 TABLET DAILY  Qty: 90 tablet, Refills: 2    Associated Diagnoses: Essential hypertension      nortriptyline (PAMELOR)  10 MG capsule TAKE 1 CAPSULE AT BEDTIME  Qty: 90 capsule, Refills: 0    Associated Diagnoses: Recurrent major depression in remission (H24)      nystatin (MYCOSTATIN) 931692 UNIT/GM external cream Apply topically 2 times daily To affected area as needed  Qty: 30 g, Refills: 1    Associated Diagnoses: Hair follicle infection      polyethylene glycol-propylene glycol (SYSTANE ULTRA) 0.4-0.3 % SOLN ophthalmic solution Place 1 drop into both eyes every hour as needed for dry eyes      simvastatin (ZOCOR) 10 MG tablet TAKE 1 TABLET AT BEDTIME  Qty: 90 tablet, Refills: 2    Associated Diagnoses: Mixed hyperlipidemia      triamcinolone (KENALOG) 0.025 % external ointment 1 APPLICATION TWICE A DAY AS NEEDED TOPICALLY APPLY TO AFFECTED areas  Qty: 15 g, Refills: 1    Associated Diagnoses: Hair follicle infection      triamterene-HCTZ (MAXZIDE) 75-50 MG tablet TAKE 1 TABLET DAILY  Qty: 90 tablet, Refills: 2    Associated Diagnoses: Essential hypertension      vitamin D3 (VITAMIN D3) 50 mcg (2000 units) tablet Take 4,000 Units by mouth daily                         Follow-Up Care:  Patient should be seen in the office in 14 days by the Orthopedic Surgeon/Physician Assistant.  Call [phone] for appointment or questions.    It was my pleasure to take care of the above patient.  SONU Daniel CNP

## 2024-09-13 NOTE — PLAN OF CARE
Physical Therapy Discharge Summary    Reason for therapy discharge:    Discharged to home with outpatient therapy.    Progress towards therapy goal(s). See goals on Care Plan in [facility] electronic health record for goal details.  Goals partially met.  Barriers to achieving goals:   discharge from facility.    Therapy recommendation(s):    Continued therapy is recommended.  Rationale/Recommendations:  Continue with OP PT post TKA.  Continue home exercise program.

## 2024-09-13 NOTE — PLAN OF CARE
"Patient vital signs are at baseline: Yes  Patient able to ambulate as they were prior to admission or with assist devices provided by therapies during their stay:  No,  Reason:  Assist of one to the bathroom and with ambulation  Patient MUST void prior to discharge:  Yes  Patient able to tolerate oral intake:  Yes  Pain has adequate pain control using Oral analgesics:  Yes  Does patient have an identified :  Yes  Has goal D/C date and time been discussed with patient:  Yes    IV antibiotics given. Worked with PT today. Using PO oxycodone for pain, with relief. Dressing intact, ACE re-wrapped because it was \"too tight, per pt\"    "

## 2024-09-13 NOTE — PROGRESS NOTES
09/13/24 0947   Appointment Info   Signing Clinician's Name / Credentials (OT) Pacheco Brower OTR/L   Quick Adds   Quick Adds Certification   Living Environment   People in Home child(maribel), adult   Current Living Arrangements house   Transportation Anticipated family or friend will provide   Living Environment Comments Pt has FWW, walkin shower w/ grab bar and shower chairs, RTS   Self-Care   Usual Activity Tolerance good   Current Activity Tolerance moderate   Equipment Currently Used at Home grab bar, tub/shower;shower chair;raised toilet seat;walker, rolling   Fall history within last six months no   Activity/Exercise/Self-Care Comment Pt IND w/ ADLs and IADLs at baseline   General Information   Onset of Illness/Injury or Date of Surgery 09/12/24   Referring Physician Sterling Rodriguez MD   Patient/Family Therapy Goal Statement (OT) wants to go home   Additional Occupational Profile Info/Pertinent History of Current Problem R TKA   Existing Precautions/Restrictions no known precautions/restrictions   Right Lower Extremity (Weight-bearing Status) weight-bearing as tolerated (WBAT)   Cognitive Status Examination   Orientation Status orientation to person, place and time   Affect/Mental Status (Cognitive) WNL   Visual Perception   Visual Impairment/Limitations corrective lenses full-time   Sensory   Sensory Quick Adds sensation intact   Pain Assessment   Patient Currently in Pain Yes, see Vital Sign flowsheet   Posture   Posture not impaired   Range of Motion Comprehensive   General Range of Motion no range of motion deficits identified   Strength Comprehensive (MMT)   General Manual Muscle Testing (MMT) Assessment no strength deficits identified   Bed Mobility   Bed Mobility supine-sit;sit-supine   Comment (Bed Mobility) SBA-CGA   Transfers   Transfers bed-chair transfer;sit-stand transfer;toilet transfer;shower transfer   Transfer Comments SBA-CGA   Activities of Daily Living   BADL Assessment/Intervention lower  body dressing;bathing;toileting   Bathing Assessment/Intervention   [place] Level (Bathing) minimum assist (75% patient effort)   Lower Body Dressing Assessment/Training   [place] Level (Lower Body Dressing) minimum assist (75% patient effort)   Toileting   [place] Level (Toileting) supervision   Clinical Impression   Criteria for Skilled Therapeutic Interventions Met (OT) Yes, treatment indicated   OT Diagnosis decreased ADLs   Influenced by the following impairments TKA   OT Problem List-Impairments impacting ADL activity tolerance impaired;pain;mobility   Assessment of Occupational Performance 3-5 Performance Deficits   Identified Performance Deficits LE dressing/bathing, bed mobility, all transfers   Planned Therapy Interventions (OT) ADL retraining;bed mobility training;transfer training   Clinical Decision Making Complexity (OT) detailed assessment/moderate complexity   Risk & Benefits of therapy have been explained evaluation/treatment results reviewed;patient;daughter   OT Total Evaluation Time   OT Eval, Moderate Complexity Minutes (51301) 10   Therapy Certification   Medical Diagnosis TKA   Start of Care Date 09/13/24   Certification date from 09/13/24   Certification date to 10/13/24   OT Goals   Therapy Frequency (OT) One time eval and treatment   OT Predicted Duration/Target Date for Goal Attainment 09/13/24   OT Goals Lower Body Dressing;Bed Mobility;Toilet Transfer/Toileting;Transfers   OT: Lower Body Dressing Modified independent;using adaptive equipment;Goal Met   OT: Bed Mobility Modified independent;supine to/from sitting;Goal Met   OT: Transfer Supervision/stand-by assist;with assistive device;Goal Met  (walkin shower)   OT: Toilet Transfer/Toileting Modified independent;toilet transfer;cleaning and garment management;Goal Met   Interventions   Interventions Quick Adds Self-Care/Home Management   Self-Care/Home Management   Self-Care/Home Mgmt/ADL, Compensatory, Meal Prep Minutes  (54021) 24   Symptoms Noted During/After Treatment (Meal Preparation/Planning Training) none   Treatment Detail/Skilled Intervention Pt edu on compensatory strategies for LE dressing - pt completed FB dressing Mod I w/ reacher and shoe horn. Pt will get assistance w/ socks at home from family. STS x3 w/ SBA and amb. ~15 ft to BR w/ FWW. Pt edu on safety technique for toilet/walkin shower transfer - completed each Mod I. Pt instructed on bed mobility techniques - completed Mod I. Pt edu on sleeping position and car transfers - pt verbalized understanding.   OT Discharge Planning   OT Plan DC OT   OT Discharge Recommendation (DC Rec)   (defer to ortho)   OT Rationale for DC Rec Pt tolerating therapy well. Good home setup w/ all necessary DME and family will always be home to assist as needed.   OT Brief overview of current status Mod I w/ ADLs, Min A w/ socks   Total Session Time   Timed Code Treatment Minutes 24   Total Session Time (sum of timed and untimed services) 34      [facility]  OUTPATIENT OCCUPATIONAL THERAPY  EVALUATION  PLAN OF TREATMENT FOR OUTPATIENT REHABILITATION  (COMPLETE FOR INITIAL CLAIMS ONLY)  Patient's Last Name, First Name, M.I.  YOB: 1945  RadhaCandelaria ghosh  CARLI                          Provider's Name  [facility] Medical Record No.  [id number]                             Onset Date:  09/12/24   Start of Care Date:  09/13/24   Type:     ___PT   _X_OT   ___SLP Medical Diagnosis:  TKA                    OT Diagnosis:  decreased ADLs Visits from SOC:  1     See note for plan of treatment, functional goals and certification details    I CERTIFY THE NEED FOR THESE SERVICES FURNISHED UNDER        THIS PLAN OF TREATMENT AND WHILE UNDER MY CARE     (Physician co-signature of this document indicates review and certification of the therapy plan).

## 2024-09-13 NOTE — PROGRESS NOTES
"[facility] Orthopaedics Progress Note      Post-operative Day: 1 Day Post-Op    Procedure(s):  RIGHT TOTAL KNEE ARTHROPLASTY    Subjective: Patient seen resting in bed, daughter at bedside. Reports yesterday while ambulating felt a \"pop\" with worsening pain. X-ray of knee completed showing no acute fracture or malalignment.Has not ambulated yet this AM, but feels pain is better. No new dizziness or lightheadedness. Tolerating a regular diet with no nausea or vomiting. Voiding and passing gas.     Pain: moderate  Chest pain, SOB:  No    Objective:  Blood pressure 101/56, pulse 67, temperature 97.5  F (36.4  C), temperature source Oral, resp. rate 16, height 1.626 m (5' 4\"), weight 86.2 kg (190 lb), SpO2 96%.    Patient Vitals for the past 24 hrs:   BP Temp Temp src Pulse Resp SpO2   09/12/24 2330 101/56 97.5  F (36.4  C) Oral 67 16 96 %   09/12/24 1930 129/64 98  F (36.7  C) Oral 86 20 93 %   09/12/24 1530 105/57 98  F (36.7  C) Oral 74 20 91 %   09/12/24 1430 111/53 -- -- 78 20 95 %   09/12/24 1400 109/56 97  F (36.1  C) Oral 72 20 95 %   09/12/24 1330 112/59 97.9  F (36.6  C) Oral 74 20 93 %   09/12/24 1300 114/58 98  F (36.7  C) Oral 78 18 94 %   09/12/24 1230 112/65 97.7  F (36.5  C) Oral 86 19 95 %   09/12/24 0940 103/55 97.7  F (36.5  C) Core 75 18 95 %   09/12/24 0930 108/55 97.9  F (36.6  C) Core 74 16 95 %   09/12/24 0920 106/55 97.5  F (36.4  C) Core 76 20 96 %   09/12/24 0910 101/55 98.4  F (36.9  C) Core 79 18 92 %   09/12/24 0902 104/53 98.1  F (36.7  C) Temporal 79 16 97 %       Wt Readings from Last 4 Encounters:   09/12/24 86.2 kg (190 lb)   08/21/24 88 kg (194 lb)   05/22/24 88.5 kg (195 lb)   01/23/24 86.6 kg (191 lb)       General: Alert and orientated, NAD  Respiratory: Non-labored breathing on RA  RLE motor function, sensation, and circulation intact   Yes, Dorsiflexion/plantarflexion intact and equal bilaterally. Moves all other extremities with ease and purpose   Wound status: incisions are " clean dry and intact.  Mild bloody drainage distal aspect of dressing   Calf tenderness: Bilateral  No    Pertinent Labs   Lab Results: personally reviewed.     Recent Labs   Lab Test 08/21/24  1149 05/22/24  1227 11/29/23  1050 08/23/22  1500 12/06/21  1003   WBC 5.8 5.6  --   --  12.8*   HGB 14.8 15.5  --   --  15.9*   HCT 44.2 46.1  --   --  46.8   MCV 88 89  --   --  90    233  --   --  253    135 135   < > 132*    < > = values in this interval not displayed.     Narrative & Impression   EXAM: XR KNEE RIGHT 1/2 VIEWS  LOCATION: [facility]  DATE: 9/12/2024     INDICATION: Sudden pain right in knee, while walking, then unable to bear weight.  COMPARISON: 3/29/2019                                                                      IMPRESSION:. Status post recent right total knee arthroplasty. No immediate hardware complication. Expected postsurgical soft tissue swelling, subcutaneous emphysema, gas containing joint effusion, and skin staples. No acute fracture or malalignment.   Atherosclerosis.       Plan:   Anticoagulation protocol: Aspirin 81 mg BID  x 40  days  Pain medications:  scopainmedication: oxycodone, toradol, and tylenol  Weight bearing status:  WBAT  Disposition:  Home today pending clearance from therapies and hospital medicine team    Continue cares and rehabilitation     Report completed by:  SONU Daniel CNP  Date: 9/13/2024  Time: 8:21 AM

## 2024-09-13 NOTE — PLAN OF CARE
Goal Outcome Evaluation:  Patient vital signs are at baseline: Yes  Patient able to ambulate as they were prior to admission or with assist devices provided by therapies during their stay:  No,  Reason:  RLE knee area painful with pressure. Able to ambulate with standby assist with walker. Independent with self-care  Patient MUST void prior to discharge:  Yes  Patient able to tolerate oral intake:  Yes  Pain has adequate pain control using Oral analgesics:  Yes  Does patient have an identified :  Yes  Has goal D/C date and time been discussed with patient:  Yes

## 2024-09-23 DIAGNOSIS — I10 ESSENTIAL HYPERTENSION: ICD-10-CM

## 2024-09-23 RX ORDER — LISINOPRIL 10 MG/1
10 TABLET ORAL DAILY
Qty: 90 TABLET | Refills: 3 | Status: SHIPPED | OUTPATIENT
Start: 2024-09-23

## 2024-09-23 RX ORDER — TRIAMTERENE AND HYDROCHLOROTHIAZIDE 75; 50 MG/1; MG/1
1 TABLET ORAL DAILY
Qty: 90 TABLET | Refills: 3 | Status: SHIPPED | OUTPATIENT
Start: 2024-09-23

## 2024-09-26 NOTE — ADDENDUM NOTE
Addendum  created 09/26/24 0946 by Christian Valera MD    Intraprocedure Event edited, SmartForm saved

## 2024-09-27 ENCOUNTER — TRANSFERRED RECORDS (OUTPATIENT)
Dept: HEALTH INFORMATION MANAGEMENT | Facility: CLINIC | Age: 79
End: 2024-09-27
Payer: COMMERCIAL

## 2024-10-08 DIAGNOSIS — G89.29 CHRONIC PAIN OF BOTH KNEES: ICD-10-CM

## 2024-10-08 DIAGNOSIS — M25.562 CHRONIC PAIN OF BOTH KNEES: ICD-10-CM

## 2024-10-08 DIAGNOSIS — E78.2 MIXED HYPERLIPIDEMIA: ICD-10-CM

## 2024-10-08 DIAGNOSIS — F33.40 RECURRENT MAJOR DEPRESSION IN REMISSION (H): ICD-10-CM

## 2024-10-08 DIAGNOSIS — M25.561 CHRONIC PAIN OF BOTH KNEES: ICD-10-CM

## 2024-10-10 RX ORDER — CELECOXIB 100 MG/1
CAPSULE ORAL
Qty: 180 CAPSULE | Refills: 0 | Status: SHIPPED | OUTPATIENT
Start: 2024-10-10

## 2024-10-10 RX ORDER — SIMVASTATIN 10 MG
10 TABLET ORAL AT BEDTIME
Qty: 90 TABLET | Refills: 0 | Status: SHIPPED | OUTPATIENT
Start: 2024-10-10

## 2024-10-14 DIAGNOSIS — F33.40 RECURRENT MAJOR DEPRESSION IN REMISSION (H): ICD-10-CM

## 2024-10-16 RX ORDER — NORTRIPTYLINE HYDROCHLORIDE 10 MG/1
CAPSULE ORAL
Qty: 90 CAPSULE | Refills: 3 | Status: SHIPPED | OUTPATIENT
Start: 2024-10-16

## 2024-10-22 ENCOUNTER — TELEPHONE (OUTPATIENT)
Dept: FAMILY MEDICINE | Facility: CLINIC | Age: 79
End: 2024-10-22
Payer: COMMERCIAL

## 2024-10-25 ENCOUNTER — TRANSFERRED RECORDS (OUTPATIENT)
Dept: HEALTH INFORMATION MANAGEMENT | Facility: CLINIC | Age: 79
End: 2024-10-25
Payer: COMMERCIAL

## 2024-10-30 ENCOUNTER — PATIENT OUTREACH (OUTPATIENT)
Dept: CARE COORDINATION | Facility: CLINIC | Age: 79
End: 2024-10-30
Payer: COMMERCIAL

## 2024-11-13 ENCOUNTER — PATIENT OUTREACH (OUTPATIENT)
Dept: CARE COORDINATION | Facility: CLINIC | Age: 79
End: 2024-11-13
Payer: COMMERCIAL

## 2025-01-02 ENCOUNTER — ANCILLARY PROCEDURE (OUTPATIENT)
Dept: MAMMOGRAPHY | Facility: CLINIC | Age: 80
End: 2025-01-02
Attending: FAMILY MEDICINE
Payer: COMMERCIAL

## 2025-01-02 DIAGNOSIS — Z12.31 VISIT FOR SCREENING MAMMOGRAM: ICD-10-CM

## 2025-01-02 PROCEDURE — 77063 BREAST TOMOSYNTHESIS BI: CPT

## 2025-01-08 DIAGNOSIS — E78.2 MIXED HYPERLIPIDEMIA: ICD-10-CM

## 2025-01-08 DIAGNOSIS — M25.562 CHRONIC PAIN OF BOTH KNEES: ICD-10-CM

## 2025-01-08 DIAGNOSIS — G89.29 CHRONIC PAIN OF BOTH KNEES: ICD-10-CM

## 2025-01-08 DIAGNOSIS — F33.40 RECURRENT MAJOR DEPRESSION IN REMISSION: ICD-10-CM

## 2025-01-08 DIAGNOSIS — M25.561 CHRONIC PAIN OF BOTH KNEES: ICD-10-CM

## 2025-01-08 RX ORDER — CELECOXIB 100 MG/1
CAPSULE ORAL
Qty: 180 CAPSULE | Refills: 0 | Status: SHIPPED | OUTPATIENT
Start: 2025-01-08

## 2025-01-08 RX ORDER — SIMVASTATIN 10 MG
TABLET ORAL
Qty: 90 TABLET | Refills: 0 | Status: SHIPPED | OUTPATIENT
Start: 2025-01-08

## 2025-01-08 NOTE — TELEPHONE ENCOUNTER
Please call patient. They are due for an office visit /follow up and possibly labs.  Refilled [id number] you . Mari Greene M.D.

## 2025-01-08 NOTE — TELEPHONE ENCOUNTER
Requested Prescriptions   Pending Prescriptions Disp Refills    celecoxib (CELEBREX) 100 MG capsule [Pharmacy Med Name: CELECOXIB CAPS 100MG] 180 capsule 3     Sig: TAKE 1 CAPSULE TWICE A DAY (IN PERSON OFFICE VISIT NEEDED FOR FURTHER REFILLS)       NSAID Medications Failed - 1/8/2025  1:48 PM        Failed - Patient is age 6-64 years        Failed - Normal CBC on file in past 12 months     Recent Labs   Lab Test 09/13/24  1205 08/21/24  1149   WBC  --  5.8   RBC  --  5.01   HGB 11.0* 14.8   HCT  --  44.2   PLT  --  216                 Failed - Always Fail Criteria - Chart Review Required     Validate Diagnosis. If the medication is requested for an acute flare of a chronic pain associated with a musculoskeletal or rheumatologic condition; okay to authorize if all other criteria are met. If not, then forward to provider for review.          Failed - Normal GFR on file in past 12 months     Recent Labs   Lab Test 08/21/24  1149 08/24/21  1606 08/18/20  1129   GFRESTIMATED 58*   < > >60   GFRESTBLACK  --   --  >60    < > = values in this interval not displayed.             Passed - Most recent blood pressure under 140/90 in past 12 months     BP Readings from Last 3 Encounters:   09/13/24 132/63   08/21/24 122/68   05/22/24 132/74       No data recorded            Passed - Medication is active on med list        Passed - Recent (12 mo) or future (90 days) visit within the authorizing provider's specialty     The patient must have completed an in-person or virtual visit within the past 12 months or has a future visit scheduled within the next 90 days with the authorizing provider s specialty.  Urgent care and e-visits do not qualify as an office visit for this protocol.          Passed - No active pregnancy on record        Passed - No positive pregnancy test in past 12 months          FLUoxetine (PROZAC) 20 MG capsule [Pharmacy Med Name: FLUOXETINE HCL CAPS 20MG] 90 capsule 0     Sig: TAKE 1 CAPSULE DAILY (IN PERSON  OFFICE VISIT NEEDED FOR FURTHER REFILLS)       SSRIs Protocol Passed - 1/8/2025  1:48 PM        Passed - PHQ-9 score less than 5 in past 6 months     Please review last PHQ-9 score.           Passed - Medication is active on med list        Passed - Recent (12 mo) or future (90 days) visit within the authorizing provider's specialty     The patient must have completed an in-person or virtual visit within the past 12 months or has a future visit scheduled within the next 90 days with the authorizing provider s specialty.  Urgent care and e-visits do not qualify as an office visit for this protocol.          Passed - Medication indicated for associated diagnosis     Medication is associated with one or more of the following diagnoses:              Anxiety             Bipolar  Depression  Obsessive-compulsive disorder             Panic disorder  Postmenopausal flushing             Premenstrual dysphoric disorder             Social phobia   Adjustment disorder with depressed mood   Mood disorder   Adjustment disorder with anxious mood          Passed - Patient is age 18 or older        Passed - No active pregnancy on record        Passed - No positive pregnancy test in last 12 months         Drug interaction with fluoxetine.         Last office visit: 8/21/2024 ; last virtual visit: Visit date not found with prescribing provider:     Future Office Visit:   Next 5 appointments (look out 90 days)      Jan 14, 2025 10:00 AM  (Arrive by 9:40 AM)  Annual Wellness Visit with Mari Greene MD  [facility] [place] ([facility] ) [address] [place] [address] [phone]                                    Julie Behrendt RN

## 2025-01-08 NOTE — TELEPHONE ENCOUNTER
Landy refill given x 1, patient has upcoming appt 1/14/25 with Dr. Mari Greene.   Prescription approved per [facility] Refill Protocol.  Julie Behrendt RN

## 2025-01-14 ENCOUNTER — OFFICE VISIT (OUTPATIENT)
Dept: FAMILY MEDICINE | Facility: CLINIC | Age: 80
End: 2025-01-14
Payer: COMMERCIAL

## 2025-01-14 VITALS
OXYGEN SATURATION: 98 % | WEIGHT: 197 LBS | DIASTOLIC BLOOD PRESSURE: 70 MMHG | TEMPERATURE: 98.4 F | HEART RATE: 73 BPM | BODY MASS INDEX: 33.63 KG/M2 | HEIGHT: 64 IN | SYSTOLIC BLOOD PRESSURE: 132 MMHG

## 2025-01-14 DIAGNOSIS — M25.562 CHRONIC PAIN OF BOTH KNEES: ICD-10-CM

## 2025-01-14 DIAGNOSIS — M25.561 CHRONIC PAIN OF BOTH KNEES: ICD-10-CM

## 2025-01-14 DIAGNOSIS — G89.29 CHRONIC PAIN OF BOTH KNEES: ICD-10-CM

## 2025-01-14 DIAGNOSIS — F33.40 RECURRENT MAJOR DEPRESSION IN REMISSION: ICD-10-CM

## 2025-01-14 DIAGNOSIS — Z78.0 POSTMENOPAUSAL STATUS: ICD-10-CM

## 2025-01-14 DIAGNOSIS — E78.2 MIXED HYPERLIPIDEMIA: ICD-10-CM

## 2025-01-14 DIAGNOSIS — Z00.00 ENCOUNTER FOR MEDICARE ANNUAL WELLNESS EXAM: Primary | ICD-10-CM

## 2025-01-14 LAB
ALBUMIN SERPL BCG-MCNC: 4.5 G/DL (ref 3.5–5.2)
ALP SERPL-CCNC: 95 U/L (ref 40–150)
ALT SERPL W P-5'-P-CCNC: 45 U/L (ref 0–50)
ANION GAP SERPL CALCULATED.3IONS-SCNC: 13 MMOL/L (ref 7–15)
AST SERPL W P-5'-P-CCNC: 36 U/L (ref 0–45)
BILIRUB SERPL-MCNC: 0.4 MG/DL
BUN SERPL-MCNC: 21.9 MG/DL (ref 8–23)
CALCIUM SERPL-MCNC: 9.6 MG/DL (ref 8.8–10.4)
CHLORIDE SERPL-SCNC: 100 MMOL/L (ref 98–107)
CREAT SERPL-MCNC: 0.92 MG/DL (ref 0.51–0.95)
EGFRCR SERPLBLD CKD-EPI 2021: 63 ML/MIN/1.73M2
GLUCOSE SERPL-MCNC: 104 MG/DL (ref 70–99)
HCO3 SERPL-SCNC: 24 MMOL/L (ref 22–29)
POTASSIUM SERPL-SCNC: 4.5 MMOL/L (ref 3.4–5.3)
PROT SERPL-MCNC: 7.1 G/DL (ref 6.4–8.3)
SODIUM SERPL-SCNC: 137 MMOL/L (ref 135–145)

## 2025-01-14 PROCEDURE — 80053 COMPREHEN METABOLIC PANEL: CPT | Performed by: FAMILY MEDICINE

## 2025-01-14 PROCEDURE — G0439 PPPS, SUBSEQ VISIT: HCPCS | Performed by: FAMILY MEDICINE

## 2025-01-14 PROCEDURE — 99213 OFFICE O/P EST LOW 20 MIN: CPT | Mod: 25 | Performed by: FAMILY MEDICINE

## 2025-01-14 PROCEDURE — 36415 COLL VENOUS BLD VENIPUNCTURE: CPT | Performed by: FAMILY MEDICINE

## 2025-01-14 RX ORDER — CELECOXIB 100 MG/1
CAPSULE ORAL
Qty: 180 CAPSULE | Refills: 3 | Status: SHIPPED | OUTPATIENT
Start: 2025-01-14

## 2025-01-14 RX ORDER — SIMVASTATIN 10 MG
10 TABLET ORAL AT BEDTIME
Qty: 90 TABLET | Refills: 3 | Status: SHIPPED | OUTPATIENT
Start: 2025-01-14

## 2025-01-14 SDOH — HEALTH STABILITY: PHYSICAL HEALTH: ON AVERAGE, HOW MANY DAYS PER WEEK DO YOU ENGAGE IN MODERATE TO STRENUOUS EXERCISE (LIKE A BRISK WALK)?: 3 DAYS

## 2025-01-14 ASSESSMENT — SOCIAL DETERMINANTS OF HEALTH (SDOH): HOW OFTEN DO YOU GET TOGETHER WITH FRIENDS OR RELATIVES?: TWICE A WEEK

## 2025-01-14 ASSESSMENT — PATIENT HEALTH QUESTIONNAIRE - PHQ9
SUM OF ALL RESPONSES TO PHQ QUESTIONS 1-9: 3
SUM OF ALL RESPONSES TO PHQ QUESTIONS 1-9: 3
10. IF YOU CHECKED OFF ANY PROBLEMS, HOW DIFFICULT HAVE THESE PROBLEMS MADE IT FOR YOU TO DO YOUR WORK, TAKE CARE OF THINGS AT HOME, OR GET ALONG WITH OTHER PEOPLE: NOT DIFFICULT AT ALL

## 2025-01-14 NOTE — PROGRESS NOTES
Preventive Care Visit  [facility] EZEKIEL Greene MD, Family Medicine  Jan 14, 2025  {Provider  Link to SmartSet :466026}    {PROVIDER CHARTING PREFERENCE:572307}    Enedelia Gaona is a 79 year old, presenting for the following:  Medicare Visit        1/14/2025     9:48 AM   Additional Questions   Roomed by Kristina EID CMA       HPI    Hyperlipidemia Follow-Up  Are you regularly taking any medication or supplement to lower your cholesterol?   Yes- Simvastatin  Are you having muscle aches or other side effects that you think could be caused by your cholesterol lowering medication?  No  Recent Labs   Lab Test 08/21/24  1149 11/16/21  1308   CHOL 207* 190   HDL 61 54   * 99   TRIG 123 185*     Hypertension Follow-up  Do you check your blood pressure regularly outside of the clinic? Yes   Are you following a low salt diet? Yes  Are your blood pressures ever more than 140 on the top number (systolic) OR more   than 90 on the bottom number (diastolic), for example 140/90? No  BP Readings from Last 3 Encounters:   01/14/25 132/70   09/13/24 132/63   08/21/24 122/68     Depression   How are you doing with your depression since your last visit? Stable  Are you having other symptoms that might be associated with depression? No  Have you had a significant life event?  OTHER: sisters illness    Are you feeling anxious or having panic attacks?   No  Do you have any concerns with your use of alcohol or other drugs? No    Social History     Tobacco Use    Smoking status: Former     Types: Cigarettes    Smokeless tobacco: Former   Vaping Use    Vaping status: Never Used   Substance Use Topics    Alcohol use: Yes     Alcohol/week: 1.0 standard drink of alcohol     Types: 1 Standard drinks or equivalent per week     Comment: 2 drinks per months    Drug use: No         1/23/2024     1:37 PM 8/21/2024    10:40 AM 1/14/2025     9:39 AM   PHQ   PHQ-9 Total Score 4 3 3    Q9: Thoughts of better off  dead/self-harm past 2 weeks Not at all Not at all Not at all       Patient-reported         4/6/2021     2:00 PM 11/29/2023     9:38 AM   HARRISON-7 SCORE   Total Score  4 (minimal anxiety)   Total Score 2 4         Suicide Assessment Five-step Evaluation and Treatment (SAFE-T)          Health Care Directive  Patient does not have a Health Care Directive: Patient states has Advance Directive and will bring in a copy to clinic.      1/14/2025   General Health   How would you rate your overall physical health? Good   Feel stress (tense, anxious, or unable to sleep) Only a little   (!) STRESS CONCERN      1/14/2025   Nutrition   Diet: Regular (no restrictions)         1/14/2025   Exercise   Days per week of moderate/strenous exercise 3 days         1/14/2025   Social Factors   Frequency of gathering with friends or relatives Twice a week   Worry food won't last until get money to buy more No   Food not last or not have enough money for food? No   Do you have housing? (Housing is defined as stable permanent housing and does not include staying ouside in a car, in a tent, in an abandoned building, in an overnight shelter, or couch-surfing.) Yes   Are you worried about losing your housing? No   Lack of transportation? No   Unable to get utilities (heat,electricity)? Yes   Want help with housing or utility concern? No   (!) FINANCIAL RESOURCE STRAIN CONCERN      1/14/2025   Fall Risk   Fallen 2 or more times in the past year? No   Trouble with walking or balance? Yes     {Positive Fall Risk- Gait Speed Test is required and was not documented before note was started.  If results do not appear, ask staff to complete.  Once completed, refresh note to pull in results Click here to link Gait Speed Test  :[id number]}      1/14/2025   Activities of Daily Living- Home Safety   Needs help with the following daily activites None of the above   Safety concerns in the home None of the above         1/14/2025   Dental   Dentist two times every  year? Yes         1/14/2025   Hearing Screening   Hearing concerns? None of the above         1/14/2025   Driving Risk Screening   Patient/family members have concerns about driving No         1/14/2025   General Alertness/Fatigue Screening   Have you been more tired than usual lately? No         1/14/2025   Urinary Incontinence Screening   Bothered by leaking urine in past 6 months Yes         1/14/2025   TB Screening   Were you born outside of the [place]? No       Today's PHQ-9 Score:       1/14/2025     9:39 AM   PHQ-9 SCORE   PHQ-9 Total Score MyChart 3 (Minimal depression)   PHQ-9 Total Score 3        Patient-reported         1/14/2025   Substance Use   Alcohol more than 3/day or more than 7/wk No   Do you have a current opioid prescription? No   How severe/bad is pain from 1 to 10? 2/10   Do you use any other substances recreationally? No     Social History     Tobacco Use    Smoking status: Former     Types: Cigarettes    Smokeless tobacco: Former   Vaping Use    Vaping status: Never Used   Substance Use Topics    Alcohol use: Yes     Alcohol/week: 1.0 standard drink of alcohol     Types: 1 Standard drinks or equivalent per week     Comment: 2 drinks per months    Drug use: No     {Provider  If there are gaps in the social history shown above, please follow the link to update and then refresh the note Link to Social and Substance History :[id number]}      1/2/2025   LAST FHS-7 RESULTS   1st degree relative breast or ovarian cancer Yes   Any relative bilateral breast cancer No   Any male have breast cancer No   Any ONE woman have BOTH breast AND ovarian cancer No   Any woman with breast cancer before 50yrs No   2 or more relatives with breast AND/OR ovarian cancer No   2 or more relatives with breast AND/OR bowel cancer Yes     {If any of the questions to the FHS7 are answered yes, consider referral for genetic counseling.    Additional indications for genetic referral include personal history of breast or ovarian  cancer, genetic mutation in 1st degree relative which increases risk of breast cancer including BRCA1, BRCA2, ANA M, PALB 2, TP53, CHEK2, PTEN, CDH1, STK11 (per ACS) and/or 1st degree relative with history of pancreatic or high-risk prostate cancer (per NCCN):[id number]}   Mammogram Screening - Mammogram every 1-2 years updated in Health Maintenance based on mutual decision making    ASCVD Risk   The 10-year ASCVD risk score (Edd DAY, et al., 2019) is: 30.1%    Values used to calculate the score:      Age: 79 years      Sex: Female      Is Non- : No      Diabetic: No      Tobacco smoker: No      Systolic Blood Pressure: 132 mmHg      Is BP treated: Yes      HDL Cholesterol: 61 mg/dL      Total Cholesterol: 207 mg/dL        {Provider  REQUIRED FOR AWV Use the storyboard to review patient history, after sections have been marked as reviewed, refresh note to capture documentation:[id number]}  {Provider   REQUIRED AWV use this link to review and update sexual activity history  after section has been marked as reviewed, refresh note to capture documentation:[id number]}  Reviewed and updated as needed this visit by Provider                    Past Medical History:   Diagnosis Date    Arthritis     Depression     Gastroesophageal reflux disease     Hyperlipidemia     Hypertension     Peripheral neuropathy     PONV (postoperative nausea and vomiting)     Prediabetes     Sleep apnea     CPAP     Current providers sharing in care for this patient include:  Patient Care Team:  Mari Greene MD as PCP - General (Family Medicine)  Mari Greene MD as Assigned PCP    The following health maintenance items are reviewed in Epic and correct as of today:  Health Maintenance   Topic Date Due    LUNG CANCER SCREENING  Never done    INFLUENZA VACCINE (1) Never done    COVID-19 Vaccine (5 - 2024-25 season) 09/01/2024    ANNUAL REVIEW OF HM ORDERS  11/29/2024    PHQ-9  07/14/2025    BMP  08/21/2025    LIPID   "08/21/2025    MEDICARE ANNUAL WELLNESS VISIT  01/14/2026    FALL RISK ASSESSMENT  01/14/2026    GLUCOSE  09/13/2027    DTAP/TDAP/TD IMMUNIZATION (4 - Td or Tdap) 02/09/2028    ADVANCE CARE PLANNING  08/21/2029    DEXA  04/26/2034    HEPATITIS C SCREENING  Completed    DEPRESSION ACTION PLAN  Completed    Pneumococcal Vaccine: 50+ Years  Completed    ZOSTER IMMUNIZATION  Completed    RSV VACCINE  Completed    HPV IMMUNIZATION  Aged Out    MENINGITIS IMMUNIZATION  Aged Out    RSV MONOCLONAL ANTIBODY  Aged Out    MAMMO SCREENING  Discontinued    COLORECTAL CANCER SCREENING  Discontinued         Review of Systems  Constitutional, HEENT, cardiovascular, pulmonary, gi and gu systems are negative, except as otherwise noted.     Objective    Exam  /70 (BP Location: Right arm, Patient Position: Sitting, Cuff Size: Adult Regular)   Pulse 73   Temp 98.4  F (36.9  C) (Tympanic)   Ht 1.626 m (5' 4\")   Wt 89.4 kg (197 lb)   LMP  (LMP Unknown)   SpO2 98%   BMI 33.81 kg/m     Estimated body mass index is 33.81 kg/m  as calculated from the following:    Height as of this encounter: 1.626 m (5' 4\").    Weight as of this encounter: 89.4 kg (197 lb).    Physical Exam  GENERAL: alert and no distress  EYES: Eyes grossly normal to inspection, PERRL and conjunctivae and sclerae normal  HENT: ear canals and TM's normal, nose and mouth without ulcers or lesions  NECK: no adenopathy, no asymmetry, masses, or scars  RESP: lungs clear to auscultation - no rales, rhonchi or wheezes  CV: regular rate and rhythm, normal S1 S2, no S3 or S4, no murmur, click or rub, no peripheral edema  ABDOMEN: soft, nontender, no hepatosplenomegaly, no masses and bowel sounds normal  MS: no gross musculoskeletal defects noted, no edema  PSYCH: mentation appears normal, affect normal/bright         1/14/2025   Mini Cog   Clock Draw Score 2 Normal   3 Item Recall 3 objects recalled   Mini Cog Total Score 5     {A Mini-Cog total score of 0-2 suggests the " possibility of dementia, score of 3-5 suggests no dementia:076943}         Signed Electronically by: Mari Greene MD  {Email feedback regarding this note to [email or web address]   :[id number]}

## 2025-01-14 NOTE — PATIENT INSTRUCTIONS
Patient Education   Preventive Care Advice   This is general advice given by our system to help you stay healthy. However, your care team may have specific advice just for you. Please talk to your care team about your preventive care needs.  Nutrition  Eat 5 or more servings of fruits and vegetables each day.  Try wheat bread, brown rice and whole grain pasta (instead of white bread, rice, and pasta).  Get enough calcium and vitamin D. Check the label on foods and aim for 100% of the RDA (recommended daily allowance).  Lifestyle  Exercise at least 150 minutes each week  (30 minutes a day, 5 days a week).  Do muscle strengthening activities 2 days a week. These help control your weight and prevent disease.  No smoking.  Wear sunscreen to prevent skin cancer.  Have a dental exam and cleaning every 6 months.  Yearly exams  See your health care team every year to talk about:  Any changes in your health.  Any medicines your care team has prescribed.  Preventive care, family planning, and ways to prevent chronic diseases.  Shots (vaccines)   HPV shots (up to age 26), if you've never had them before.  Hepatitis B shots (up to age 59), if you've never had them before.  COVID-19 shot: Get this shot when it's due.  Flu shot: Get a flu shot every year.  Tetanus shot: Get a tetanus shot every 10 years.  Pneumococcal, hepatitis A, and RSV shots: Ask your care team if you need these based on your risk.  Shingles shot (for age 50 and up)  General health tests  Diabetes screening:  Starting at age 35, Get screened for diabetes at least every 3 years.  If you are younger than age 35, ask your care team if you should be screened for diabetes.  Cholesterol test: At age 39, start having a cholesterol test every 5 years, or more often if advised.  Bone density scan (DEXA): At age 50, ask your care team if you should have this scan for osteoporosis (brittle bones).  Hepatitis C: Get tested at least once in your life.  STIs (sexually  transmitted infections)  Before age 24: Ask your care team if you should be screened for STIs.  After age 24: Get screened for STIs if you're at risk. You are at risk for STIs (including HIV) if:  You are sexually active with more than one person.  You don't use condoms every time.  You or a partner was diagnosed with a sexually transmitted infection.  If you are at risk for HIV, ask about PrEP medicine to prevent HIV.  Get tested for HIV at least once in your life, whether you are at risk for HIV or not.  Cancer screening tests  Cervical cancer screening: If you have a cervix, begin getting regular cervical cancer screening tests starting at age 21.  Breast cancer scan (mammogram): If you've ever had breasts, begin having regular mammograms starting at age 40. This is a scan to check for breast cancer.  Colon cancer screening: It is important to start screening for colon cancer at age 45.  Have a colonoscopy test every 10 years (or more often if you're at risk) Or, ask your provider about stool tests like a FIT test every year or Cologuard test every 3 years.  To learn more about your testing options, visit:   .  For help making a decision, visit:   https://bit.ly/ke23418.  Prostate cancer screening test: If you have a prostate, ask your care team if a prostate cancer screening test (PSA) at age 55 is right for you.  Lung cancer screening: If you are a current or former smoker ages 50 to 80, ask your care team if ongoing lung cancer screenings are right for you.  For informational purposes only. Not to replace the advice of your health care provider. Copyright   2023 [facility]. All rights reserved. Clinically reviewed by the [facility]. [employer] 080570 - REV 01/24.  Preventing Falls: Care Instructions  Injuries and health problems such as trouble walking or poor eyesight can increase your risk of falling. So can some medicines. But there are things you can do to help  "prevent falls. You can exercise to get stronger. You can also arrange your home to make it safer.    Talk to your doctor about the medicines you take. Ask if any of them increase the risk of falls and whether they can be changed or stopped.   Try to exercise regularly. It can help improve your strength and balance. This can help lower your risk of falling.         Practice fall safety and prevention.   Wear low-heeled shoes that fit well and give your feet good support. Talk to your doctor if you have foot problems that make this hard.  Carry a cellphone or wear a medical alert device that you can use to call for help.  Use stepladders instead of chairs to reach high objects. Don't climb if you're at risk for falls. Ask for help, if needed.  Wear the correct eyeglasses, if you need them.        Make your home safer.   Remove rugs, cords, clutter, and furniture from walkways.  Keep your house well lit. Use night-lights in hallways and bathrooms.  Install and use sturdy handrails on stairways.  Wear nonskid footwear, even inside. Don't walk barefoot or in socks without shoes.        Be safe outside.   Use handrails, curb cuts, and ramps whenever possible.  Keep your hands free by using a shoulder bag or backpack.  Try to walk in well-lit areas. Watch out for uneven ground, changes in pavement, and debris.  Be careful in the winter. Walk on the grass or gravel when sidewalks are slippery. Use de-icer on steps and walkways. Add non-slip devices to shoes.    Put grab bars and nonskid mats in your shower or tub and near the toilet. Try to use a shower chair or bath bench when bathing.   Get into a tub or shower by putting in your weaker leg first. Get out with your strong side first. Have a phone or medical alert device in the bathroom with you.   Where can you learn more?  Go to https://www.[employer].net/patiented  Enter G117 in the search box to learn more about \"Preventing Falls: Care Instructions.\"  Current as of: " July 31, 2024  Content Version: 14.3    2024 [employer].   Care instructions adapted under license by your healthcare professional. If you have questions about a medical condition or this instruction, always ask your healthcare professional. [employer] disclaims any warranty or liability for your use of this information.    Bladder Training: Care Instructions  Your Care Instructions     Bladder training is used to treat urge incontinence and stress incontinence. Urge incontinence means that the need to urinate comes on so fast that you can't get to a toilet in time. Stress incontinence means that you leak urine because of pressure on your bladder. For example, it may happen when you laugh, cough, or lift something heavy.  Bladder training can increase how long you can wait before you have to urinate. It can also help your bladder hold more urine. And it can give you better control over the urge to urinate.  It is important to remember that bladder training takes a few weeks to a few months to make a difference. You may not see results right away, but don't give up.  Follow-up care is a key part of your treatment and safety. Be sure to make and go to all appointments, and call your doctor if you are having problems. It's also a good idea to know your test results and keep a list of the medicines you take.  How can you care for yourself at home?  Work with your doctor to come up with a bladder training program that is right for you. You may use one or more of the following methods.  Delayed urination  In the beginning, try to keep from urinating for 5 minutes after you first feel the need to go.  While you wait, take deep, slow breaths to relax. Kegel exercises can also help you delay the need to go to the bathroom.  After some practice, when you can easily wait 5 minutes to urinate, try to wait 10 minutes before you urinate.  Slowly increase the waiting period until you are able to control  "when you have to urinate.  Scheduled urination  Empty your bladder when you first wake up in the morning.  Schedule times throughout the day when you will urinate.  Start by going to the bathroom every hour, even if you don't need to go.  Slowly increase the time between trips to the bathroom.  When you have found a schedule that works well for you, keep doing it.  If you wake up during the night and have to urinate, do it. Apply your schedule to waking hours only.  Kegel exercises  These tighten and strengthen pelvic muscles, which can help you control the flow of urine. (If doing these exercises causes pain, stop doing them and talk with your doctor.) To do Kegel exercises:  Squeeze your muscles as if you were trying not to pass gas. Or squeeze your muscles as if you were stopping the flow of urine. Your belly, legs, and buttocks shouldn't move.  Hold the squeeze for 3 seconds, then relax for 5 to 10 seconds.  Start with 3 seconds, then add 1 second each week until you are able to squeeze for 10 seconds.  Repeat the exercise 10 times a session. Do 3 to 8 sessions a day.  When should you call for help?  Watch closely for changes in your health, and be sure to contact your doctor if:    Your incontinence is getting worse.     You do not get better as expected.   Where can you learn more?  Go to https://www.[employer].net/patiented  Enter V684 in the search box to learn more about \"Bladder Training: Care Instructions.\"  Current as of: April 30, 2024  Content Version: 14.3    2024 [employer].   Care instructions adapted under license by your healthcare professional. If you have questions about a medical condition or this instruction, always ask your healthcare professional. [employer] disclaims any warranty or liability for your use of this information.       "

## 2025-06-20 ENCOUNTER — TRANSFERRED RECORDS (OUTPATIENT)
Dept: HEALTH INFORMATION MANAGEMENT | Facility: CLINIC | Age: 80
End: 2025-06-20
Payer: COMMERCIAL

## (undated) DEVICE — DRSG AQUACEL AG HYDROFIBER  3.5X10" 422605

## (undated) DEVICE — GLOVE BIOGEL PI INDICATOR 8.0 LF 41680

## (undated) DEVICE — BLADE SAW SAGITTAL STRK DUAL CUT 4118-135-090

## (undated) DEVICE — GLOVE BIOGEL INDICATOR 7.5 LF 41675

## (undated) DEVICE — SUCTION MANIFOLD NEPTUNE 2 SYS 4 PORT 0702-020-000

## (undated) DEVICE — ELECTRODE PATIENT RETURN ADULT L10 FT 2 PLATE CORD 0855C

## (undated) DEVICE — SUTURE VICRYL+ 2-0 27IN CT-1 UND VCP259H

## (undated) DEVICE — SOL NACL 0.9% IRRIG 1000ML BOTTLE 2F7124

## (undated) DEVICE — HOOD SURG T7 PLUS STRL LF DISP 0416-801-200

## (undated) DEVICE — SOLUTION IRRIG 2B7127 .9NS 3000ML BAG

## (undated) DEVICE — SU ETHIBOND 1 CT-1 30" X425H

## (undated) DEVICE — CUSTOM PACK TOTAL KNEE SOP5BTKHEC

## (undated) DEVICE — DECANTER VIAL 2006S

## (undated) DEVICE — GLOVE BIOGEL PI SZ 7.0 40870

## (undated) DEVICE — STPL SKIN PROXIMATE 35 WIDE PMW35

## (undated) DEVICE — HOOD SURG T7PLUS PEEL AWAY FACE SHIELD STRL LF 0416-801-100

## (undated) DEVICE — SUTURE VICRYL+ 1 18 CT/CR  VLT VCP753D

## (undated) DEVICE — BLADE SAW SAGITTAL STRK WIDE 25.4X85X1.2MM 2108-151-000

## (undated) DEVICE — CUSTOM PACK TOTAL KNEE ACCESSORY SOP5BTAHEA

## (undated) DEVICE — GLOVE BIOGEL PI SZ 8.0 40880

## (undated) RX ORDER — PROPOFOL 10 MG/ML
INJECTION, EMULSION INTRAVENOUS
Status: DISPENSED
Start: 2024-09-12

## (undated) RX ORDER — PHENYLEPHRINE HYDROCHLORIDE 10 MG/ML
INJECTION INTRAVENOUS
Status: DISPENSED
Start: 2024-09-12

## (undated) RX ORDER — DEXAMETHASONE SODIUM PHOSPHATE 10 MG/ML
INJECTION, EMULSION INTRAMUSCULAR; INTRAVENOUS
Status: DISPENSED
Start: 2024-09-12

## (undated) RX ORDER — ONDANSETRON 2 MG/ML
INJECTION INTRAMUSCULAR; INTRAVENOUS
Status: DISPENSED
Start: 2024-09-12

## (undated) RX ORDER — GLYCOPYRROLATE 0.2 MG/ML
INJECTION, SOLUTION INTRAMUSCULAR; INTRAVENOUS
Status: DISPENSED
Start: 2024-09-12